# Patient Record
Sex: MALE | Race: BLACK OR AFRICAN AMERICAN | Employment: UNEMPLOYED | URBAN - METROPOLITAN AREA
[De-identification: names, ages, dates, MRNs, and addresses within clinical notes are randomized per-mention and may not be internally consistent; named-entity substitution may affect disease eponyms.]

---

## 2017-04-19 LAB — LDL-C, EXTERNAL: 56

## 2017-04-20 ENCOUNTER — OFFICE VISIT (OUTPATIENT)
Dept: CARDIOLOGY CLINIC | Age: 77
End: 2017-04-20

## 2017-04-20 VITALS
SYSTOLIC BLOOD PRESSURE: 127 MMHG | WEIGHT: 233 LBS | DIASTOLIC BLOOD PRESSURE: 66 MMHG | HEIGHT: 71 IN | HEART RATE: 75 BPM | BODY MASS INDEX: 32.62 KG/M2

## 2017-04-20 DIAGNOSIS — I34.0 NON-RHEUMATIC MITRAL REGURGITATION: ICD-10-CM

## 2017-04-20 DIAGNOSIS — I10 ESSENTIAL HYPERTENSION, BENIGN: ICD-10-CM

## 2017-04-20 DIAGNOSIS — R55 SYNCOPE AND COLLAPSE: ICD-10-CM

## 2017-04-20 DIAGNOSIS — I50.32 CHRONIC DIASTOLIC HEART FAILURE (HCC): Primary | ICD-10-CM

## 2017-04-20 DIAGNOSIS — E78.00 PURE HYPERCHOLESTEROLEMIA: ICD-10-CM

## 2017-04-20 DIAGNOSIS — E66.9 OBESITY (BMI 30.0-34.9): ICD-10-CM

## 2017-04-20 NOTE — LETTER
Gómez Jha 1940 
 
4/20/2017 Dear MD Han Herrera MD 
 
I had the pleasure of evaluating  Mr. Kay Palafox in office today. Below are the relevant portions of my assessment and plan of care. ICD-10-CM ICD-9-CM 1. Chronic diastolic heart failure ([facility] [address] I50.32 428.32   
 4/17; 10/16 NYHA2; use Teds rather than more diuresis due to h/o syncope 2. Essential hypertension, benign I10 401.1 AMB POC EKG ROUTINE W/ 12 LEADS, INTER & REP Controlled; stable 3. Non-rheumatic mitral regurgitation I34.0 424.0   
 1/14 mild MR  
4. Pure hypercholesterolemia E78.00 272.0   
 5/15 LDL97 
get from PCP 5. Syncope and collapse R55 780.2   
 1/14 likely vagal, post 1st dose hytrin 5/15; 11/15; 10/16; 4/17 no recurrence 6. Obesity (BMI 30.0-34. 9) E66.9 278.00 Weight loss has been strongly encouraged by following dietary restrictions and an exercise routine. Current Outpatient Prescriptions Medication Sig Dispense Refill  finasteride (PROSCAR) 5 mg tablet take 1 tablet by mouth once daily 90 Tab 3  
 RAPAFLO 8 mg capsule take 1 capsule by mouth once daily with BREAKFAST 30 Cap 12  
 metoprolol tartrate (LOPRESSOR) 50 mg tablet take 1 tablet by mouth twice a day 60 Tab 6  
 atorvastatin (LIPITOR) 10 mg tablet Take  by mouth daily.  acetaminophen (TYLENOL) 500 mg tablet Take  by mouth daily.  valsartan-hydrochlorothiazide (DIOVAN-HCT) 320-25 mg per tablet   1  
 aspirin delayed-release 81 mg tablet Take  by mouth daily.  metFORMIN (GLUCOPHAGE) 500 mg tablet Take  by mouth daily (with breakfast).  ergocalciferol (VITAMIN D2) 50,000 unit capsule Take 50,000 Units by mouth daily.  amLODIPine (NORVASC) 5 mg tablet take 1 tablet by mouth once daily 30 Tab 3  
 miscellaneous medical supply (T.E.D. ANTI-EMBOLISM STOCKING) misc 1 Each by Does Not Apply route daily.  2 Each 0  
  montelukast (SINGULAIR) 10 mg tablet Take 10 mg by mouth daily. Orders Placed This Encounter  AMB POC EKG ROUTINE W/ 12 LEADS, INTER & REP Order Specific Question:   Reason for Exam: Answer:   hypertension If you have questions, please do not hesitate to call me. I look forward to following Mr. Minerva Pimentel along with you. Sincerely, Radhika Herman MD

## 2017-04-20 NOTE — PROGRESS NOTES
1. Have you been to the ER, urgent care clinic since your last visit? Hospitalized since your last visit? No    2. Have you seen or consulted any other health care providers outside of the [address] [place] [address] since your last visit? Include any pap smears or colon screening. Yes Where: Dr Hoda Cano     3. Since your last visit, have you had any of the following symptoms? .           4. Have you had any blood work, X-rays or cardiac testing? Yes Where: PCP Office             5.  Where do you normally have your labs drawn? PCP Office    6. Do you need any refills today?    No

## 2017-04-20 NOTE — PATIENT INSTRUCTIONS
There are no discontinued medications. Orders Placed This Encounter    AMB POC EKG ROUTINE W/ 12 LEADS, INTER & REP     Order Specific Question:   Reason for Exam:     Answer:   hypertension          Mediterranean Diet: Care Instructions  Your Care Instructions  The Mediterranean diet features foods eaten in [place], [place], [place] and [place], and other countries that border the [facility]. It emphasizes eating a diet rich in fruits, vegetables, nuts, and high-fiber grains, and limits meat, cheese, and sweets. The Mediterranean diet may:  · Prevent heart disease and lower the risk of a heart attack or stroke. · Prevent type 2 diabetes. · Prevent Alzheimer's disease and other dementia. · Prevent depression. · Prevent Parkinson's disease. This diet contains more fat than other heart-healthy diets. But the fats are mainly from nuts, unsaturated oils, such as fish oils, olive oil, and certain nut or seed oils (such as canola, soybean, or flaxseed oil). These types of oils may help protect the heart and blood vessels. Follow-up care is a key part of your treatment and safety. Be sure to make and go to all appointments, and call your doctor if you are having problems. It's also a good idea to know your test results and keep a list of the medicines you take. How can you care for yourself at home? What to eat  · Eat a variety of fruits and vegetables each day, such as grapes, blueberries, tomatoes, broccoli, peppers, figs, olives, spinach, eggplant, beans, lentils, and chickpeas. · Eat a variety of whole-grain foods each day, such as oats, brown rice, and whole wheat bread, pasta, and couscous. · Eat fish at least 2 times a week. Try tuna, salmon, mackerel, lake trout, herring, or sardines. · Eat moderate amounts of low-fat dairy products, such as milk, cheese, or yogurt. · Eat moderate amounts of poultry and eggs.   · Choose healthy (unsaturated) fats, such as nuts, olive oil and certain nut or seed oils like canola, soybean, and flaxseed. · Limit unhealthy (saturated) fats, such as butter, palm oil, and coconut oil. And limit fats found in animal products, such as meat and dairy products made with whole milk. Try to eat red meat only a few times a month in very small amounts. · Limit sweets and desserts to only a few times a week. This includes sugar-sweetened drinks like soda. The Mediterranean diet may also include red wine with your meal1 glass each day for women and up to 2 glasses a day for men. Tips for changing your diet  · Dip bread in a mix of olive oil and fresh herbs instead of using butter. · Add avocado slices to your sandwich instead of carranza. · Have fish for lunch or dinner instead of red meat. Brush the fish with olive oil, and broil or grill it. · Sprinkle your salad with seeds or nuts instead of cheese. · Cook with olive or canola oil instead of butter or oils that are high in saturated fat. · Switch from 2% milk or whole milk to 1% or fat-free milk. · Dip raw vegetables in a vinaigrette dressing or hummus instead of dips made from mayonnaise or sour cream.  · Have a piece of fruit for dessert instead of a piece of cake. Try baked apples, or have some dried fruit. Part of the Mediterranean diet is being active. Get at least 30 minutes of exercise on most days of the week. Walking is a good choice. You also may want to do other activities, such as running, swimming, cycling, or playing tennis or team sports. Where can you learn more? Go to http://wade-indiana.info/. Enter O407 in the search box to learn more about \"Mediterranean Diet: Care Instructions. \"  Current as of: October 21, 2016  Content Version: 11.2  © 2216-0551 [employer]. Care instructions adapted under license by [employer] (which disclaims liability or warranty for this information).  If you have questions about a medical condition or this instruction, always ask your healthcare professional. [place] [address] any warranty or liability for your use of this information.

## 2017-04-20 NOTE — PROGRESS NOTES
HISTORY OF PRESENT ILLNESS  Abbi Mcmillan is a 67 y.o. male. HPI Comments: Syncope 3-4 hrs post 1st dose of hytrin in 1/14, gradual fall with mild sweating, urinary incontinence, no vomiting  Similar episode in 2005 approx  No recurrence since not taking hytrin in 1/14 5/16 restarted diovan and d/og lisinopril    Hypertension   The history is provided by the patient and medical records. Associated symptoms include shortness of breath. Pertinent negatives include no chest pain and no headaches. Cholesterol Problem   The history is provided by the medical records. This is a chronic problem. Associated symptoms include shortness of breath. Pertinent negatives include no chest pain and no headaches. CHF   The history is provided by the medical records. This is a chronic problem. Associated symptoms include shortness of breath. Pertinent negatives include no chest pain and no headaches. Valvular Heart Disease   The history is provided by the medical records (mr). Associated symptoms include shortness of breath. Pertinent negatives include no chest pain and no headaches. Shortness of Breath   The history is provided by the patient. This is a new problem. The problem occurs intermittently. The current episode started more than 1 week ago (few yrs, mainly fatigue). The problem has not changed since onset. Associated symptoms include leg swelling. Pertinent negatives include no fever, no headaches, no cough, no wheezing, no PND, no orthopnea, no chest pain, no vomiting, no rash and no claudication. The problem's precipitants include exercise (weed eating, rushing to walk; 11/15 cutting grass). Leg Swelling   The history is provided by the patient. This is a chronic problem. The current episode started more than 1 week ago. The problem occurs daily. The problem has not changed since onset. Associated symptoms include shortness of breath. Pertinent negatives include no chest pain and no headaches.  The symptoms are aggravated by standing. The symptoms are relieved by sleep. Review of Systems   Constitutional: Negative for chills, fever, malaise/fatigue and weight loss. HENT: Negative for nosebleeds. Eyes: Negative for discharge. Respiratory: Positive for shortness of breath. Negative for cough and wheezing. Cardiovascular: Positive for leg swelling. Negative for chest pain, palpitations, orthopnea, claudication and PND. Gastrointestinal: Negative for diarrhea, nausea and vomiting. Genitourinary: Negative for dysuria and hematuria. Musculoskeletal: Negative for joint pain. Skin: Negative for rash. Neurological: Positive for dizziness. Negative for seizures, loss of consciousness and headaches. Endo/Heme/Allergies: Negative for polydipsia. Does not bruise/bleed easily. Psychiatric/Behavioral: Negative for depression and substance abuse. The patient does not have insomnia.       No Known Allergies    Past Medical History:   Diagnosis Date    Allergic sinusitis     BPH (benign prostatic hyperplasia)     Chronic diastolic heart failure (HCC) 2/11/2014    Congestive heart failure, unspecified     DJD (degenerative joint disease)     Erectile dysfunction     Essential hypertension     Hydrocele     Hyperlipidemia 5/2/2016    Mitral valve disorders 2/11/2014 1/14 mild MR     Obesity, unspecified 11/10/2014    Syncope and collapse 1/14/2014 1/14 likely vagal, post 1st dose hytrin 5/15 no recurrence        Family History   Problem Relation Age of Onset    Heart Attack Father 61    Heart Attack Brother 58    Prostate Cancer Other     Stroke Other     Hypertension Other        Social History   Substance Use Topics    Smoking status: Former Smoker     Quit date: 1/14/1974    Smokeless tobacco: Never Used    Alcohol use No        Current Outpatient Prescriptions   Medication Sig    finasteride (PROSCAR) 5 mg tablet take 1 tablet by mouth once daily    RAPAFLO 8 mg capsule take 1 capsule by mouth once daily with BREAKFAST    metoprolol tartrate (LOPRESSOR) 50 mg tablet take 1 tablet by mouth twice a day    atorvastatin (LIPITOR) 10 mg tablet Take  by mouth daily.  acetaminophen (TYLENOL) 500 mg tablet Take  by mouth daily.  valsartan-hydrochlorothiazide (DIOVAN-HCT) 320-25 mg per tablet     aspirin delayed-release 81 mg tablet Take  by mouth daily.  metFORMIN (GLUCOPHAGE) 500 mg tablet Take  by mouth daily (with breakfast).  ergocalciferol (VITAMIN D2) 50,000 unit capsule Take 50,000 Units by mouth daily.  amLODIPine (NORVASC) 5 mg tablet take 1 tablet by mouth once daily    miscellaneous medical supply (T.E.D. ANTI-EMBOLISM STOCKING) misc 1 Each by Does Not Apply route daily.  montelukast (SINGULAIR) 10 mg tablet Take 10 mg by mouth daily. No current facility-administered medications for this visit. Past Surgical History:   Procedure Laterality Date    HX CIRCUMCISION      HX HERNIA REPAIR Bilateral        Diagnostic Studies:  CARDIOLOGY STUDIES 1/16/2014   Exercise Nuclear Stress Test Result red capacity, nl scan, nl EF   Echocardiogram - Complete Result 55%EF, mild DD, mild MR/dil LA       Visit Vitals    /66    Pulse 75    Ht 5' 11\" (1.803 m)    Wt 105.7 kg (233 lb)    BMI 32.5 kg/m2       Mr. Reynaldo Sutton has a reminder for a \"due or due soon\" health maintenance. I have asked that he contact his primary care provider for follow-up on this health maintenance. Physical Exam   Constitutional: He is oriented to person, place, and time. He appears well-developed and well-nourished. No distress. HENT:   Head: Normocephalic and atraumatic. Mouth/Throat: Normal dentition. Eyes: Right eye exhibits no discharge. Left eye exhibits no discharge. No scleral icterus. Neck: Neck supple. No JVD present. Carotid bruit is not present. No thyromegaly present.    Cardiovascular: Normal rate, regular rhythm, S1 normal, S2 normal, normal heart sounds and intact distal pulses. Exam reveals no gallop and no friction rub. No murmur heard. Pulmonary/Chest: Effort normal and breath sounds normal. He has no wheezes. He has no rales. Abdominal: Soft. He exhibits no mass. There is no tenderness. Musculoskeletal: He exhibits edema (trace). Lymphadenopathy:        Right cervical: No superficial cervical adenopathy present. Left cervical: No superficial cervical adenopathy present. Neurological: He is alert and oriented to person, place, and time. Skin: Skin is warm and dry. No rash noted. Psychiatric: He has a normal mood and affect. His behavior is normal.       ASSESSMENT and PLAN    Discussed the patient's above normal BMI with him. I have recommended the following interventions: dietary management education, guidance, and counseling . The BMI follow up plan is as follows: BMI is out of normal parameters and plan is as follows: I have counseled this patient on diet and exercise regimens          Heaven Rothman was seen today for hypertension, cholesterol problem and chf.    Diagnoses and all orders for this visit:    Chronic diastolic heart failure ([address]  Comments:  4/17; 10/16 [id number]; use Teds rather than more diuresis due to h/o syncope      Essential hypertension, benign  Comments:  Controlled; stable      Orders:  -     AMB POC EKG ROUTINE W/ 12 LEADS, INTER & REP    Non-rheumatic mitral regurgitation  Comments:  1/14 mild MR    Pure hypercholesterolemia  Comments:  5/15 LDL97  get from PCP    Syncope and collapse  Comments:  1/14 likely vagal, post 1st dose hytrin  5/15; 11/15; 10/16; 4/17 no recurrence      Obesity (BMI 30.0-34. 9)  Comments:  Weight loss has been strongly encouraged by following dietary restrictions and an exercise routine. Pertinent laboratory and test data reviewed and discussed with patient. See patient instructions also for other medical advice given    There are no discontinued medications.     Follow-up Disposition:  Return in about 6 months (around 10/20/2017), or if symptoms worsen or fail to improve, for with ekg.

## 2017-04-20 NOTE — MR AVS SNAPSHOT
Visit Information Date & Time Provider Department Dept. Phone Encounter #  
 4/20/2017  8:15 AM Kaye Juan MD [facility] [id number] 2016 Follow-up Instructions Return in about 6 months (around 10/20/2017), or if symptoms worsen or fail to improve. Your Appointments 4/27/2017  9:30 AM  
ESTABLISHED PATIENT with Concepción Khan MD  
[facility]) Appt Note: 6mo F/u Flow [place] UA; .  
 [address] Upcoming Health Maintenance Date Due DTaP/Tdap/Td series (1 - Tdap) 8/15/1960 ZOSTER VACCINE AGE 60> 8/15/1999 GLAUCOMA SCREENING Q2Y 8/15/2004 Pneumococcal 65+ Low/Medium Risk (1 of 2 - PCV13) 8/15/2004 MEDICARE YEARLY EXAM 8/15/2004 INFLUENZA AGE 9 TO ADULT 8/1/2016 Allergies as of 4/20/2017  Review Complete On: 4/20/2017 By: Kaye Juan MD  
 No Known Allergies Current Immunizations  Never Reviewed No immunizations on file. Not reviewed this visit You Were Diagnosed With   
  
 Codes Comments Chronic diastolic heart failure ([facility] [address]    -  Primary ICD-10-CM: I50.32 
ICD-9-CM: 428.32 4/17; 10/16 QSDC7; use Teds rather than more diuresis due to h/o syncope Essential hypertension, benign     ICD-10-CM: I10 
ICD-9-CM: 401.1 Controlled; stable Non-rheumatic mitral regurgitation     ICD-10-CM: I34.0 ICD-9-CM: 424.0 1/14 mild MR Pure hypercholesterolemia     ICD-10-CM: E78.00 ICD-9-CM: 272.0 5/15 LDL97 
get from PCP Syncope and collapse     ICD-10-CM: R55 
ICD-9-CM: 780.2 1/14 likely vagal, post 1st dose hytrin 5/15; 11/15; 10/16; 4/17 no recurrence Obesity (BMI 30.0-34.9)     ICD-10-CM: Z35.2 ICD-9-CM: 278.00 Weight loss has been strongly encouraged by following dietary restrictions and an exercise routine. Vitals BP Pulse Height(growth percentile) Weight(growth percentile) BMI Smoking Status 127/66 75 5' 11\" (1.803 m) 233 lb (105.7 kg) 32.5 kg/m2 Former Smoker Vitals History BMI and BSA Data Body Mass Index Body Surface Area 32.5 kg/m 2 2.3 m 2 Preferred Pharmacy Pharmacy Name Phone [facility], [address] Your Updated Medication List  
  
   
This list is accurate as of: 4/20/17  9:09 AM.  Always use your most recent med list.  
  
  
  
  
 acetaminophen 500 mg tablet Commonly known as:  TYLENOL Take  by mouth daily. amLODIPine 5 mg tablet Commonly known as:  NORVASC  
take 1 tablet by mouth once daily  
  
 aspirin delayed-release 81 mg tablet Take  by mouth daily. atorvastatin 10 mg tablet Commonly known as:  LIPITOR Take  by mouth daily. finasteride 5 mg tablet Commonly known as:  PROSCAR  
take 1 tablet by mouth once daily  
  
 metFORMIN 500 mg tablet Commonly known as:  GLUCOPHAGE Take  by mouth daily (with breakfast). metoprolol tartrate 50 mg tablet Commonly known as:  LOPRESSOR  
take 1 tablet by mouth twice a day  
  
 miscellaneous medical supply Misc Commonly known as:  T.E.D. ANTI-EMBOLISM STOCKING  
1 Each by Does Not Apply route daily. montelukast 10 mg tablet Commonly known as:  SINGULAIR Take 10 mg by mouth daily. RAPAFLO 8 mg capsule Generic drug:  silodosin  
take 1 capsule by mouth once daily with BREAKFAST  
  
 valsartan-hydroCHLOROthiazide 320-25 mg per tablet Commonly known as:  DIOVAN-HCT  
  
 VITAMIN D2 50,000 unit capsule Generic drug:  ergocalciferol Take 50,000 Units by mouth daily. We Performed the Following AMB POC EKG ROUTINE W/ 12 LEADS, INTER & REP [71624 CPT(R)] Follow-up Instructions Return in about 6 months (around 10/20/2017), or if symptoms worsen or fail to improve. Patient Instructions There are no discontinued medications. Orders Placed This Encounter  AMB POC EKG ROUTINE W/ 12 LEADS, INTER & REP Order Specific Question:   Reason for Exam: Answer:   hypertension Mediterranean Diet: Care Instructions Your Care Instructions The Mediterranean diet features foods eaten in [place], [place], [place] and [place], and other countries that border the [facility]. It emphasizes eating a diet rich in fruits, vegetables, nuts, and high-fiber grains, and limits meat, cheese, and sweets. The Mediterranean diet may: · Prevent heart disease and lower the risk of a heart attack or stroke. · Prevent type 2 diabetes. · Prevent Alzheimer's disease and other dementia. · Prevent depression. · Prevent Parkinson's disease. This diet contains more fat than other heart-healthy diets. But the fats are mainly from nuts, unsaturated oils, such as fish oils, olive oil, and certain nut or seed oils (such as canola, soybean, or flaxseed oil). These types of oils may help protect the heart and blood vessels. Follow-up care is a key part of your treatment and safety. Be sure to make and go to all appointments, and call your doctor if you are having problems. It's also a good idea to know your test results and keep a list of the medicines you take. How can you care for yourself at home? What to eat · Eat a variety of fruits and vegetables each day, such as grapes, blueberries, tomatoes, broccoli, peppers, figs, olives, spinach, eggplant, beans, lentils, and chickpeas. · Eat a variety of whole-grain foods each day, such as oats, brown rice, and whole wheat bread, pasta, and couscous. · Eat fish at least 2 times a week. Try tuna, salmon, mackerel, lake trout, herring, or sardines. · Eat moderate amounts of low-fat dairy products, such as milk, cheese, or yogurt. · Eat moderate amounts of poultry and eggs. · Choose healthy (unsaturated) fats, such as nuts, olive oil and certain nut or seed oils like canola, soybean, and flaxseed. · Limit unhealthy (saturated) fats, such as butter, palm oil, and coconut oil. And limit fats found in animal products, such as meat and dairy products made with whole milk. Try to eat red meat only a few times a month in very small amounts. · Limit sweets and desserts to only a few times a week. This includes sugar-sweetened drinks like soda. The Mediterranean diet may also include red wine with your meal1 glass each day for women and up to 2 glasses a day for men. Tips for changing your diet · Dip bread in a mix of olive oil and fresh herbs instead of using butter. · Add avocado slices to your sandwich instead of carranza. · Have fish for lunch or dinner instead of red meat. Brush the fish with olive oil, and broil or grill it. · Sprinkle your salad with seeds or nuts instead of cheese. · Cook with olive or canola oil instead of butter or oils that are high in saturated fat. · Switch from 2% milk or whole milk to 1% or fat-free milk. · Dip raw vegetables in a vinaigrette dressing or hummus instead of dips made from mayonnaise or sour cream. 
· Have a piece of fruit for dessert instead of a piece of cake. Try baked apples, or have some dried fruit. Part of the Mediterranean diet is being active. Get at least 30 minutes of exercise on most days of the week. Walking is a good choice. You also may want to do other activities, such as running, swimming, cycling, or playing tennis or team sports. Where can you learn more? Go to http://wade-indiana.info/. Enter O407 in the search box to learn more about \"Mediterranean Diet: Care Instructions. \" Current as of: October 21, 2016 Content Version: 11.2 © 7203-1260 [employer].  Care instructions adapted under license by wali (which disclaims liability or warranty for this information). If you have questions about a medical condition or this instruction, always ask your healthcare professional. [address] any warranty or liability for your use of this information. Introducing [facility]! Jeramie Franklin introduces Intucell patient portal. Now you can access parts of your medical record, email your doctor's office, and request medication refills online. 1. In your internet browser, go to https://[employer]. OX [employer]IA/[employer] 2. Click on the First Time User? Click Here link in the Sign In box. You will see the New Member Sign Up page. 3. Enter your Intucell Access Code exactly as it appears below. You will not need to use this code after youve completed the sign-up process. If you do not sign up before the expiration date, you must request a new code. · Intucell Access Code: [email or web address] Expires: 7/19/2017  8:25 AM 
 
4. Enter the last four digits of your Social Security Number (xxxx) and Date of Birth (mm/dd/yyyy) as indicated and click Submit. You will be taken to the next sign-up page. 5. Create a Intucell ID. This will be your Intucell login ID and cannot be changed, so think of one that is secure and easy to remember. 6. Create a Intucell password. You can change your password at any time. 7. Enter your Password Reset Question and Answer. This can be used at a later time if you forget your password. 8. Enter your e-mail address. You will receive e-mail notification when new information is available in [address] 9. Click Sign Up. You can now view and download portions of your medical record. 10. Click the Download Summary menu link to download a portable copy of your medical information. If you have questions, please visit the Frequently Asked Questions section of the Intucell website. Remember, Intucell is NOT to be used for urgent needs. For medical emergencies, dial 911. Now available from your iPhone and Android! Please provide this summary of care documentation to your next provider. Your primary care clinician is listed as JANINE HARO. If you have any questions after today's visit, please call [phone].

## 2017-10-02 ENCOUNTER — OFFICE VISIT (OUTPATIENT)
Dept: CARDIOLOGY CLINIC | Age: 77
End: 2017-10-02

## 2017-10-02 VITALS
DIASTOLIC BLOOD PRESSURE: 74 MMHG | SYSTOLIC BLOOD PRESSURE: 129 MMHG | WEIGHT: 232 LBS | HEART RATE: 56 BPM | HEIGHT: 71 IN | BODY MASS INDEX: 32.48 KG/M2

## 2017-10-02 DIAGNOSIS — I34.0 NON-RHEUMATIC MITRAL REGURGITATION: ICD-10-CM

## 2017-10-02 DIAGNOSIS — I50.32 CHRONIC DIASTOLIC HEART FAILURE (HCC): Primary | ICD-10-CM

## 2017-10-02 DIAGNOSIS — E78.00 PURE HYPERCHOLESTEROLEMIA: ICD-10-CM

## 2017-10-02 DIAGNOSIS — I10 ESSENTIAL HYPERTENSION, BENIGN: ICD-10-CM

## 2017-10-02 DIAGNOSIS — E66.9 OBESITY (BMI 30.0-34.9): ICD-10-CM

## 2017-10-02 NOTE — LETTER
Hernán Solders 1940 
 
10/2/2017 Dear MD Vega Mello MD 
 
I had the pleasure of evaluating  Mr. Jey Flores in office today. Below are the relevant portions of my assessment and plan of care. ICD-10-CM ICD-9-CM 1. Chronic diastolic heart failure ([address] I50.32 428.32   
 9/17; 4/17; 10/16 NYHA2; use Teds rather than more diuresis due to h/o syncope 2. Non-rheumatic mitral regurgitation I34.0 424.0   
 1/14 mild MR 
  
3. Pure hypercholesterolemia E78.00 272.0   
 5/15 LDL97 
get labs from PCP 4. Essential hypertension, benign I10 401.1 Controlled; stable 5. Obesity (BMI 30.0-34. 9) E66.9 278.00 Weight loss has been strongly encouraged by following dietary restrictions and an exercise routine. Current Outpatient Prescriptions Medication Sig Dispense Refill  finasteride (PROSCAR) 5 mg tablet take 1 tablet by mouth once daily 90 Tab 3  
 silodosin (RAPAFLO) 8 mg capsule take 1 capsule by mouth once daily with BREAKFAST 90 Cap 3  
 metoprolol tartrate (LOPRESSOR) 50 mg tablet take 1 tablet by mouth twice a day 60 Tab 6  
 atorvastatin (LIPITOR) 10 mg tablet Take  by mouth daily.  acetaminophen (TYLENOL) 500 mg tablet Take  by mouth daily.  valsartan-hydrochlorothiazide (DIOVAN-HCT) 320-25 mg per tablet   1  
 aspirin delayed-release 81 mg tablet Take  by mouth daily.  metFORMIN (GLUCOPHAGE) 500 mg tablet Take  by mouth daily (with breakfast).  ergocalciferol (VITAMIN D2) 50,000 unit capsule Take 50,000 Units by mouth daily.  amLODIPine (NORVASC) 5 mg tablet take 1 tablet by mouth once daily 30 Tab 3  
 miscellaneous medical supply (T.E.D. ANTI-EMBOLISM STOCKING) misc 1 Each by Does Not Apply route daily. 2 Each 0  
 montelukast (SINGULAIR) 10 mg tablet Take 10 mg by mouth daily. No orders of the defined types were placed in this encounter. If you have questions, please do not hesitate to call me. I look forward to following Mr. Peterson Zabala along with you. Sincerely, Alfreda Barlow MD

## 2017-10-02 NOTE — MR AVS SNAPSHOT
Visit Information Date & Time Provider Department Dept. Phone Encounter #  
 10/2/2017  9:30 AM Jesica Way MD [facility]  Follow-up Instructions Return in about 6 months (around 4/2/2018), or if symptoms worsen or fail to improve. Your Appointments 10/31/2017  8:45 AM  
ESTABLISHED PATIENT with Gael Mac MD  
[facility]) Appt Note: 6mo F/u Flow [place] ua  
 [address] [facility] [address] Upcoming Health Maintenance Date Due DTaP/Tdap/Td series (1 - Tdap) 8/15/1960 ZOSTER VACCINE AGE 60> 6/15/1999 GLAUCOMA SCREENING Q2Y 8/15/2004 Pneumococcal 65+ Low/Medium Risk (1 of 2 - PCV13) 8/15/2004 MEDICARE YEARLY EXAM 8/15/2004 INFLUENZA AGE 9 TO ADULT 8/1/2017 Allergies as of 10/2/2017  Review Complete On: 10/2/2017 By: Jordan Rodriguez LPN No Known Allergies Current Immunizations  Never Reviewed No immunizations on file. Not reviewed this visit You Were Diagnosed With   
  
 Codes Comments Chronic diastolic heart failure ([facility] [address]    -  Primary ICD-10-CM: I50.32 
ICD-9-CM: 428.32 9/17; 4/17; 10/16 RSLR5; use Teds rather than more diuresis due to h/o syncope Non-rheumatic mitral regurgitation     ICD-10-CM: I34.0 ICD-9-CM: 424.0 1/14 mild MR Pure hypercholesterolemia     ICD-10-CM: E78.00 ICD-9-CM: 272.0 5/15 LDL97 
get labs from PCP Essential hypertension, benign     ICD-10-CM: I10 
ICD-9-CM: 401.1 Controlled; stable Obesity (BMI 30.0-34.9)     ICD-10-CM: V87.3 ICD-9-CM: 278.00 Weight loss has been strongly encouraged by following dietary restrictions and an exercise routine. Vitals BP Pulse Height(growth percentile) Weight(growth percentile) BMI Smoking Status 129/74 (!) 56 5' 11\" (1.803 m) 232 lb (105.2 kg) 32.36 kg/m2 Former Smoker Vitals History BMI and BSA Data Body Mass Index Body Surface Area  
 32.36 kg/m 2 2.3 m 2 Preferred Pharmacy Pharmacy Name Phone [address] [facility], [address] Your Updated Medication List  
  
   
This list is accurate as of: 10/2/17 10:17 AM.  Always use your most recent med list.  
  
  
  
  
 acetaminophen 500 mg tablet Commonly known as:  TYLENOL Take  by mouth daily. amLODIPine 5 mg tablet Commonly known as:  NORVASC  
take 1 tablet by mouth once daily  
  
 aspirin delayed-release 81 mg tablet Take  by mouth daily. atorvastatin 10 mg tablet Commonly known as:  LIPITOR Take  by mouth daily. finasteride 5 mg tablet Commonly known as:  PROSCAR  
take 1 tablet by mouth once daily  
  
 metFORMIN 500 mg tablet Commonly known as:  GLUCOPHAGE Take  by mouth daily (with breakfast). metoprolol tartrate 50 mg tablet Commonly known as:  LOPRESSOR  
take 1 tablet by mouth twice a day  
  
 miscellaneous medical supply Misc Commonly known as:  T.E.D. ANTI-EMBOLISM STOCKING  
1 Each by Does Not Apply route daily. montelukast 10 mg tablet Commonly known as:  SINGULAIR Take 10 mg by mouth daily. silodosin 8 mg capsule Commonly known as:  RAPAFLO  
take 1 capsule by mouth once daily with BREAKFAST  
  
 valsartan-hydroCHLOROthiazide 320-25 mg per tablet Commonly known as:  DIOVAN-HCT  
  
 VITAMIN D2 50,000 unit capsule Generic drug:  ergocalciferol Take 50,000 Units by mouth daily. Follow-up Instructions Return in about 6 months (around 4/2/2018), or if symptoms worsen or fail to improve. Patient Instructions There are no discontinued medications. No orders of the defined types were placed in this encounter. Body Mass Index: Care Instructions Your Care Instructions Body mass index (BMI) can help you see if your weight is raising your risk for health problems. It uses a formula to compare how much you weigh with how tall you are. · A BMI lower than 18.5 is considered underweight. · A BMI between 18.5 and 24.9 is considered healthy. · A BMI between 25 and 29.9 is considered overweight. A BMI of 30 or higher is considered obese. If your BMI is in the normal range, it means that you have a lower risk for weight-related health problems. If your BMI is in the overweight or obese range, you may be at increased risk for weight-related health problems, such as high blood pressure, heart disease, stroke, arthritis or joint pain, and diabetes. If your BMI is in the underweight range, you may be at increased risk for health problems such as fatigue, lower protection (immunity) against illness, muscle loss, bone loss, hair loss, and hormone problems. BMI is just one measure of your risk for weight-related health problems. You may be at higher risk for health problems if you are not active, you eat an unhealthy diet, or you drink too much alcohol or use tobacco products. Follow-up care is a key part of your treatment and safety. Be sure to make and go to all appointments, and call your doctor if you are having problems. It's also a good idea to know your test results and keep a list of the medicines you take. How can you care for yourself at home? · Practice healthy eating habits. This includes eating plenty of fruits, vegetables, whole grains, lean protein, and low-fat dairy. · If your doctor recommends it, get more exercise. Walking is a good choice. Bit by bit, increase the amount you walk every day. Try for at least 30 minutes on most days of the week. · Do not smoke. Smoking can increase your risk for health problems. If you need help quitting, talk to your doctor about stop-smoking programs and medicines. These can increase your chances of quitting for good. · Limit alcohol to 2 drinks a day for men and 1 drink a day for women. Too much alcohol can cause health problems. If you have a BMI higher than 25 · Your doctor may do other tests to check your risk for weight-related health problems. This may include measuring the distance around your waist. A waist measurement of more than 40 inches in men or 35 inches in women can increase the risk of weight-related health problems. · Talk with your doctor about steps you can take to stay healthy or improve your health. You may need to make lifestyle changes to lose weight and stay healthy, such as changing your diet and getting regular exercise. If you have a BMI lower than 18.5 · Your doctor may do other tests to check your risk for health problems. · Talk with your doctor about steps you can take to stay healthy or improve your health. You may need to make lifestyle changes to gain or maintain weight and stay healthy, such as getting more healthy foods in your diet and doing exercises to build muscle. Where can you learn more? Go to http://wade-indiana.info/. Enter S176 in the search box to learn more about \"Body Mass Index: Care Instructions. \" Current as of: January 23, 2017 Content Version: 11.3 © 7599-3094 [employer]. Care instructions adapted under license by Carefx (which disclaims liability or warranty for this information). If you have questions about a medical condition or this instruction, always ask your healthcare professional. [place] [address] any warranty or liability for your use of this information. Introducing [facility]! [employer] introduces [employer] patient portal. Now you can access parts of your medical record, email your doctor's office, and request medication refills online. 1. In your internet browser, go to [email or web address] 2. Click on the First Time User? Click Here link in the Sign In box. You will see the New Member Sign Up page. 3. Enter your Litepoint Access Code exactly as it appears below. You will not need to use this code after youve completed the sign-up process. If you do not sign up before the expiration date, you must request a new code. · Litepoint Access Code: [email or web address] Expires: 12/31/2017  9:22 AM 
 
4. Enter the last four digits of your Social Security Number (xxxx) and Date of Birth (mm/dd/yyyy) as indicated and click Submit. You will be taken to the next sign-up page. 5. Create a Litepoint ID. This will be your Litepoint login ID and cannot be changed, so think of one that is secure and easy to remember. 6. Create a Litepoint password. You can change your password at any time. 7. Enter your Password Reset Question and Answer. This can be used at a later time if you forget your password. 8. Enter your e-mail address. You will receive e-mail notification when new information is available in [address] 9. Click Sign Up. You can now view and download portions of your medical record. 10. Click the Download Summary menu link to download a portable copy of your medical information. If you have questions, please visit the Frequently Asked Questions section of the Litepoint website. Remember, Litepoint is NOT to be used for urgent needs. For medical emergencies, dial 911. Now available from your iPhone and Android! Please provide this summary of care documentation to your next provider. Your primary care clinician is listed as Ira Colon. If you have any questions after today's visit, please call [phone].

## 2017-10-02 NOTE — PATIENT INSTRUCTIONS
There are no discontinued medications. No orders of the defined types were placed in this encounter. Body Mass Index: Care Instructions  Your Care Instructions    Body mass index (BMI) can help you see if your weight is raising your risk for health problems. It uses a formula to compare how much you weigh with how tall you are. · A BMI lower than 18.5 is considered underweight. · A BMI between 18.5 and 24.9 is considered healthy. · A BMI between 25 and 29.9 is considered overweight. A BMI of 30 or higher is considered obese. If your BMI is in the normal range, it means that you have a lower risk for weight-related health problems. If your BMI is in the overweight or obese range, you may be at increased risk for weight-related health problems, such as high blood pressure, heart disease, stroke, arthritis or joint pain, and diabetes. If your BMI is in the underweight range, you may be at increased risk for health problems such as fatigue, lower protection (immunity) against illness, muscle loss, bone loss, hair loss, and hormone problems. BMI is just one measure of your risk for weight-related health problems. You may be at higher risk for health problems if you are not active, you eat an unhealthy diet, or you drink too much alcohol or use tobacco products. Follow-up care is a key part of your treatment and safety. Be sure to make and go to all appointments, and call your doctor if you are having problems. It's also a good idea to know your test results and keep a list of the medicines you take. How can you care for yourself at home? · Practice healthy eating habits. This includes eating plenty of fruits, vegetables, whole grains, lean protein, and low-fat dairy. · If your doctor recommends it, get more exercise. Walking is a good choice. Bit by bit, increase the amount you walk every day. Try for at least 30 minutes on most days of the week. · Do not smoke.  Smoking can increase your risk for health problems. If you need help quitting, talk to your doctor about stop-smoking programs and medicines. These can increase your chances of quitting for good. · Limit alcohol to 2 drinks a day for men and 1 drink a day for women. Too much alcohol can cause health problems. If you have a BMI higher than 25  · Your doctor may do other tests to check your risk for weight-related health problems. This may include measuring the distance around your waist. A waist measurement of more than 40 inches in men or 35 inches in women can increase the risk of weight-related health problems. · Talk with your doctor about steps you can take to stay healthy or improve your health. You may need to make lifestyle changes to lose weight and stay healthy, such as changing your diet and getting regular exercise. If you have a BMI lower than 18.5  · Your doctor may do other tests to check your risk for health problems. · Talk with your doctor about steps you can take to stay healthy or improve your health. You may need to make lifestyle changes to gain or maintain weight and stay healthy, such as getting more healthy foods in your diet and doing exercises to build muscle. Where can you learn more? Go to http://wade-indiana.info/. Enter S176 in the search box to learn more about \"Body Mass Index: Care Instructions. \"  Current as of: January 23, 2017  Content Version: 11.3  © 5088-4300 [employer]. Care instructions adapted under license by [employer] (which disclaims liability or warranty for this information). If you have questions about a medical condition or this instruction, always ask your healthcare professional. [place] [address] any warranty or liability for your use of this information. Requested labs from PCP.

## 2017-10-02 NOTE — PROGRESS NOTES
1. Have you been to the ER, urgent care clinic since your last visit? Hospitalized since your last visit? No      2. Have you seen or consulted any other health care providers outside of the [address] [place] [address] since your last visit? Include any pap smears or colon screening. Yes    3. Since your last visit, have you had any of the following symptoms? Swelling in ankles    4. Have you had any blood work, X-rays or cardiac testing? Yes, pcp             5.  Where do you normally have your labs drawn?   pcp    6. Do you need any refills today?    no

## 2017-10-02 NOTE — PROGRESS NOTES
HISTORY OF PRESENT ILLNESS  Angel Tate is a 65 y.o. male. HPI Comments: Syncope 3-4 hrs post 1st dose of hytrin in 1/14, gradual fall with mild sweating, urinary incontinence, no vomiting  Similar episode in 2005 approx  No recurrence since not taking hytrin in 1/14 5/16 restarted diovan and d/og lisinopril    CHF   The history is provided by the medical records. This is a chronic problem. Associated symptoms include shortness of breath. Pertinent negatives include no chest pain and no headaches. Hypertension   The history is provided by the patient and medical records. Associated symptoms include shortness of breath. Pertinent negatives include no chest pain and no headaches. Valvular Heart Disease   The history is provided by the medical records (mr). Associated symptoms include shortness of breath. Pertinent negatives include no chest pain and no headaches. Cholesterol Problem   The history is provided by the medical records. This is a chronic problem. Associated symptoms include shortness of breath. Pertinent negatives include no chest pain and no headaches. Shortness of Breath   The history is provided by the patient. This is a new problem. The problem occurs intermittently. The current episode started more than 1 week ago (few yrs, mainly fatigue). The problem has not changed since onset. Associated symptoms include leg swelling. Pertinent negatives include no fever, no headaches, no cough, no wheezing, no PND, no orthopnea, no chest pain, no vomiting, no rash and no claudication. The problem's precipitants include exercise (weed eating, rushing to walk; 11/15 cutting grass). Leg Swelling   The history is provided by the patient. This is a chronic problem. The current episode started more than 1 week ago. The problem occurs daily. The problem has not changed since onset. Associated symptoms include shortness of breath. Pertinent negatives include no chest pain and no headaches.  The symptoms are aggravated by standing. The symptoms are relieved by sleep. Review of Systems   Constitutional: Negative for chills, fever, malaise/fatigue and weight loss. HENT: Negative for nosebleeds. Eyes: Negative for discharge. Respiratory: Positive for shortness of breath. Negative for cough and wheezing. Cardiovascular: Positive for leg swelling. Negative for chest pain, palpitations, orthopnea, claudication and PND. Gastrointestinal: Negative for diarrhea, nausea and vomiting. Genitourinary: Negative for dysuria and hematuria. Musculoskeletal: Negative for joint pain. Skin: Negative for rash. Neurological: Positive for dizziness. Negative for seizures, loss of consciousness and headaches. Endo/Heme/Allergies: Negative for polydipsia. Does not bruise/bleed easily. Psychiatric/Behavioral: Negative for depression and substance abuse. The patient does not have insomnia.       No Known Allergies    Past Medical History:   Diagnosis Date    Allergic sinusitis     BPH (benign prostatic hyperplasia)     Chronic diastolic heart failure (HCC) 2/11/2014    Congestive heart failure, unspecified     DJD (degenerative joint disease)     Erectile dysfunction     Essential hypertension     Hydrocele     Hyperlipidemia 5/2/2016    Mitral valve disorders 2/11/2014 1/14 mild MR     Obesity, unspecified 11/10/2014    Syncope and collapse 1/14/2014 1/14 likely vagal, post 1st dose hytrin 5/15 no recurrence        Family History   Problem Relation Age of Onset    Heart Attack Father 61    Heart Attack Brother 58    Prostate Cancer Other     Stroke Other     Hypertension Other        Social History   Substance Use Topics    Smoking status: Former Smoker     Quit date: 1/14/1974    Smokeless tobacco: Never Used    Alcohol use No        Current Outpatient Prescriptions   Medication Sig    finasteride (PROSCAR) 5 mg tablet take 1 tablet by mouth once daily    silodosin (RAPAFLO) 8 mg capsule take 1 capsule by mouth once daily with BREAKFAST    metoprolol tartrate (LOPRESSOR) 50 mg tablet take 1 tablet by mouth twice a day    atorvastatin (LIPITOR) 10 mg tablet Take  by mouth daily.  acetaminophen (TYLENOL) 500 mg tablet Take  by mouth daily.  valsartan-hydrochlorothiazide (DIOVAN-HCT) 320-25 mg per tablet     aspirin delayed-release 81 mg tablet Take  by mouth daily.  metFORMIN (GLUCOPHAGE) 500 mg tablet Take  by mouth daily (with breakfast).  ergocalciferol (VITAMIN D2) 50,000 unit capsule Take 50,000 Units by mouth daily.  amLODIPine (NORVASC) 5 mg tablet take 1 tablet by mouth once daily    miscellaneous medical supply (T.E.D. ANTI-EMBOLISM STOCKING) misc 1 Each by Does Not Apply route daily.  montelukast (SINGULAIR) 10 mg tablet Take 10 mg by mouth daily. No current facility-administered medications for this visit. Past Surgical History:   Procedure Laterality Date    HX CIRCUMCISION      HX HERNIA REPAIR Bilateral        Diagnostic Studies:  CARDIOLOGY STUDIES 1/16/2014   Exercise Nuclear Stress Test Result red capacity, nl scan, nl EF   Echocardiogram - Complete Result 55%EF, mild DD, mild MR/dil LA   Some recent data might be hidden       Visit Vitals    /74    Pulse (!) 56    Ht 5' 11\" (1.803 m)    Wt 105.2 kg (232 lb)    BMI 32.36 kg/m2       Mr. Son Morgan has a reminder for a \"due or due soon\" health maintenance. I have asked that he contact his primary care provider for follow-up on this health maintenance. Physical Exam   Constitutional: He is oriented to person, place, and time. He appears well-developed and well-nourished. No distress. HENT:   Head: Normocephalic and atraumatic. Mouth/Throat: Normal dentition. Eyes: Right eye exhibits no discharge. Left eye exhibits no discharge. No scleral icterus. Neck: Neck supple. No JVD present. Carotid bruit is not present. No thyromegaly present.    Cardiovascular: Normal rate, regular rhythm, S1 normal, S2 normal, normal heart sounds and intact distal pulses. Exam reveals no gallop and no friction rub. No murmur heard. Pulmonary/Chest: Effort normal and breath sounds normal. He has no wheezes. He has no rales. Abdominal: Soft. He exhibits no mass. There is no tenderness. Musculoskeletal: He exhibits edema (trace). Lymphadenopathy:        Right cervical: No superficial cervical adenopathy present. Left cervical: No superficial cervical adenopathy present. Neurological: He is alert and oriented to person, place, and time. Skin: Skin is warm and dry. No rash noted. Psychiatric: He has a normal mood and affect. His behavior is normal.       ASSESSMENT and PLAN    Discussed the patient's above normal BMI with him. I have recommended the following interventions: dietary management education, guidance, and counseling . The BMI follow up plan is as follows: BMI is out of normal parameters and plan is as follows: I have counseled this patient on diet and exercise regimens        Diagnoses and all orders for this visit:    1. Chronic diastolic heart failure ([address]  Comments:  9/17; 4/17; 10/16 ZVRS2; use Teds rather than more diuresis due to h/o syncope      2. Non-rheumatic mitral regurgitation  Comments:  1/14 mild MR      3. Pure hypercholesterolemia  Comments:  5/15 LDL97  get labs from PCP    4. Essential hypertension, benign  Comments:  Controlled; stable        5. Obesity (BMI 30.0-34. 9)  Comments:  Weight loss has been strongly encouraged by following dietary restrictions and an exercise routine. Pertinent laboratory and test data reviewed and discussed with patient. See patient instructions also for other medical advice given    There are no discontinued medications. Follow-up Disposition:  Return in about 6 months (around 4/2/2018), or if symptoms worsen or fail to improve.

## 2018-04-05 ENCOUNTER — OFFICE VISIT (OUTPATIENT)
Dept: CARDIOLOGY CLINIC | Age: 78
End: 2018-04-05

## 2018-04-05 VITALS
BODY MASS INDEX: 32.62 KG/M2 | HEART RATE: 64 BPM | HEIGHT: 71 IN | DIASTOLIC BLOOD PRESSURE: 76 MMHG | SYSTOLIC BLOOD PRESSURE: 143 MMHG | WEIGHT: 233 LBS

## 2018-04-05 DIAGNOSIS — I10 ESSENTIAL HYPERTENSION, BENIGN: ICD-10-CM

## 2018-04-05 DIAGNOSIS — I50.32 CHRONIC DIASTOLIC HEART FAILURE (HCC): Primary | ICD-10-CM

## 2018-04-05 DIAGNOSIS — E78.00 PURE HYPERCHOLESTEROLEMIA: ICD-10-CM

## 2018-04-05 DIAGNOSIS — I34.0 NON-RHEUMATIC MITRAL REGURGITATION: ICD-10-CM

## 2018-04-05 DIAGNOSIS — E66.9 OBESITY (BMI 30.0-34.9): ICD-10-CM

## 2018-04-05 NOTE — PROGRESS NOTES
HISTORY OF PRESENT ILLNESS  Dinora Johnson is a 65 y.o. male. HPI Comments: Syncope 3-4 hrs post 1st dose of hytrin in 1/14, gradual fall with mild sweating, urinary incontinence, no vomiting  Similar episode in 2005 approx  No recurrence since not taking hytrin in 1/14 5/16 restarted diovan and d/og lisinopril    CHF   The history is provided by the medical records. This is a chronic problem. Associated symptoms include shortness of breath. Pertinent negatives include no chest pain and no headaches. Hypertension   The history is provided by the patient and medical records. Associated symptoms include shortness of breath. Pertinent negatives include no chest pain and no headaches. Valvular Heart Disease   The history is provided by the medical records (mr). Associated symptoms include shortness of breath. Pertinent negatives include no chest pain and no headaches. Cholesterol Problem   The history is provided by the medical records. This is a chronic problem. Associated symptoms include shortness of breath. Pertinent negatives include no chest pain and no headaches. Shortness of Breath   The history is provided by the patient. This is a new problem. The problem occurs intermittently. The current episode started more than 1 week ago (few yrs, mainly fatigue). The problem has not changed since onset. Associated symptoms include leg swelling. Pertinent negatives include no fever, no headaches, no cough, no wheezing, no PND, no orthopnea, no chest pain, no vomiting, no rash and no claudication. The problem's precipitants include exercise (weed eating, rushing to walk; 11/15 cutting grass, weed eating). Leg Swelling   The history is provided by the patient. This is a chronic problem. The current episode started more than 1 week ago. The problem occurs daily. The problem has not changed since onset. Associated symptoms include shortness of breath. Pertinent negatives include no chest pain and no headaches. The symptoms are aggravated by standing. The symptoms are relieved by sleep. Review of Systems   Constitutional: Negative for chills, fever, malaise/fatigue and weight loss. HENT: Negative for nosebleeds. Eyes: Negative for discharge. Respiratory: Positive for shortness of breath. Negative for cough and wheezing. Cardiovascular: Positive for leg swelling. Negative for chest pain, palpitations, orthopnea, claudication and PND. Gastrointestinal: Negative for diarrhea, nausea and vomiting. Genitourinary: Negative for dysuria and hematuria. Musculoskeletal: Negative for joint pain. Skin: Negative for rash. Neurological: Negative for dizziness, seizures, loss of consciousness and headaches. Endo/Heme/Allergies: Negative for polydipsia. Does not bruise/bleed easily. Psychiatric/Behavioral: Negative for depression and substance abuse. The patient does not have insomnia.       No Known Allergies    Past Medical History:   Diagnosis Date    Allergic sinusitis     BPH (benign prostatic hyperplasia)     Chronic diastolic heart failure (HCC) 2/11/2014    Congestive heart failure, unspecified     DJD (degenerative joint disease)     Erectile dysfunction     Essential hypertension     Hydrocele     Hyperlipidemia 5/2/2016    Mitral valve disorders(424.0) 2/11/2014 1/14 mild MR     Obesity, unspecified 11/10/2014    Syncope and collapse 1/14/2014 1/14 likely vagal, post 1st dose hytrin 5/15 no recurrence        Family History   Problem Relation Age of Onset    Heart Attack Father 61    Heart Attack Brother 58    Prostate Cancer Other     Stroke Other     Hypertension Other        Social History   Substance Use Topics    Smoking status: Former Smoker     Quit date: 1/14/1974    Smokeless tobacco: Never Used    Alcohol use No        Current Outpatient Prescriptions   Medication Sig    finasteride (PROSCAR) 5 mg tablet take 1 tablet by mouth once daily    silodosin (RAPAFLO) 8 mg capsule take 1 capsule by mouth once daily with BREAKFAST    metoprolol tartrate (LOPRESSOR) 50 mg tablet take 1 tablet by mouth twice a day    atorvastatin (LIPITOR) 10 mg tablet Take  by mouth daily.  acetaminophen (TYLENOL) 500 mg tablet Take  by mouth daily.  valsartan-hydrochlorothiazide (DIOVAN-HCT) 320-25 mg per tablet     aspirin delayed-release 81 mg tablet Take  by mouth daily.  metFORMIN (GLUCOPHAGE) 500 mg tablet Take  by mouth daily (with breakfast).  ergocalciferol (VITAMIN D2) 50,000 unit capsule Take 50,000 Units by mouth daily.  amLODIPine (NORVASC) 5 mg tablet take 1 tablet by mouth once daily    miscellaneous medical supply (T.E.D. ANTI-EMBOLISM STOCKING) misc 1 Each by Does Not Apply route daily.  montelukast (SINGULAIR) 10 mg tablet Take 10 mg by mouth daily. No current facility-administered medications for this visit. Past Surgical History:   Procedure Laterality Date    HX CIRCUMCISION      HX HERNIA REPAIR Bilateral        Diagnostic Studies:  CARDIOLOGY STUDIES 1/16/2014   Exercise Nuclear Stress Test Result red capacity, nl scan, nl EF   Echocardiogram - Complete Result 55%EF, mild DD, mild MR/dil LA   Some recent data might be hidden       Visit Vitals    /76    Pulse 64    Ht 5' 11\" (1.803 m)    Wt 233 lb (105.7 kg)    BMI 32.5 kg/m2       Mr. Camilla Rodriguez has a reminder for a \"due or due soon\" health maintenance. I have asked that he contact his primary care provider for follow-up on this health maintenance. Physical Exam   Constitutional: He is oriented to person, place, and time. He appears well-developed and well-nourished. No distress. HENT:   Head: Normocephalic and atraumatic. Mouth/Throat: Normal dentition. Eyes: Right eye exhibits no discharge. Left eye exhibits no discharge. No scleral icterus. Neck: Neck supple. No JVD present. Carotid bruit is not present. No thyromegaly present.    Cardiovascular: Normal rate, regular rhythm, S1 normal, S2 normal, normal heart sounds and intact distal pulses. Exam reveals no gallop and no friction rub. No murmur heard. Pulmonary/Chest: Effort normal and breath sounds normal. He has no wheezes. He has no rales. Abdominal: Soft. He exhibits no mass. There is no tenderness. Musculoskeletal: He exhibits edema (trace). Lymphadenopathy:        Right cervical: No superficial cervical adenopathy present. Left cervical: No superficial cervical adenopathy present. Neurological: He is alert and oriented to person, place, and time. Skin: Skin is warm and dry. No rash noted. Psychiatric: He has a normal mood and affect. His behavior is normal.       ASSESSMENT and PLAN    Discussed the patient's above normal BMI with him. I have recommended the following interventions: dietary management education, guidance, and counseling . The BMI follow up plan is as follows: BMI is out of normal parameters and plan is as follows: I have counseled this patient on diet and exercise regimens    4/18 Ruthie Fong; use Teds rather than more diuresis due to h/o syncope        Diagnoses and all orders for this visit:    1. Chronic diastolic heart failure ([address]    2. Essential hypertension, benign  -     AMB POC EKG ROUTINE W/ 12 LEADS, INTER & REP    3. Non-rheumatic mitral regurgitation  Comments:  1/14 mild MR      4. Pure hypercholesterolemia  Comments:  5/15 LDL97  get labs from PCP    5. Obesity (BMI 30.0-34. 9)        Pertinent laboratory and test data reviewed and discussed with patient. See patient instructions also for other medical advice given    There are no discontinued medications.     Follow-up Disposition:  Return in about 9 months (around 1/5/2019), or if symptoms worsen or fail to improve, for post test.

## 2018-04-05 NOTE — PATIENT INSTRUCTIONS
There are no discontinued medications. Body Mass Index: Care Instructions  Your Care Instructions    Body mass index (BMI) can help you see if your weight is raising your risk for health problems. It uses a formula to compare how much you weigh with how tall you are. · A BMI lower than 18.5 is considered underweight. · A BMI between 18.5 and 24.9 is considered healthy. · A BMI between 25 and 29.9 is considered overweight. A BMI of 30 or higher is considered obese. If your BMI is in the normal range, it means that you have a lower risk for weight-related health problems. If your BMI is in the overweight or obese range, you may be at increased risk for weight-related health problems, such as high blood pressure, heart disease, stroke, arthritis or joint pain, and diabetes. If your BMI is in the underweight range, you may be at increased risk for health problems such as fatigue, lower protection (immunity) against illness, muscle loss, bone loss, hair loss, and hormone problems. BMI is just one measure of your risk for weight-related health problems. You may be at higher risk for health problems if you are not active, you eat an unhealthy diet, or you drink too much alcohol or use tobacco products. Follow-up care is a key part of your treatment and safety. Be sure to make and go to all appointments, and call your doctor if you are having problems. It's also a good idea to know your test results and keep a list of the medicines you take. How can you care for yourself at home? · Practice healthy eating habits. This includes eating plenty of fruits, vegetables, whole grains, lean protein, and low-fat dairy. · If your doctor recommends it, get more exercise. Walking is a good choice. Bit by bit, increase the amount you walk every day. Try for at least 30 minutes on most days of the week. · Do not smoke. Smoking can increase your risk for health problems.  If you need help quitting, talk to your doctor about stop-smoking programs and medicines. These can increase your chances of quitting for good. · Limit alcohol to 2 drinks a day for men and 1 drink a day for women. Too much alcohol can cause health problems. If you have a BMI higher than 25  · Your doctor may do other tests to check your risk for weight-related health problems. This may include measuring the distance around your waist. A waist measurement of more than 40 inches in men or 35 inches in women can increase the risk of weight-related health problems. · Talk with your doctor about steps you can take to stay healthy or improve your health. You may need to make lifestyle changes to lose weight and stay healthy, such as changing your diet and getting regular exercise. If you have a BMI lower than 18.5  · Your doctor may do other tests to check your risk for health problems. · Talk with your doctor about steps you can take to stay healthy or improve your health. You may need to make lifestyle changes to gain or maintain weight and stay healthy, such as getting more healthy foods in your diet and doing exercises to build muscle. Where can you learn more? Go to http://wade-[place]iana.info/. Enter S176 in the search box to learn more about \"Body Mass Index: Care Instructions. \"  Current as of: October 13, 2016  Content Version: 11.4  © 8737-0432 [employer]. Care instructions adapted under license by [employer] (which disclaims liability or warranty for this information). If you have questions about a medical condition or this instruction, always ask your healthcare professional. [place] [address] any warranty or liability for your use of this information.

## 2018-04-05 NOTE — MR AVS SNAPSHOT
[address] [facility], [place], operated by [facility] 
 
 
 [id number] [facility] 
[phone] Patient: Trent Hendrickson MRN: [id number] Bianca Santana Visit Information Date & Time Provider Department Dept. Phone Encounter #  
 4/5/2018  9:30 AM Darryle Pavlov, MD [facility] [phone] Follow-up Instructions Return in about 9 months (around 1/5/2019), or if symptoms worsen or fail to improve, for post test.  
  
Your Appointments 4/5/2018  9:30 AM  
Office Visit with Darryle Pavlov, MD  
[facility] ([address]) Appt Note: 6 month follow up; confirmed appt/dg [address] [place] [address] [address]  
  
   
 [address] [phone] [place] [address] Upcoming Health Maintenance Date Due DTaP/Tdap/Td series (1 - Tdap) 8/15/1960 ZOSTER VACCINE AGE 60> 6/15/1999 GLAUCOMA SCREENING Q2Y 8/15/2004 Influenza Age 5 to Adult 8/1/2017 MEDICARE YEARLY EXAM 3/14/2018 Pneumococcal 65+ Low/Medium Risk (2 of 2 - PPSV23) 10/31/2018 Allergies as of 4/5/2018  Review Complete On: 4/5/2018 By: Darryle Pavlov, MD  
 No Known Allergies Current Immunizations  Never Reviewed No immunizations on file. Not reviewed this visit You Were Diagnosed With   
  
 Codes Comments Chronic diastolic heart failure (HCC)    -  Primary ICD-10-CM: I50.32 
ICD-9-CM: 428.32 Essential hypertension, benign     ICD-10-CM: I10 
ICD-9-CM: 401.1 Non-rheumatic mitral regurgitation     ICD-10-CM: I34.0 ICD-9-CM: 424.0 1/14 mild MR Pure hypercholesterolemia     ICD-10-CM: E78.00 ICD-9-CM: 272.0 5/15 LDL97 
get labs from PCP Obesity (BMI 30.0-34.9)     ICD-10-CM: R04.5 ICD-9-CM: 278.00 Vitals BP Pulse Height(growth percentile) Weight(growth percentile) BMI Smoking Status 143/76 64 5' 11\" (1.803 m) 233 lb (105.7 kg) 32.5 kg/m2 Former Smoker Vitals History BMI and BSA Data Body Mass Index Body Surface Area 32.5 kg/m 2 2.3 m 2 Preferred Pharmacy Pharmacy Name Phone [address] [facility], [address] Your Updated Medication List  
  
   
This list is accurate as of 4/5/18  9:26 AM.  Always use your most recent med list.  
  
  
  
  
 acetaminophen 500 mg tablet Commonly known as:  TYLENOL Take  by mouth daily. amLODIPine 5 mg tablet Commonly known as:  NORVASC  
take 1 tablet by mouth once daily  
  
 aspirin delayed-release 81 mg tablet Take  by mouth daily. atorvastatin 10 mg tablet Commonly known as:  LIPITOR Take  by mouth daily. finasteride 5 mg tablet Commonly known as:  PROSCAR  
take 1 tablet by mouth once daily  
  
 metFORMIN 500 mg tablet Commonly known as:  GLUCOPHAGE Take  by mouth daily (with breakfast). metoprolol tartrate 50 mg tablet Commonly known as:  LOPRESSOR  
take 1 tablet by mouth twice a day  
  
 miscellaneous medical supply Misc Commonly known as:  T.E.D. ANTI-EMBOLISM STOCKING  
1 Each by Does Not Apply route daily. montelukast 10 mg tablet Commonly known as:  SINGULAIR Take 10 mg by mouth daily. silodosin 8 mg capsule Commonly known as:  RAPAFLO  
take 1 capsule by mouth once daily with BREAKFAST  
  
 valsartan-hydroCHLOROthiazide 320-25 mg per tablet Commonly known as:  DIOVAN-HCT  
  
 VITAMIN D2 50,000 unit capsule Generic drug:  ergocalciferol Take 50,000 Units by mouth daily. We Performed the Following AMB POC EKG ROUTINE W/ 12 LEADS, INTER & REP [30081 CPT(R)] Follow-up Instructions Return in about 9 months (around 1/5/2019), or if symptoms worsen or fail to improve, for post test.  
  
  
Patient Instructions There are no discontinued medications. Body Mass Index: Care Instructions Your Care Instructions Body mass index (BMI) can help you see if your weight is raising your risk for health problems. It uses a formula to compare how much you weigh with how tall you are. · A BMI lower than 18.5 is considered underweight. · A BMI between 18.5 and 24.9 is considered healthy. · A BMI between 25 and 29.9 is considered overweight. A BMI of 30 or higher is considered obese. If your BMI is in the normal range, it means that you have a lower risk for weight-related health problems. If your BMI is in the overweight or obese range, you may be at increased risk for weight-related health problems, such as high blood pressure, heart disease, stroke, arthritis or joint pain, and diabetes. If your BMI is in the underweight range, you may be at increased risk for health problems such as fatigue, lower protection (immunity) against illness, muscle loss, bone loss, hair loss, and hormone problems. BMI is just one measure of your risk for weight-related health problems. You may be at higher risk for health problems if you are not active, you eat an unhealthy diet, or you drink too much alcohol or use tobacco products. Follow-up care is a key part of your treatment and safety. Be sure to make and go to all appointments, and call your doctor if you are having problems. It's also a good idea to know your test results and keep a list of the medicines you take. How can you care for yourself at home? · Practice healthy eating habits. This includes eating plenty of fruits, vegetables, whole grains, lean protein, and low-fat dairy. · If your doctor recommends it, get more exercise. Walking is a good choice. Bit by bit, increase the amount you walk every day. Try for at least 30 minutes on most days of the week. · Do not smoke. Smoking can increase your risk for health problems. If you need help quitting, talk to your doctor about stop-smoking programs and medicines. These can increase your chances of quitting for good. · Limit alcohol to 2 drinks a day for men and 1 drink a day for women. Too much alcohol can cause health problems. If you have a BMI higher than 25 · Your doctor may do other tests to check your risk for weight-related health problems. This may include measuring the distance around your waist. A waist measurement of more than 40 inches in men or 35 inches in women can increase the risk of weight-related health problems. · Talk with your doctor about steps you can take to stay healthy or improve your health. You may need to make lifestyle changes to lose weight and stay healthy, such as changing your diet and getting regular exercise. If you have a BMI lower than 18.5 · Your doctor may do other tests to check your risk for health problems. · Talk with your doctor about steps you can take to stay healthy or improve your health. You may need to make lifestyle changes to gain or maintain weight and stay healthy, such as getting more healthy foods in your diet and doing exercises to build muscle. Where can you learn more? Go to http://wade-indiana.info/. Enter S176 in the search box to learn more about \"Body Mass Index: Care Instructions. \" Current as of: October 13, 2016 Content Version: 11.4 © 8567-3284 [employer]. Care instructions adapted under license by [employer] (which disclaims liability or warranty for this information). If you have questions about a medical condition or this instruction, always ask your healthcare professional. [place] [address] any warranty or liability for your use of this information. Introducing \[facility]\"" & HEALTH SERVICES! [facility] introduces VoodooVox patient portal. Now you can access parts of your medical record, email your doctor's office, and request medication refills online. 1. In your internet browser, go to [email or web address] 2. Click on the First Time User? Click Here link in the Sign In box. You will see the New Member Sign Up page. 3. Enter your Signdat Access Code exactly as it appears below. You will not need to use this code after youve completed the sign-up process. If you do not sign up before the expiration date, you must request a new code. · Signdat Access Code: [email or web address] Expires: 7/4/2018  8:58 AM 
 
4. Enter the last four digits of your Social Security Number (xxxx) and Date of Birth (mm/dd/yyyy) as indicated and click Submit. You will be taken to the next sign-up page. 5. Create a Signdat ID. This will be your Signdat login ID and cannot be changed, so think of one that is secure and easy to remember. 6. Create a Signdat password. You can change your password at any time. 7. Enter your Password Reset Question and Answer. This can be used at a later time if you forget your password. 8. Enter your e-mail address. You will receive e-mail notification when new information is available in [address] 9. Click Sign Up. You can now view and download portions of your medical record. 10. Click the Download Summary menu link to download a portable copy of your medical information. If you have questions, please visit the Frequently Asked Questions section of the Signdat website. Remember, Signdat is NOT to be used for urgent needs. For medical emergencies, dial 911. Now available from your iPhone and Android! Please provide this summary of care documentation to your next provider. Your primary care clinician is listed as Jacob Ange. If you have any questions after today's visit, please call [phone].

## 2018-04-05 NOTE — LETTER
Marcus Riddlene 1940 
 
4/5/2018 Dear Donna Mcallister MD 
 
I had the pleasure of evaluating  Mr. Gunnar Schaefer in office today. Below are the relevant portions of my assessment and plan of care. ICD-10-CM ICD-9-CM 1. Chronic diastolic heart failure (HCC) I50.32 428.32   
2. Essential hypertension, benign I10 401.1 AMB POC EKG ROUTINE W/ 12 LEADS, INTER & REP 3. Non-rheumatic mitral regurgitation I34.0 424.0   
 1/14 mild MR 
  
4. Pure hypercholesterolemia E78.00 272.0   
 5/15 LDL97 
get labs from PCP  
5. Obesity (BMI 30.0-34. 9) E66.9 278.00 Current Outpatient Prescriptions Medication Sig Dispense Refill  finasteride (PROSCAR) 5 mg tablet take 1 tablet by mouth once daily 90 Tab 3  
 silodosin (RAPAFLO) 8 mg capsule take 1 capsule by mouth once daily with BREAKFAST 90 Cap 3  
 metoprolol tartrate (LOPRESSOR) 50 mg tablet take 1 tablet by mouth twice a day 60 Tab 6  
 atorvastatin (LIPITOR) 10 mg tablet Take  by mouth daily.  acetaminophen (TYLENOL) 500 mg tablet Take  by mouth daily.  valsartan-hydrochlorothiazide (DIOVAN-HCT) 320-25 mg per tablet   1  
 aspirin delayed-release 81 mg tablet Take  by mouth daily.  metFORMIN (GLUCOPHAGE) 500 mg tablet Take  by mouth daily (with breakfast).  ergocalciferol (VITAMIN D2) 50,000 unit capsule Take 50,000 Units by mouth daily.  amLODIPine (NORVASC) 5 mg tablet take 1 tablet by mouth once daily 30 Tab 3  
 miscellaneous medical supply (T.E.D. ANTI-EMBOLISM STOCKING) misc 1 Each by Does Not Apply route daily. 2 Each 0  
 montelukast (SINGULAIR) 10 mg tablet Take 10 mg by mouth daily. Orders Placed This Encounter  AMB POC EKG ROUTINE W/ 12 LEADS, INTER & REP Order Specific Question:   Reason for Exam: Answer:   htn If you have questions, please do not hesitate to call me. I look forward to following Mr. Gunnar Schaefer along with you. Sincerely, Keke Juárez MD

## 2018-04-05 NOTE — PROGRESS NOTES
1. Have you been to the ER, urgent care clinic since your last visit? Hospitalized since your last visit?     no    2. Have you seen or consulted any other health care providers outside of the [facility] since your last visit? Include any pap smears or colon screening.     yes, pcp    3. Since your last visit, have you had any of the following symptoms? Swelling in leg and feet    4. Have you had any blood work, X-rays or cardiac testing? Yes, pcp             5.  Where do you normally have your labs drawn?   pcp    6. Do you need any refills today?    no

## 2018-12-15 LAB — LDL-C, EXTERNAL: 104

## 2019-01-11 ENCOUNTER — OFFICE VISIT (OUTPATIENT)
Dept: CARDIOLOGY CLINIC | Age: 79
End: 2019-01-11

## 2019-01-11 VITALS
SYSTOLIC BLOOD PRESSURE: 113 MMHG | HEIGHT: 71 IN | HEART RATE: 69 BPM | DIASTOLIC BLOOD PRESSURE: 60 MMHG | WEIGHT: 232 LBS | BODY MASS INDEX: 32.48 KG/M2

## 2019-01-11 DIAGNOSIS — I50.32 CHRONIC DIASTOLIC HEART FAILURE (HCC): Primary | ICD-10-CM

## 2019-01-11 DIAGNOSIS — I34.0 NON-RHEUMATIC MITRAL REGURGITATION: ICD-10-CM

## 2019-01-11 DIAGNOSIS — I10 ESSENTIAL HYPERTENSION, BENIGN: ICD-10-CM

## 2019-01-11 DIAGNOSIS — R79.89 ELEVATED LFTS: ICD-10-CM

## 2019-01-11 DIAGNOSIS — E66.9 OBESITY (BMI 30.0-34.9): ICD-10-CM

## 2019-01-11 DIAGNOSIS — E78.00 PURE HYPERCHOLESTEROLEMIA: ICD-10-CM

## 2019-01-11 RX ORDER — ATORVASTATIN CALCIUM 10 MG/1
10 TABLET, FILM COATED ORAL DAILY
Qty: 30 TAB | Refills: 2 | Status: SHIPPED | OUTPATIENT
Start: 2019-01-11 | End: 2019-07-22

## 2019-01-11 NOTE — PATIENT INSTRUCTIONS
There are no discontinued medications. Body Mass Index: Care Instructions  Your Care Instructions    Body mass index (BMI) can help you see if your weight is raising your risk for health problems. It uses a formula to compare how much you weigh with how tall you are. · A BMI lower than 18.5 is considered underweight. · A BMI between 18.5 and 24.9 is considered healthy. · A BMI between 25 and 29.9 is considered overweight. A BMI of 30 or higher is considered obese. If your BMI is in the normal range, it means that you have a lower risk for weight-related health problems. If your BMI is in the overweight or obese range, you may be at increased risk for weight-related health problems, such as high blood pressure, heart disease, stroke, arthritis or joint pain, and diabetes. If your BMI is in the underweight range, you may be at increased risk for health problems such as fatigue, lower protection (immunity) against illness, muscle loss, bone loss, hair loss, and hormone problems. BMI is just one measure of your risk for weight-related health problems. You may be at higher risk for health problems if you are not active, you eat an unhealthy diet, or you drink too much alcohol or use tobacco products. Follow-up care is a key part of your treatment and safety. Be sure to make and go to all appointments, and call your doctor if you are having problems. It's also a good idea to know your test results and keep a list of the medicines you take. How can you care for yourself at home? · Practice healthy eating habits. This includes eating plenty of fruits, vegetables, whole grains, lean protein, and low-fat dairy. · If your doctor recommends it, get more exercise. Walking is a good choice. Bit by bit, increase the amount you walk every day. Try for at least 30 minutes on most days of the week. · Do not smoke. Smoking can increase your risk for health problems.  If you need help quitting, talk to your doctor about stop-smoking programs and medicines. These can increase your chances of quitting for good. · Limit alcohol to 2 drinks a day for men and 1 drink a day for women. Too much alcohol can cause health problems. If you have a BMI higher than 25  · Your doctor may do other tests to check your risk for weight-related health problems. This may include measuring the distance around your waist. A waist measurement of more than 40 inches in men or 35 inches in women can increase the risk of weight-related health problems. · Talk with your doctor about steps you can take to stay healthy or improve your health. You may need to make lifestyle changes to lose weight and stay healthy, such as changing your diet and getting regular exercise. If you have a BMI lower than 18.5  · Your doctor may do other tests to check your risk for health problems. · Talk with your doctor about steps you can take to stay healthy or improve your health. You may need to make lifestyle changes to gain or maintain weight and stay healthy, such as getting more healthy foods in your diet and doing exercises to build muscle. Where can you learn more? Go to http://wade-indiana.info/. Enter S176 in the search box to learn more about \"Body Mass Index: Care Instructions. \"  Current as of: October 13, 2016  Content Version: 11.4  © 0450-5297 [employer]. Care instructions adapted under license by [employer] (which disclaims liability or warranty for this information). If you have questions about a medical condition or this instruction, always ask your healthcare professional. [place] [address] any warranty or liability for your use of this information.

## 2019-01-11 NOTE — LETTER
Gus Quezada 1940 
 
1/11/2019 Dear Michelle Stein MD 
 
I had the pleasure of evaluating  Mr. Erica Quezada in office today. Below are the relevant portions of my assessment and plan of care. ICD-10-CM ICD-9-CM 1. Chronic diastolic heart failure (HCC) I50.32 428.32   
2. Non-rheumatic mitral regurgitation I34.0 424.0 3. Pure hypercholesterolemia E78.00 272.0 atorvastatin (LIPITOR) 10 mg tablet LIPID PANEL  
   HEPATIC FUNCTION PANEL 4. Essential hypertension, benign I10 401.1 5. Obesity (BMI 30.0-34. 9) E66.9 278.00 Current Outpatient Medications Medication Sig Dispense Refill  atorvastatin (LIPITOR) 10 mg tablet Take 1 Tab by mouth daily. 30 Tab 2  
 candesartan-hydroCHLOROthiazide (ATACAND HCT) 32-25 mg tab per tablet   1  
 silodosin (RAPAFLO) 8 mg capsule take 1 capsule by mouth once daily with BREAKFAST 90 Cap 3  
 finasteride (PROSCAR) 5 mg tablet take 1 tablet by mouth once daily 90 Tab 3  
 metoprolol tartrate (LOPRESSOR) 50 mg tablet take 1 tablet by mouth twice a day 60 Tab 6  
 metFORMIN (GLUCOPHAGE) 500 mg tablet Take  by mouth daily (with breakfast).  amLODIPine (NORVASC) 5 mg tablet take 1 tablet by mouth once daily 30 Tab 3  
 miscellaneous medical supply (T.E.D. ANTI-EMBOLISM STOCKING) misc 1 Each by Does Not Apply route daily. 2 Each 0  
 montelukast (SINGULAIR) 10 mg tablet Take 10 mg by mouth daily. Orders Placed This Encounter  LIPID PANEL Standing Status:   Future Standing Expiration Date:   7/14/2019  
 HEPATIC FUNCTION PANEL Standing Status:   Future Standing Expiration Date:   7/14/2019  
 atorvastatin (LIPITOR) 10 mg tablet Sig: Take 1 Tab by mouth daily. Dispense:  30 Tab Refill:  2 If you have questions, please do not hesitate to call me. I look forward to following Mr. Erica Quezada along with you. Sincerely, Sariah Bocanegra MD

## 2019-01-11 NOTE — PROGRESS NOTES
HISTORY OF PRESENT ILLNESS  Rose Rodrigez is a 77 y.o. male. Syncope 3-4 hrs post 1st dose of hytrin in 1/14, gradual fall with mild sweating, urinary incontinence, no vomiting  Similar episode in 2005 approx  No recurrence since not taking hytrin in 1/14 5/16 restarted diovan and d/og lisinopril      CHF   The history is provided by the medical records. This is a chronic problem. Associated symptoms include shortness of breath. Pertinent negatives include no chest pain and no headaches. Hypertension   The history is provided by the patient and medical records. Associated symptoms include shortness of breath. Pertinent negatives include no chest pain and no headaches. Valvular Heart Disease   The history is provided by the medical records (mr). Associated symptoms include shortness of breath. Pertinent negatives include no chest pain and no headaches. Cholesterol Problem   The history is provided by the medical records. This is a chronic problem. Associated symptoms include shortness of breath. Pertinent negatives include no chest pain and no headaches. Shortness of Breath   The history is provided by the patient. This is a new problem. The problem occurs intermittently. The current episode started more than 1 week ago (few yrs, mainly fatigue). The problem has not changed since onset. Associated symptoms include leg swelling. Pertinent negatives include no fever, no headaches, no cough, no wheezing, no PND, no orthopnea, no chest pain, no vomiting, no rash and no claudication. The problem's precipitants include exercise (weed eating, rushing to walk; 11/15 cutting grass, weed eating). Leg Swelling   The history is provided by the patient. This is a chronic problem. The current episode started more than 1 week ago. The problem occurs daily. The problem has not changed since onset. Associated symptoms include shortness of breath. Pertinent negatives include no chest pain and no headaches.  The symptoms are aggravated by standing. The symptoms are relieved by sleep. Review of Systems   Constitutional: Negative for chills, fever, malaise/fatigue and weight loss. HENT: Negative for nosebleeds. Eyes: Negative for discharge. Respiratory: Positive for shortness of breath. Negative for cough and wheezing. Cardiovascular: Positive for leg swelling. Negative for chest pain, palpitations, orthopnea, claudication and PND. Gastrointestinal: Negative for diarrhea, nausea and vomiting. Genitourinary: Negative for dysuria and hematuria. Musculoskeletal: Negative for joint pain. Skin: Negative for rash. Neurological: Negative for dizziness, seizures, loss of consciousness and headaches. Endo/Heme/Allergies: Negative for polydipsia. Does not bruise/bleed easily. Psychiatric/Behavioral: Negative for depression and substance abuse. The patient does not have insomnia.       No Known Allergies    Past Medical History:   Diagnosis Date    Allergic sinusitis     BPH (benign prostatic hyperplasia)     Chronic diastolic heart failure (HCC) 2014    Congestive heart failure, unspecified     DJD (degenerative joint disease)     Erectile dysfunction     Essential hypertension     Hydrocele     Hyperlipidemia 2016    Mitral valve disorders(424.0) 2014 mild MR     Obesity, unspecified 11/10/2014    Syncope and collapse 2014 likely vagal, post 1st dose hytrin 5/15 no recurrence        Family History   Problem Relation Age of Onset    Heart Attack Father 61    Heart Attack Brother 58    Prostate Cancer Other     Stroke Other     Hypertension Other        Social History     Tobacco Use    Smoking status: Former Smoker     Last attempt to quit: 1974     Years since quittin.0    Smokeless tobacco: Never Used   Substance Use Topics    Alcohol use: No    Drug use: No        Current Outpatient Medications   Medication Sig    candesartan-hydroCHLOROthiazide (ATACAND HCT) 32-25 mg tab per tablet     silodosin (RAPAFLO) 8 mg capsule take 1 capsule by mouth once daily with BREAKFAST    finasteride (PROSCAR) 5 mg tablet take 1 tablet by mouth once daily    metoprolol tartrate (LOPRESSOR) 50 mg tablet take 1 tablet by mouth twice a day    metFORMIN (GLUCOPHAGE) 500 mg tablet Take  by mouth daily (with breakfast).  amLODIPine (NORVASC) 5 mg tablet take 1 tablet by mouth once daily    miscellaneous medical supply (T.E.D. ANTI-EMBOLISM STOCKING) misc 1 Each by Does Not Apply route daily.  montelukast (SINGULAIR) 10 mg tablet Take 10 mg by mouth daily. No current facility-administered medications for this visit. Past Surgical History:   Procedure Laterality Date    HX CIRCUMCISION      HX HERNIA REPAIR Bilateral        Diagnostic Studies:  CARDIOLOGY STUDIES 1/16/2014   Exercise Nuclear Stress Test Result red capacity, nl scan, nl EF   Echocardiogram - Complete Result 55%EF, mild DD, mild MR/dil LA   Some recent data might be hidden       Visit Vitals  /60   Pulse 69   Ht 5' 11\" (1.803 m)   Wt 105.2 kg (232 lb)   BMI 32.36 kg/m²       Mr. Elena Jordan has a reminder for a \"due or due soon\" health maintenance. I have asked that he contact his primary care provider for follow-up on this health maintenance. Physical Exam   Constitutional: He is oriented to person, place, and time. He appears well-developed and well-nourished. No distress. HENT:   Head: Normocephalic and atraumatic. Mouth/Throat: Normal dentition. Eyes: Right eye exhibits no discharge. Left eye exhibits no discharge. No scleral icterus. Neck: Neck supple. No JVD present. Carotid bruit is not present. No thyromegaly present. Cardiovascular: Normal rate, regular rhythm, S1 normal, S2 normal, normal heart sounds and intact distal pulses. Exam reveals no gallop and no friction rub. No murmur heard. Pulmonary/Chest: Effort normal and breath sounds normal. He has no wheezes.  He has no rales. Abdominal: Soft. He exhibits no mass. There is no tenderness. Musculoskeletal: He exhibits edema (1+). Lymphadenopathy:        Right cervical: No superficial cervical adenopathy present. Left cervical: No superficial cervical adenopathy present. Neurological: He is alert and oriented to person, place, and time. Skin: Skin is warm and dry. No rash noted. Psychiatric: He has a normal mood and affect. His behavior is normal.       ASSESSMENT and PLAN    Discussed the patient's above normal BMI with him. I have recommended the following interventions: dietary management education, guidance, and counseling . The BMI follow up plan is as follows: BMI is out of normal parameters and plan is as follows: I have counseled this patient on diet and exercise regimens    4/18 [employer]; use Teds rather than more diuresis due to h/o syncope  Elevated LFT: 12/18 SGOT 42, SGPT 58    Edema is mildly increased compared to last time as is also the weight. Recommended compression stockings rather than diuretics.  recently and given history of diabetes, start statins and follow-up lipids as well as LFTs. Diagnoses and all orders for this visit:    1. Chronic diastolic heart failure ([address]    2. Non-rheumatic mitral regurgitation    3. Pure hypercholesterolemia  -     atorvastatin (LIPITOR) 10 mg tablet; Take 1 Tab by mouth daily.  -     LIPID PANEL; Future  -     HEPATIC FUNCTION PANEL; Future    4. Essential hypertension, benign    5. Obesity (BMI 30.0-34.9)    6. Elevated LFTs        Pertinent laboratory and test data reviewed and discussed with patient. See patient instructions also for other medical advice given    There are no discontinued medications.     Follow-up Disposition:  Return in about 6 months (around 7/11/2019), or if symptoms worsen or fail to improve, for post test.

## 2019-01-11 NOTE — PROGRESS NOTES
1. Have you been to the ER, urgent care clinic since your last visit? Hospitalized since your last visit? No     2. Have you seen or consulted any other health care providers outside of the [address] [place] [address] since your last visit? Include any pap smears or colon screening. Yes Where: Dr Ryna Ahn/PCP     3. Since your last visit, have you had any of the following symptoms?      swelling in legs/arms. 4.  Have you had any blood work, X-rays or cardiac testing? Yes Where: Dr Aster Angeles Reason for visit: Labs         5. Where do you normally have your labs drawn? Dr Aster Angeles    6. Do you need any refills today?    No

## 2019-01-25 ENCOUNTER — TELEPHONE (OUTPATIENT)
Dept: CARDIOLOGY CLINIC | Age: 79
End: 2019-01-25

## 2019-01-25 NOTE — TELEPHONE ENCOUNTER
Patient came in and stated he is having cramps in both legs and he can not take atorvastatin 10 mg. He stated it is getting worse each day he take the medication. Please advise.

## 2019-02-07 DIAGNOSIS — E78.00 PURE HYPERCHOLESTEROLEMIA: ICD-10-CM

## 2019-07-22 ENCOUNTER — OFFICE VISIT (OUTPATIENT)
Dept: CARDIOLOGY CLINIC | Age: 79
End: 2019-07-22

## 2019-07-22 VITALS
HEIGHT: 71 IN | HEART RATE: 66 BPM | BODY MASS INDEX: 32.9 KG/M2 | DIASTOLIC BLOOD PRESSURE: 53 MMHG | WEIGHT: 235 LBS | SYSTOLIC BLOOD PRESSURE: 158 MMHG

## 2019-07-22 DIAGNOSIS — E78.00 PURE HYPERCHOLESTEROLEMIA: ICD-10-CM

## 2019-07-22 DIAGNOSIS — I10 ESSENTIAL HYPERTENSION, BENIGN: ICD-10-CM

## 2019-07-22 DIAGNOSIS — I50.32 CHRONIC DIASTOLIC HEART FAILURE (HCC): Primary | ICD-10-CM

## 2019-07-22 DIAGNOSIS — I34.0 NON-RHEUMATIC MITRAL REGURGITATION: ICD-10-CM

## 2019-07-22 DIAGNOSIS — E66.9 OBESITY (BMI 30.0-34.9): ICD-10-CM

## 2019-07-22 RX ORDER — PRAVASTATIN SODIUM 20 MG/1
20 TABLET ORAL
Qty: 90 TAB | Refills: 2 | Status: SHIPPED | OUTPATIENT
Start: 2019-07-22 | End: 2020-01-23 | Stop reason: SDUPTHER

## 2019-07-22 NOTE — PATIENT INSTRUCTIONS
After the recommended changes have been made in blood pressure medicines, patient advised to keep BP/HR(pulse rate) chart twice daily and bring us results in next 2 weeks or so. Patient may send the results via \"My Chart\" if desired. Please rest for 5-10 minutes before checking blood pressure    Begin Pravastatin 20 mg - 1 tab nightly at bedtime  Have fasting blood work drawn in 6 weeks. Low sodium diet    Heart-Healthy Diet: Care Instructions  Your Care Instructions    A heart-healthy diet has lots of vegetables, fruits, nuts, beans, and whole grains, and is low in salt. It limits foods that are high in saturated fat, such as meats, cheeses, and fried foods. It may be hard to change your diet, but even small changes can lower your risk of heart attack and heart disease. Follow-up care is a key part of your treatment and safety. Be sure to make and go to all appointments, and call your doctor if you are having problems. It's also a good idea to know your test results and keep a list of the medicines you take. How can you care for yourself at home? Watch your portions  · Learn what a serving is. A \"serving\" and a \"portion\" are not always the same thing. Make sure that you are not eating larger portions than are recommended. For example, a serving of pasta is ½ cup. A serving size of meat is 2 to 3 ounces. A 3-ounce serving is about the size of a deck of cards. Measure serving sizes until you are good at [place]" them. Keep in mind that restaurants often serve portions that are 2 or 3 times the size of one serving. · To keep your energy level up and keep you from feeling hungry, eat often but in smaller portions. · Eat only the number of calories you need to stay at a healthy weight. If you need to lose weight, eat fewer calories than your body burns (through exercise and other physical activity). Eat more fruits and vegetables  · Eat a variety of fruit and vegetables every day.  Dark green, deep orange, red, or yellow fruits and vegetables are especially good for you. Examples include spinach, carrots, peaches, and berries. · Keep carrots, celery, and other veggies handy for snacks. Buy fruit that is in season and store it where you can see it so that you will be tempted to eat it. · Cook dishes that have a lot of veggies in them, such as stir-fries and soups. Limit saturated and trans fat  · Read food labels, and try to avoid saturated and trans fats. They increase your risk of heart disease. Trans fat is found in many processed foods such as cookies and crackers. · Use olive or canola oil when you cook. Try cholesterol-lowering spreads, such as Benecol or Take Control. · Bake, broil, grill, or steam foods instead of frying them. · Choose lean meats instead of high-fat meats such as hot dogs and sausages. Cut off all visible fat when you prepare meat. · Eat fish, skinless poultry, and meat alternatives such as soy products instead of high-fat meats. Soy products, such as tofu, may be especially good for your heart. · Choose low-fat or fat-free milk and dairy products. Eat fish  · Eat at least two servings of fish a week. Certain fish, such as salmon and tuna, contain omega-3 fatty acids, which may help reduce your risk of heart attack. Eat foods high in fiber  · Eat a variety of grain products every day. Include whole-grain foods that have lots of fiber and nutrients. Examples of whole-grain foods include oats, whole wheat bread, and brown rice. · Buy whole-grain breads and cereals, instead of white bread or pastries. Limit salt and sodium  · Limit how much salt and sodium you eat to help lower your blood pressure. · Taste food before you salt it. Add only a little salt when you think you need it. With time, your taste buds will adjust to less salt. · Eat fewer snack items, fast foods, and other high-salt, processed foods. Check food labels for the amount of sodium in packaged foods.   · Choose low-sodium versions of canned goods (such as soups, vegetables, and beans). Limit sugar  · Limit drinks and foods with added sugar. These include candy, desserts, and soda pop. Limit alcohol  · Limit alcohol to no more than 2 drinks a day for men and 1 drink a day for women. Too much alcohol can cause health problems. When should you call for help? Watch closely for changes in your health, and be sure to contact your doctor if:    · You would like help planning heart-healthy meals. Where can you learn more? Go to http://wade-indiana.info/. Enter V137 in the search box to learn more about \"Heart-Healthy Diet: Care Instructions. \"  Current as of: July 22, 2018  Content Version: 12.1  © 9242-3825 [employer]. Care instructions adapted under license by [employer] (which disclaims liability or warranty for this information). If you have questions about a medical condition or this instruction, always ask your healthcare professional. [address] any warranty or liability for your use of this information. Eveo Activation    Thank you for requesting access to [facility]. Please follow the instructions below to securely access and download your online medical record. Eveo allows you to send messages to your doctor, view your test results, renew your prescriptions, schedule appointments, and more. How Do I Sign Up? 1. In your internet browser, go to [email or web address]. 2. Click on the First Time User? Click Here link in the Sign In box. You will see the New Member Sign Up page. 3. Enter your Eveo Access Code exactly as it appears below. You will not need to use this code after youve completed the sign-up process. If you do not sign up before the expiration date, you must request a new code.     Eveo Access Code: [email or web address]  Expires: 9/5/2019  8:21 AM (This is the date your Eveo access code will )    4. Enter the last four digits of your Social Security Number (xxxx) and Date of Birth (mm/dd/yyyy) as indicated and click Submit. You will be taken to the next sign-up page. 5. Create a PathoQuest ID. This will be your [facility]oQuest login ID and cannot be changed, so think of one that is secure and easy to remember. 6. Create a [facility]oQuest password. You can change your password at any time. 7. Enter your Password Reset Question and Answer. This can be used at a later time if you forget your password. 8. Enter your e-mail address. You will receive e-mail notification when new information is available in [address] 9. Click Sign Up. You can now view and download portions of your medical record. 10. Click the Download Summary menu link to download a portable copy of your medical information. Additional Information    If you have questions, please visit the Frequently Asked Questions section of the [facility]oQuest website at [email or web address] Remember, [facility]oQuest is NOT to be used for urgent needs. For medical emergencies, dial 911.

## 2019-07-22 NOTE — PROGRESS NOTES
HISTORY OF PRESENT ILLNESS  Sumit Gary is a 77 y.o. male. Syncope 3-4 hrs post 1st dose of hytrin in 1/14, gradual fall with mild sweating, urinary incontinence, no vomiting  Similar episode in 2005 approx  No recurrence since not taking hytrin in 1/14 5/16 restarted diovan and d/og lisinopril    7/2019 - Stopped taking Atorvastatin due to myalgias    Valvular Heart Disease   The history is provided by the medical records (mr). Pertinent negatives include no chest pain, no headaches and no shortness of breath. Shortness of Breath   The history is provided by the patient. This is a new problem. The current episode started more than 1 week ago (few yrs, mainly fatigue). The problem has been resolved. Associated symptoms include leg swelling. Pertinent negatives include no fever, no headaches, no cough, no wheezing, no PND, no orthopnea, no chest pain, no vomiting, no rash and no claudication. The problem's precipitants include exercise (weed eating, rushing to walk; 11/15 cutting grass, weed eating). CHF   The history is provided by the medical records. This is a chronic problem. Pertinent negatives include no chest pain, no headaches and no shortness of breath. Hypertension   The history is provided by the patient and medical records. Pertinent negatives include no chest pain, no headaches and no shortness of breath. Cholesterol Problem   The history is provided by the medical records. This is a chronic problem. Pertinent negatives include no chest pain, no headaches and no shortness of breath. Leg Swelling   The history is provided by the patient. This is a chronic problem. The current episode started more than 1 week ago. The problem occurs daily. The problem has not changed since onset. Pertinent negatives include no chest pain, no headaches and no shortness of breath. The symptoms are aggravated by standing. The symptoms are relieved by sleep.        Review of Systems   Constitutional: Negative for chills, fever, malaise/fatigue and weight loss. HENT: Negative for nosebleeds. Eyes: Negative for discharge. Respiratory: Negative for cough, shortness of breath and wheezing. Cardiovascular: Positive for leg swelling. Negative for chest pain, palpitations, orthopnea, claudication and PND. Gastrointestinal: Negative for diarrhea, nausea and vomiting. Genitourinary: Negative for dysuria and hematuria. Musculoskeletal: Negative for joint pain. Skin: Negative for rash. Neurological: Negative for dizziness, seizures, loss of consciousness and headaches. Endo/Heme/Allergies: Negative for polydipsia. Does not bruise/bleed easily. Psychiatric/Behavioral: Negative for depression and substance abuse. The patient does not have insomnia. No Known Allergies    Past Medical History:   Diagnosis Date    Allergic sinusitis     BPH (benign prostatic hyperplasia)     Chronic diastolic heart failure (HCC) 2014    Congestive heart failure, unspecified     DJD (degenerative joint disease)     Erectile dysfunction     Essential hypertension     Hydrocele     Hyperlipidemia 2016    Mitral valve disorders(424.0) 2014 mild MR     Obesity, unspecified 11/10/2014    Syncope and collapse 2014 likely vagal, post 1st dose hytrin 5/15 no recurrence        Family History   Problem Relation Age of Onset    Heart Attack Father 61    Heart Attack Brother 58    Prostate Cancer Other     Stroke Other     Hypertension Other        Social History     Tobacco Use    Smoking status: Former Smoker     Last attempt to quit: 1974     Years since quittin.5    Smokeless tobacco: Never Used   Substance Use Topics    Alcohol use: No    Drug use: No        Current Outpatient Medications   Medication Sig    pravastatin (PRAVACHOL) 20 mg tablet Take 1 Tab by mouth nightly.     candesartan-hydroCHLOROthiazide (ATACAND HCT) 32-25 mg tab per tablet     silodosin (RAPAFLO) 8 mg capsule take 1 capsule by mouth once daily with BREAKFAST    finasteride (PROSCAR) 5 mg tablet take 1 tablet by mouth once daily    metoprolol tartrate (LOPRESSOR) 50 mg tablet take 1 tablet by mouth twice a day    metFORMIN (GLUCOPHAGE) 500 mg tablet Take  by mouth daily (with breakfast).  amLODIPine (NORVASC) 5 mg tablet take 1 tablet by mouth once daily    miscellaneous medical supply (T.E.D. ANTI-EMBOLISM STOCKING) misc 1 Each by Does Not Apply route daily.  montelukast (SINGULAIR) 10 mg tablet Take 10 mg by mouth daily. No current facility-administered medications for this visit. Past Surgical History:   Procedure Laterality Date    HX CIRCUMCISION      HX HERNIA REPAIR Bilateral        Diagnostic Studies:  CARDIOLOGY STUDIES 1/16/2014   Exercise Nuclear Stress Test Result red capacity, nl scan, nl EF   Echocardiogram - Complete Result 55%EF, mild DD, mild MR/dil LA   Some recent data might be hidden       Visit Vitals  /53   Pulse 66   Ht 5' 11\" (1.803 m)   Wt 106.6 kg (235 lb)   BMI 32.78 kg/m²       Mr. Guy Chow has a reminder for a \"due or due soon\" health maintenance. I have asked that he contact his primary care provider for follow-up on this health maintenance. Physical Exam   Constitutional: He is oriented to person, place, and time. He appears well-developed and well-nourished. No distress. HENT:   Head: Normocephalic and atraumatic. Mouth/Throat: Normal dentition. Eyes: Right eye exhibits no discharge. Left eye exhibits no discharge. No scleral icterus. Neck: Neck supple. No JVD present. Carotid bruit is not present. No thyromegaly present. Cardiovascular: Normal rate, regular rhythm, S1 normal, S2 normal, normal heart sounds and intact distal pulses. Exam reveals no gallop and no friction rub. No murmur heard. Pulmonary/Chest: Effort normal and breath sounds normal. He has no wheezes. He has no rales. Abdominal: Soft. He exhibits no mass. There is no tenderness. Musculoskeletal: He exhibits no edema (1+). Lymphadenopathy:        Right cervical: No superficial cervical adenopathy present. Left cervical: No superficial cervical adenopathy present. Neurological: He is alert and oriented to person, place, and time. Skin: Skin is warm and dry. No rash noted. Psychiatric: He has a normal mood and affect. His behavior is normal.       ASSESSMENT and PLAN    Discussed the patient's above normal BMI with him. I have recommended the following interventions: dietary management education, guidance, and counseling . The BMI follow up plan is as follows: BMI is out of normal parameters and plan is as follows: I have counseled this patient on diet and exercise regimens    4/18 [place] Other; use Teds rather than more diuresis due to h/o syncope  Elevated LFT: 12/18 SGOT 42, SGPT 58    Edema is mildly increased compared to last time as is also the weight. Recommended compression stockings rather than diuretics.  recently and given history of diabetes, start statins and follow-up lipids as well as LFTs.    7/2019 - EKG SR with reduced AK interval from prior, no ST changes. Atorvastatin was d/c due to myalgias. Will begin Pravastatin 20 mg and repeat lipids and LFT in 6 weeks. B/P is elevated in office today - he has not taken any medications as of yet today. Reports home b/p [id number] systolic. Advised to keep home b/p chart and   Send to office in 2 weeks. Advised low sodium diet and increase walking for exercise as tolerated. Diagnoses and all orders for this visit:    1. Chronic diastolic heart failure (HCC)  -     AMB POC EKG ROUTINE W/ 12 LEADS, INTER & REP    2. Non-rheumatic mitral regurgitation    3. Essential hypertension, benign  -     pravastatin (PRAVACHOL) 20 mg tablet; Take 1 Tab by mouth nightly. -     METABOLIC PANEL, COMPREHENSIVE; Future  -     LIPID PANEL; Future    4. Pure hypercholesterolemia    5.  Obesity (BMI 30.0-34. 9)        Pertinent laboratory and test data reviewed and discussed with patient. See patient instructions also for other medical advice given    Medications Discontinued During This Encounter   Medication Reason    atorvastatin (LIPITOR) 10 mg tablet        Follow-up and Dispositions    · Return in about 6 months (around 1/22/2020), or if symptoms worsen or fail to improve. BA Lu  I have independently evaluated and examined the patient. Patient is clinically stable with compensated CHF NYHA class II. He was unable to tolerate Lipitor. Will try low-dose Pravachol. If he cannot tolerate that also, will consider Zetia only. Diet weight and exercise was discussed. Blood pressure is high today but is normal at home in the range of 1 28-9 30 systolic per patient. Check the home BP chart. All relevant labs and testing data's are reviewed. Care plan discussed and updated after review.   Shubham Steven MD

## 2019-07-22 NOTE — PROGRESS NOTES
1. Have you been to the ER, urgent care clinic since your last visit? Hospitalized since your last visit?     no  2. Have you seen or consulted any other health care providers outside of the [address] [place] [address] since your last visit? Include any pap smears or colon screening. Yes Where: pcp     3. Since your last visit, have you had any of the following symptoms?      swelling in legs/arms. 4.  Have you had any blood work, X-rays or cardiac testing? No         5. Where do you normally have your labs drawn?  [place]  6. Do you need any refills today?  no

## 2019-08-16 LAB — LDL-C, EXTERNAL: 76

## 2019-10-31 PROBLEM — N28.1 RENAL CYST: Status: ACTIVE | Noted: 2019-10-31

## 2020-01-23 ENCOUNTER — OFFICE VISIT (OUTPATIENT)
Dept: CARDIOLOGY CLINIC | Age: 80
End: 2020-01-23

## 2020-01-23 VITALS
HEIGHT: 71 IN | HEART RATE: 64 BPM | SYSTOLIC BLOOD PRESSURE: 134 MMHG | BODY MASS INDEX: 33.04 KG/M2 | DIASTOLIC BLOOD PRESSURE: 69 MMHG | WEIGHT: 236 LBS

## 2020-01-23 DIAGNOSIS — I50.32 CHRONIC DIASTOLIC HEART FAILURE (HCC): Primary | ICD-10-CM

## 2020-01-23 DIAGNOSIS — I10 ESSENTIAL HYPERTENSION, BENIGN: ICD-10-CM

## 2020-01-23 DIAGNOSIS — I34.0 NON-RHEUMATIC MITRAL REGURGITATION: ICD-10-CM

## 2020-01-23 DIAGNOSIS — E78.00 PURE HYPERCHOLESTEROLEMIA: ICD-10-CM

## 2020-01-23 RX ORDER — PRAVASTATIN SODIUM 20 MG/1
20 TABLET ORAL
Qty: 90 TAB | Refills: 2 | Status: SHIPPED | OUTPATIENT
Start: 2020-01-23 | End: 2021-01-20

## 2020-01-23 RX ORDER — CANDESARTAN CILEXETIL AND HYDROCHLOROTHIAZIDE 32; 25 MG/1; MG/1
1 TABLET ORAL DAILY
COMMUNITY

## 2020-01-23 NOTE — PROGRESS NOTES
HISTORY OF PRESENT ILLNESS  Mateo Matias is a [de-identified] y.o. male. Syncope 3-4 hrs post 1st dose of hytrin in 1/14, gradual fall with mild sweating, urinary incontinence, no vomiting  Similar episode in 2005 approx  No recurrence since not taking hytrin in 1/14 5/16 restarted diovan and d/og lisinopril    7/2019 - Stopped taking Atorvastatin due to myalgias    CHF   The history is provided by the medical records. This is a chronic problem. Associated symptoms include shortness of breath. Pertinent negatives include no chest pain and no headaches. Hypertension   The history is provided by the patient and medical records. Associated symptoms include shortness of breath. Pertinent negatives include no chest pain and no headaches. Cholesterol Problem   The history is provided by the medical records. This is a chronic problem. Associated symptoms include shortness of breath. Pertinent negatives include no chest pain and no headaches. Valvular Heart Disease   The history is provided by the medical records (mr). Associated symptoms include shortness of breath. Pertinent negatives include no chest pain and no headaches. Shortness of Breath   The history is provided by the patient. This is a new problem. The current episode started more than 1 week ago (few yrs, mainly fatigue). The problem has not changed since onset. Associated symptoms include leg swelling. Pertinent negatives include no fever, no headaches, no cough, no wheezing, no PND, no orthopnea, no chest pain, no vomiting, no rash and no claudication. The problem's precipitants include exercise (weed eating, rushing to walk; 11/15 cutting grass, weed eating). Leg Swelling   The history is provided by the patient. This is a chronic problem. The current episode started more than 1 week ago. The problem occurs daily. The problem has not changed since onset. Associated symptoms include shortness of breath.  Pertinent negatives include no chest pain and no headaches. The symptoms are aggravated by standing. The symptoms are relieved by sleep. Review of Systems   Constitutional: Negative for chills, fever, malaise/fatigue and weight loss. HENT: Negative for nosebleeds. Eyes: Negative for discharge. Respiratory: Positive for shortness of breath. Negative for cough and wheezing. Cardiovascular: Positive for leg swelling. Negative for chest pain, palpitations, orthopnea, claudication and PND. Gastrointestinal: Negative for diarrhea, nausea and vomiting. Genitourinary: Negative for dysuria and hematuria. Musculoskeletal: Negative for joint pain. Skin: Negative for rash. Neurological: Negative for dizziness, seizures, loss of consciousness and headaches. Endo/Heme/Allergies: Negative for polydipsia. Does not bruise/bleed easily. Psychiatric/Behavioral: Negative for depression and substance abuse. The patient does not have insomnia.       No Known Allergies    Past Medical History:   Diagnosis Date    Allergic sinusitis     BPH (benign prostatic hyperplasia)     Chronic diastolic heart failure (HCC) 2014    Congestive heart failure, unspecified     DJD (degenerative joint disease)     Erectile dysfunction     Essential hypertension     Hydrocele     Hyperlipidemia 2016    Mitral valve disorders(424.0) 2014 mild MR     Obesity, unspecified 11/10/2014    Syncope and collapse 2014 likely vagal, post 1st dose hytrin 5/15 no recurrence        Family History   Problem Relation Age of Onset    Heart Attack Father 61    Heart Attack Brother 58    Prostate Cancer Other     Stroke Other     Hypertension Other        Social History     Tobacco Use    Smoking status: Former Smoker     Last attempt to quit: 1974     Years since quittin.0    Smokeless tobacco: Never Used   Substance Use Topics    Alcohol use: No    Drug use: No        Current Outpatient Medications   Medication Sig    candesartan-hydroCHLOROthiazide (ATACAND HCT) 32-25 mg tab per tablet Take 1 Tab by mouth daily.  latanoprost (XALATAN) 0.005 % ophthalmic solution     fluticasone propionate (FLONASE) 50 mcg/actuation nasal spray instill 2 sprays into each nostril once daily    finasteride (PROSCAR) 5 mg tablet take 1 tablet by mouth once daily    silodosin (RAPAFLO) 8 mg capsule take 1 capsule by mouth once daily with BREAKFAST    pravastatin (PRAVACHOL) 20 mg tablet Take 1 Tab by mouth nightly.  metoprolol tartrate (LOPRESSOR) 50 mg tablet take 1 tablet by mouth twice a day    metFORMIN (GLUCOPHAGE) 500 mg tablet Take  by mouth daily (with breakfast).  amLODIPine (NORVASC) 5 mg tablet take 1 tablet by mouth once daily    miscellaneous medical supply (T.E.D. ANTI-EMBOLISM STOCKING) misc 1 Each by Does Not Apply route daily.  montelukast (SINGULAIR) 10 mg tablet Take 10 mg by mouth daily. No current facility-administered medications for this visit. Past Surgical History:   Procedure Laterality Date    HX CIRCUMCISION      HX HERNIA REPAIR Bilateral        Diagnostic Studies:  No flowsheet data found. Visit Vitals  /69   Pulse 64   Ht 5' 11\" (1.803 m)   Wt 107 kg (236 lb)   BMI 32.92 kg/m²       Mr. Kyaw Tavares has a reminder for a \"due or due soon\" health maintenance. I have asked that he contact his primary care provider for follow-up on this health maintenance. Physical Exam   Constitutional: He is oriented to person, place, and time. He appears well-developed and well-nourished. No distress. HENT:   Head: Normocephalic and atraumatic. Mouth/Throat: Normal dentition. Eyes: Right eye exhibits no discharge. Left eye exhibits no discharge. No scleral icterus. Neck: Neck supple. No JVD present. Carotid bruit is not present. No thyromegaly present. Cardiovascular: Normal rate, regular rhythm, S1 normal, S2 normal, normal heart sounds and intact distal pulses.  Exam reveals no gallop and no friction rub. No murmur heard. Pulmonary/Chest: Effort normal and breath sounds normal. He has no wheezes. He has no rales. Abdominal: Soft. He exhibits no mass. There is no tenderness. Musculoskeletal:         General: Edema (1+) present. Lymphadenopathy:        Right cervical: No superficial cervical adenopathy present. Left cervical: No superficial cervical adenopathy present. Neurological: He is alert and oriented to person, place, and time. Skin: Skin is warm and dry. No rash noted. Psychiatric: He has a normal mood and affect. His behavior is normal.       ASSESSMENT and PLAN    Discussed the patient's above normal BMI with him. I have recommended the following interventions: dietary management education, guidance, and counseling . The BMI follow up plan is as follows: BMI is out of normal parameters and plan is as follows: I have counseled this patient on diet and exercise regimens    4/18 General Shove; use Teds rather than more diuresis due to h/o syncope  Elevated LFT: 12/18 SGOT 42, SGPT 58    Tolerating pravastatin 20 mg and repeat lipids revealed  HLD: 8/19 , LDL 76, TG 96;    Blood pressure is controlled. CHF is compensated NYHA class II. Lipids are controlled. Continue same medications. Diet exercise and weight discussed. Recommended to reduce salt. Diagnoses and all orders for this visit:    1. Chronic diastolic heart failure ([address]    2. Essential hypertension, benign  -     pravastatin (PRAVACHOL) 20 mg tablet; Take 1 Tab by mouth nightly. 3. Non-rheumatic mitral regurgitation    4. Pure hypercholesterolemia        Pertinent laboratory and test data reviewed and discussed with patient.   See patient instructions also for other medical advice given    Medications Discontinued During This Encounter   Medication Reason    valsartan-hydroCHLOROthiazide (DIOVAN-HCT) 320-25 mg per tablet        Follow-up and Dispositions    · Return in about 6 months (around 7/23/2020), or if symptoms worsen or fail to improve.

## 2020-01-23 NOTE — PATIENT INSTRUCTIONS
Medications Discontinued During This Encounter Medication Reason  valsartan-hydroCHLOROthiazide (DIOVAN-HCT) 320-25 mg per tablet Avoiding Triggers With Heart Failure: Care Instructions Your Care Instructions Triggers are anything that make your heart failure flare up. A flare-up is also called \"sudden heart failure\" or \"acute heart failure. \" When you have a flare-up, fluid builds up in your lungs, and you have problems breathing. You might need to go to the hospital. By watching for changes in your condition and avoiding triggers, you can prevent heart failure flare-ups. Follow-up care is a key part of your treatment and safety. Be sure to make and go to all appointments, and call your doctor if you are having problems. It's also a good idea to know your test results and keep a list of the medicines you take. How can you care for yourself at home? Watch for changes in your weight and condition · Weigh yourself without clothing at the same time each day. Record your weight. Call your doctor if you have sudden weight gain, such as more than 2 to 3 pounds in a day or 5 pounds in a week. (Your doctor may suggest a different range of weight gain.) A sudden weight gain may mean that your heart failure is getting worse. · Keep a daily record of your symptoms. Write down any changes in how you feel, such as new shortness of breath, cough, or problems eating. Also record if your ankles are more swollen than usual and if you feel more tired than usual. Note anything that you ate or did that could have triggered these changes. Limit sodium Sodium causes your body to hold on to extra water. This may cause your heart failure symptoms to get worse. People get most of their sodium from processed foods. Fast food and restaurant meals also tend to be very high in sodium.  
· Your doctor may suggest that you limit sodium to 2,000 milligrams (mg) a day or less. That is less than 1 teaspoon of salt a day, including all the salt you eat in cooking or in packaged foods. · Read food labels on cans and food packages. They tell you how much sodium you get in one serving. Check the serving size. If you eat more than one serving, you are getting more sodium. · Be aware that sodium can come in forms other than salt, including monosodium glutamate (MSG), sodium citrate, and sodium bicarbonate (baking soda). MSG is often added to Asian food. You can sometimes ask for food without MSG or salt. · Slowly reducing salt will help you adjust to the taste. Take the salt shaker off the table. · Flavor your food with garlic, lemon juice, onion, vinegar, herbs, and spices instead of salt. Do not use soy sauce, steak sauce, onion salt, garlic salt, mustard, or ketchup on your food, unless it is labeled \"low-sodium\" or \"low-salt. \" 
· Make your own salad dressings, sauces, and ketchup without adding salt. · Use fresh or frozen ingredients, instead of canned ones, whenever you can. Choose low-sodium canned goods. · Eat less processed food and food from restaurants, including fast food. Exercise as directed Moderate, regular exercise is very good for your heart. It improves your blood flow and helps control your weight. But too much exercise can stress your heart and cause a heart failure flare-up. · Check with your doctor before you start an exercise program. 
· Walking is an easy way to get exercise. Start out slowly. Gradually increase the length and pace of your walk. Swimming, riding a bike, and using a treadmill are also good forms of exercise. · When you exercise, watch for signs that your heart is working too hard. You are pushing yourself too hard if you cannot talk while you are exercising. If you become short of breath or dizzy or have chest pain, stop, sit down, and rest. 
· Do not exercise when you do not feel well. Take medicines correctly · Take your medicines exactly as prescribed. Call your doctor if you think you are having a problem with your medicine. · Make a list of all the medicines you take. Include those prescribed to you by other doctors and any over-the-counter medicines, vitamins, or supplements you take. Take this list with you when you go to any doctor. · Take your medicines at the same time every day. It may help you to post a list of all the medicines you take every day and what time of day you take them. · Make taking your medicine as simple as you can. Plan times to take your medicines when you are doing other things, such as eating a meal or getting ready for bed. This will make it easier to remember to take your medicines. · Get organized. Use helpful tools, such as daily or weekly pill containers. When should you call for help? Call 911 if you have symptoms of sudden heart failure such as: 
  · You have severe trouble breathing.  
  · You cough up pink, foamy mucus.  
  · You have a new irregular or rapid heartbeat.  
 Call your doctor now or seek immediate medical care if: 
  · You have new or increased shortness of breath.  
  · You are dizzy or lightheaded, or you feel like you may faint.  
  · You have sudden weight gain, such as more than 2 to 3 pounds in a day or 5 pounds in a week. (Your doctor may suggest a different range of weight gain.)  
  · You have increased swelling in your legs, ankles, or feet.  
  · You are suddenly so tired or weak that you cannot do your usual activities.  
 Watch closely for changes in your health, and be sure to contact your doctor if you develop new symptoms. Where can you learn more? Go to http://wade-indiana.info/. Enter H872 in the search box to learn more about \"Avoiding Triggers With Heart Failure: Care Instructions. \" Current as of: April 9, 2019 Content Version: 12.2 © 5789-2882 [employer].  Care instructions adapted under license by [address] (which disclaims liability or warranty for this information). If you have questions about a medical condition or this instruction, always ask your healthcare professional. [address] any warranty or liability for your use of this information.

## 2020-01-23 NOTE — PROGRESS NOTES
1. Have you been to the ER, urgent care clinic since your last visit? Hospitalized since your last visit?     no    2. Have you seen or consulted any other health care providers outside of the [address] [place] [address] since your last visit? Include any pap smears or colon screening. Yes Where: pcp     3. Since your last visit, have you had any of the following symptoms?      swelling in legs/arms. 4.  Have you had any blood work, X-rays or cardiac testing? Yes Where: pcp     5. Where do you normally have your labs drawn?   pcp    6. Do you need any refills today?    yes

## 2020-06-01 ENCOUNTER — OFFICE VISIT (OUTPATIENT)
Dept: ORTHOPEDIC SURGERY | Age: 80
End: 2020-06-01

## 2020-06-01 VITALS — WEIGHT: 232.8 LBS | HEIGHT: 71 IN | TEMPERATURE: 96.4 F | BODY MASS INDEX: 32.59 KG/M2

## 2020-06-01 DIAGNOSIS — M79.602 LEFT ARM PAIN: ICD-10-CM

## 2020-06-01 DIAGNOSIS — M54.12 CERVICAL RADICULOPATHY: Primary | ICD-10-CM

## 2020-06-01 DIAGNOSIS — G56.03 BILATERAL CARPAL TUNNEL SYNDROME: ICD-10-CM

## 2020-06-01 NOTE — PROGRESS NOTES
Hand Neighbor is a [de-identified] y.o. male right handed retiree. Worker's Compensation and legal considerations: none filed. Vitals:    06/01/20 0910   Temp: (!) 96.4 °F (35.8 °C)   TempSrc: Oral   Weight: 232 lb 12.8 oz (105.6 kg)   Height: 5' 11\" (1.803 m)   PainSc:   0 - No pain   PainLoc: Hand           Chief Complaint   Patient presents with    Hand Pain     left hand pain         HPI: Patient comes in today with complaints of bilateral hand numbness and tingling. He reports is starting around his shoulder and radiating down especially on the left side. 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 He has not seen anyone for this. He has not been diagnosed with any peripheral mononeuropathies and not been given braces at any point to wear.     Date of onset:  indeterminate    Injury: No    Prior Treatment:  No    Numbness/ Tingling: Yes: Comment: bilateral hands radiatimg from shoulder      ROS: Review of Systems - General ROS: negative  Psychological ROS: negative  ENT ROS: negative  Allergy and Immunology ROS: negative  Hematological and Lymphatic ROS: negative  Respiratory ROS: no cough, shortness of breath, or wheezing  Cardiovascular ROS: no chest pain or dyspnea on exertion  Gastrointestinal ROS: no abdominal pain, change in bowel habits, or black or bloody stools  Musculoskeletal ROS: positive for - pain in shoulder - left  Neurological ROS: positive for - numbness/tingling  Dermatological ROS: negative    Past Medical History:   Diagnosis Date    Allergic sinusitis     BPH (benign prostatic hyperplasia)     Chronic diastolic heart failure (HCC) 2/11/2014    Congestive heart failure, unspecified     DJD (degenerative joint disease)     Erectile dysfunction     Essential hypertension     Hydrocele     Hyperlipidemia 5/2/2016    Mitral valve disorders(424.0) 2/11/2014 1/14 mild MR     Obesity, unspecified 11/10/2014    Syncope and collapse 1/14/2014 1/14 likely vagal, post 1st dose hytrin 5/15 no recurrence        Past Surgical History:   Procedure Laterality Date    HX CIRCUMCISION      HX HERNIA REPAIR Bilateral        Current Outpatient Medications   Medication Sig Dispense Refill    candesartan-hydroCHLOROthiazide (ATACAND HCT) 32-25 mg tab per tablet Take 1 Tab by mouth daily.  pravastatin (PRAVACHOL) 20 mg tablet Take 1 Tab by mouth nightly. 90 Tab 2    finasteride (PROSCAR) 5 mg tablet take 1 tablet by mouth once daily 90 Tab 3    silodosin (RAPAFLO) 8 mg capsule take 1 capsule by mouth once daily with BREAKFAST 90 Cap 3    metoprolol tartrate (LOPRESSOR) 50 mg tablet take 1 tablet by mouth twice a day 60 Tab 6    metFORMIN (GLUCOPHAGE) 500 mg tablet Take  by mouth daily (with breakfast).  amLODIPine (NORVASC) 5 mg tablet take 1 tablet by mouth once daily 30 Tab 3    montelukast (SINGULAIR) 10 mg tablet Take 10 mg by mouth daily.  latanoprost (XALATAN) 0.005 % ophthalmic solution   0    fluticasone propionate (FLONASE) 50 mcg/actuation nasal spray instill 2 sprays into each nostril once daily  0    miscellaneous medical supply (T.E.D. ANTI-EMBOLISM STOCKING) misc 1 Each by Does Not Apply route daily. 2 Each 0       No Known Allergies        PE:     Physical Exam  Vitals signs and nursing note reviewed. Constitutional:       Appearance: Normal appearance. HENT:      Head: Normocephalic and atraumatic. Nose: Nose normal.      Mouth/Throat:      Mouth: Mucous membranes are moist.   Eyes:      Extraocular Movements: Extraocular movements intact. Pupils: Pupils are equal, round, and reactive to light. Neck:      Musculoskeletal: Normal range of motion. No neck rigidity. Cardiovascular:      Pulses: Normal pulses. Pulmonary:      Effort: Pulmonary effort is normal. No respiratory distress. Abdominal:      General: Abdomen is flat. There is no distension. Musculoskeletal: Normal range of motion.          General: No swelling, tenderness, deformity or signs of injury. Right lower leg: No edema. Left lower leg: No edema. Skin:     General: Skin is warm and dry. Capillary Refill: Capillary refill takes less than 2 seconds. Coloration: Skin is not jaundiced or pale. Findings: No bruising, erythema, lesion or rash. Neurological:      General: No focal deficit present. Mental Status: He is alert and oriented to person, place, and time. Cranial Nerves: No cranial nerve deficit. Sensory: Sensory deficit present. Motor: No weakness. Coordination: Coordination normal.      Gait: Gait normal.      Deep Tendon Reflexes: Reflexes normal.   Psychiatric:         Mood and Affect: Mood normal.         Behavior: Behavior normal.         Thought Content: Thought content normal.         Judgment: Judgment normal.            NEUROVASCULAR    Examination L R Examination L R   Carpal Comp. + + Pronator Comp. - -   Carpal Tinel + + Pronator Tinel - -   Phalen's + + Pronator Stress - -   Cubital Comp. - - Guyon Comp. - -   Cubital Tinel - - Guyon Tinel - -   Elbow Hyperflexion - - Adson's - -   Spurling's ? ? SC Comp. - -   PCB Median abn - - SC Tinel - -   Radial Tinel - - IC Comp. - -   Digital Tinel - - IC Tinel - -   Radial 2-Pt WNL WNL Ulnar 2-Pt WNL WNL     Radial Pulse: 2+  Capillary Refill: < 2 sec  Osmany: Not Performed  Digital Osmany: Not Performed        Imaging:     Plain films dated 6/1/2020 of the cervical spine shows degenerative changes throughout most levels. There is no acute fracture or dislocation noted. ICD-10-CM ICD-9-CM    1. Cervical radiculopathy M54.12 723.4 EMG TWO EXTREMITIES UPPER      NCV/LAT MOTOR PER NERVE UP/RT      NCV/LAT MOTOR PER NERVE UP/LT   2. Bilateral carpal tunnel syndrome G56.03 354.0 AMB SUPPLY ORDER      EMG TWO EXTREMITIES UPPER      NCV/LAT MOTOR PER NERVE UP/RT      NCV/LAT MOTOR PER NERVE UP/LT   3.  Left arm pain M79.602 729.5 AMB POC XRAY, SPINE, CERVICAL; 2 OR 3         Plan: Bilateral upper extremity EMG and nerve conduction studies    Bilateral carpal tunnel braces    Follow-up and Dispositions    · Return for EMG F/U.           Plan was reviewed with patient, who verbalized agreement and understanding of the plan

## 2020-06-18 LAB — LDL-C, EXTERNAL: 71

## 2020-06-19 ENCOUNTER — OFFICE VISIT (OUTPATIENT)
Dept: ORTHOPEDIC SURGERY | Age: 80
End: 2020-06-19

## 2020-06-19 VITALS
DIASTOLIC BLOOD PRESSURE: 81 MMHG | WEIGHT: 232 LBS | BODY MASS INDEX: 32.48 KG/M2 | SYSTOLIC BLOOD PRESSURE: 163 MMHG | HEIGHT: 71 IN | HEART RATE: 64 BPM

## 2020-06-19 DIAGNOSIS — R94.131 ABNORMAL EMG: ICD-10-CM

## 2020-06-19 DIAGNOSIS — R20.2 NUMBNESS AND TINGLING IN BOTH HANDS: Primary | ICD-10-CM

## 2020-06-19 DIAGNOSIS — R20.0 NUMBNESS AND TINGLING IN BOTH HANDS: Primary | ICD-10-CM

## 2020-06-19 NOTE — PROGRESS NOTES
[address]  [address], [address]  [address]  Phone: [phone]  Fax: [phone]        Wilbern Favre  : 1940  PCP: Lorena Vincent MD  2020    ELECTROMYOGRAPHY AND NERVE CONDUCTION STUDIES    Zuleika Roberts was referred by Dr. Marcie Grace for electrodiagnostic evaluation of bilateral hand numbness (L>R). NCV & EMG Findings:  Evaluation of the left median motor and the right median motor nerves showed decreased conduction velocity (Elbow-Wrist, L47, R47 m/s). The left ulnar motor and the right ulnar motor nerves showed decreased conduction velocity (A Elbow-B Elbow, L38, R42 m/s). The left median sensory and the right median sensory nerves showed prolonged distal peak latency (L3.8, R3.7 ms) and decreased conduction velocity (Wrist-2nd Digit, L37, R38 m/s). The left ulnar sensory and the right ulnar sensory nerves showed prolonged distal peak latency (L3.9, R3.8 ms), reduced amplitude (L7.3, R8.7 µV), and decreased conduction velocity (Wrist-5th Digit, L36, R37 m/s). All remaining nerves (as indicated in the following tables) were within normal limits. Left vs. Right side comparison data for the ulnar motor nerve indicates abnormal L-R latency difference (0.8 ms). All remaining left vs. right side differences were within normal limits. All examined muscles (as indicated in the following table) showed no evidence of electrical instability. INTERPRETATION  This is an abnormal electrodiagnostic examination. These findings may be consistent with:  1. 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 Sensory peripheral neuropathy -  This is based on diffuse slowing of the sensory nerves throughout his NCS    There are no electrodiagnostic findings consistent with cervical radiculopathy. CLINICAL INTERPRETATION  His electrodiagnostic findings may explain his bilateral hand symptoms. His left shoulder pain localizes to the teres minor on physical.         HISTORY OF PRESENT ILLNESS  Rachel Caldwell is a [de-identified] y.o. male. Pt presents today for BUE EMG evaluation of bilateral hand numbness (L>R) into all digits x 1 year, progressive. His symptoms are worse at night when he is in bed. He has found some benefit from wearing wrist braces at night. He also c/o some left shoulder pain. Pt notes that he has had neck arthritis for over 40 years. PmHx: DM    PAST MEDICAL HISTORY   Past Medical History:   Diagnosis Date    Allergic sinusitis     BPH (benign prostatic hyperplasia)     Chronic diastolic heart failure ([facility] [address] 2/11/2014    Congestive heart failure, unspecified     DJD (degenerative joint disease)     Erectile dysfunction     Essential hypertension     Hydrocele     Hyperlipidemia 5/2/2016    Mitral valve disorders(424.0) 2/11/2014 1/14 mild MR     Obesity, unspecified 11/10/2014    Syncope and collapse 1/14/2014 1/14 likely vagal, post 1st dose hytrin 5/15 no recurrence        Past Surgical History:   Procedure Laterality Date    HX CIRCUMCISION      HX HERNIA REPAIR Bilateral    .      MEDICATIONS    Current Outpatient Medications   Medication Sig Dispense Refill    candesartan-hydroCHLOROthiazide (ATACAND HCT) 32-25 mg tab per tablet Take 1 Tab by mouth daily.  pravastatin (PRAVACHOL) 20 mg tablet Take 1 Tab by mouth nightly. 90 Tab 2    latanoprost (XALATAN) 0.005 % ophthalmic solution   0    fluticasone propionate (FLONASE) 50 mcg/actuation nasal spray instill 2 sprays into each nostril once daily  0    finasteride (PROSCAR) 5 mg tablet take 1 tablet by mouth once daily 90 Tab 3    silodosin (RAPAFLO) 8 mg capsule take 1 capsule by mouth once daily with BREAKFAST 90 Cap 3    metoprolol tartrate (LOPRESSOR) 50 mg tablet take 1 tablet by mouth twice a day 60 Tab 6    metFORMIN (GLUCOPHAGE) 500 mg tablet Take  by mouth daily (with breakfast).       amLODIPine (NORVASC) 5 mg tablet take 1 tablet by mouth once daily 30 Tab 3    miscellaneous medical supply (T.E.D. ANTI-EMBOLISM STOCKING) misc 1 Each by Does Not Apply route daily. 2 Each 0    montelukast (SINGULAIR) 10 mg tablet Take 10 mg by mouth daily. ALLERGIES  No Known Allergies       SOCIAL HISTORY    Social History     Socioeconomic History    Marital status:      Spouse name: Not on file    Number of children: Not on file    Years of education: Not on file    Highest education level: Not on file   Tobacco Use    Smoking status: Former Smoker     Last attempt to quit: 1974     Years since quittin.4    Smokeless tobacco: Never Used   Substance and Sexual Activity    Alcohol use: No    Drug use: No       FAMILY HISTORY  Family History   Problem Relation Age of Onset    Heart Attack Father 61    Heart Attack Brother 58    Prostate Cancer Other     Stroke Other     Hypertension Other          PHYSICAL EXAMINATION  Visit Vitals  /81   Pulse 64   Ht 5' 11\" (1.803 m)   Wt 232 lb (105.2 kg)   BMI 32.36 kg/m²       Pain Assessment  2020   Location of Pain Hand   Location Modifiers Left   Severity of Pain 0   Quality of Pain (No Data)   Quality of Pain Comment pins and needles           Constitutional:  Well developed, well nourished, in no acute distress. Psychiatric: Affect and mood are appropriate. Integumentary: No rashes or abrasions noted on exposed areas. SPINE/MUSCULOSKELETAL EXAM    On brief examination: Pain with activation of teres minor on the left.       NCV & EMG Findings:  Nerve Conduction Studies  Anti Sensory Summary Table     Stim Site NR Peak (ms) Norm Peak (ms) O-P Amp (µV) Norm O-P Amp Site1 Site2 Delta-P (ms) Dist (cm) Robin (m/s) Norm Robin (m/s)   Left Median Anti Sensory (2nd Digit)   Wrist    3.8 <3.6 11.1 >10 Wrist 2nd Digit 3.8 14.0 37 >39   Right Median Anti Sensory (2nd Digit)   Wrist    3.7 <3.6 10.5 >10 Wrist 2nd Digit 3.7 14.0 38 >39 Left Radial Anti Sensory (Base 1st Digit)   Wrist    2.4 <3.1 39.6  Wrist Base 1st Digit 2.4 0.0     Right Radial Anti Sensory (Base 1st Digit)   Wrist    2.5 <3.1 18.0  Wrist Base 1st Digit 2.5 0.0     Left Ulnar Anti Sensory (5th Digit)   Wrist    3.9 <3.7 7.3 >15.0 Wrist 5th Digit 3.9 14.0 36 >38   Right Ulnar Anti Sensory (5th Digit)   Wrist    3.8 <3.7 8.7 >15.0 Wrist 5th Digit 3.8 14.0 37 >38     Motor Summary Table     Stim Site NR Onset (ms) Norm Onset (ms) O-P Amp (mV) Norm O-P Amp Site1 Site2 Delta-0 (ms) Dist (cm) Robin (m/s) Norm Robin (m/s)   Left Median Motor (Abd Poll Brev)   Wrist    3.8 <4.2 5.8 >5 Elbow Wrist 5.2 24.5 47 >50   Elbow    9.0  5.5          Right Median Motor (Abd Poll Brev)   Wrist    4.0 <4.2 5.5 >5 Elbow Wrist 5.1 24.0 47 >50   Elbow    9.1  4.0          Left Ulnar Motor (Abd Dig Min)   Wrist    3.8 <4.2 7.0 >3 B Elbow Wrist 4.0 22.0 55 >53   B Elbow    7.8  6.4  A Elbow B Elbow 2.6 10.0 38 >53   A Elbow    10.4  6.5          Right Ulnar Motor (Abd Dig Min)   Wrist    3.0 <4.2 7.4 >3 B Elbow Wrist 4.0 21.5 54 >53   B Elbow    7.0  6.3  A Elbow B Elbow 2.4 10.0 42 >53   A Elbow    9.4  5.6            EMG     Side Muscle Nerve Root Ins Act Fibs Psw Amp Dur Poly Recrt Int Pat Comment   Left Biceps Musculocut C5-6 Nml Nml Nml Nml Nml 0 Nml Nml    Left Triceps Radial C6-7-8 Nml Nml Nml Nml Nml 0 Nml Nml    Left PronatorTeres Median C6-7 Nml Nml Nml Nml Nml 0 Nml Nml    Left Abd Poll Brev Median C8-T1 Nml Nml Nml Nml Nml 0 Nml Nml    Left 1stDorInt Ulnar C8-T1 Nml Nml Nml Nml Nml 0 Nml Nml    Left FlexCarpiUln Ulnar C8,T1 Nml Nml Nml Nml Nml 0 Nml Nml    Right Deltoid Axillary C5-6 Nml Nml Nml Nml Nml 0 Nml Nml    Right Biceps Musculocut C5-6 Nml Nml Nml Nml Nml 0 Nml Nml    Right Triceps Radial C6-7-8 Nml Nml Nml Nml Nml 0 Nml Nml    Right PronatorTeres Median C6-7 Nml Nml Nml Nml Nml 0 Nml Nml    Right Ext Digitorum Radial (Post Int) C7-8 Nml Nml Nml Nml Nml 0 Nml Nml    Right Abd [place] Brev Median C8-T1 Nml Nml Nml Nml Nml 0 Nml Nml    Right Abd Dig Min Ulnar C8-T1 Nml Nml Nml Nml Nml 0 Nml Nml    Right 1stDorInt Ulnar C8-T1 Nml Nml Nml Nml Nml 0 Nml Nml        Nerve Conduction Studies  Anti Sensory Left/Right Comparison     Stim Site L Lat (ms) R Lat (ms) L-R Lat (ms) L Amp (µV) R Amp (µV) L-R Amp (%) Site1 Site2 L Robin (m/s) R Robin (m/s) L-R Robin (m/s)   Median Anti Sensory (2nd Digit)   Wrist 3.8 3.7 0.1 11.1 10.5 5.4 Wrist 2nd Digit 37 38 1   Radial Anti Sensory (Base 1st Digit)   Wrist 2.4 2.5 0.1 39.6 18.0 54.5 Wrist Base 1st Digit      Ulnar Anti Sensory (5th Digit)   Wrist 3.9 3.8 0.1 7.3 8.7 16.1 Wrist 5th Digit 36 37 1     Motor Left/Right Comparison     Stim Site L Lat (ms) R Lat (ms) L-R Lat (ms) L Amp (mV) R Amp (mV) L-R Amp (%) Site1 Site2 L Robin (m/s) R Robin (m/s) L-R Robin (m/s)   Median Motor (Abd Poll Brev)   Wrist 3.8 4.0 0.2 5.8 5.5 5.2 Elbow Wrist 47 47 0   Elbow 9.0 9.1 0.1 5.5 4.0 27.3        Ulnar Motor (Abd Dig Min)   Wrist 3.8 3.0 0.8 7.0 7.4 5.4 B Elbow Wrist 55 54 1   B Elbow 7.8 7.0 0.8 6.4 6.3 1.6 A Elbow B Elbow 38 42 4   A Elbow 10.4 9.4 1.0 6.5 5.6 13.8              Waveforms:                                  [facility]  OFFICE PROCEDURE PROGRESS NOTE        Chart reviewed for the following:   Bart RENTERIA, have reviewed the History, Physical and updated the Allergic reactions for [address] performed immediately prior to start of procedure:   Bart RENTERIA, have performed the following reviews on Henri Can prior to the start of the procedure:            * Patient was identified by name and date of birth   * Agreement on procedure being performed was verified  * Risks and Benefits explained to the patient  * Procedure site verified and marked as necessary  * Patient was positioned for comfort  * Consent was signed and verified     Time: 2:34 PM        Date of procedure: 6/19/2020    Procedure performed by:  Gabriela Kennedy MD    Provider accompanied by: Dolores. Patient accompanied by: Self.     How tolerated by patient: tolerated the procedure well with no complications    Post Procedural Pain Scale: 0 - No Hurt    Comments: none    Written by Andrew Montero, [address] as dictated by Author MD Marcos

## 2020-06-25 ENCOUNTER — OFFICE VISIT (OUTPATIENT)
Dept: ORTHOPEDIC SURGERY | Age: 80
End: 2020-06-25

## 2020-06-25 VITALS
WEIGHT: 232.6 LBS | HEIGHT: 71 IN | BODY MASS INDEX: 32.56 KG/M2 | HEART RATE: 66 BPM | RESPIRATION RATE: 15 BRPM | TEMPERATURE: 96.9 F | OXYGEN SATURATION: 96 % | DIASTOLIC BLOOD PRESSURE: 64 MMHG | SYSTOLIC BLOOD PRESSURE: 136 MMHG

## 2020-06-25 DIAGNOSIS — G56.21 CUBITAL TUNNEL SYNDROME, RIGHT: ICD-10-CM

## 2020-06-25 DIAGNOSIS — Z01.818 PREOP EXAMINATION: Primary | ICD-10-CM

## 2020-06-25 DIAGNOSIS — G56.22 CUBITAL TUNNEL SYNDROME, LEFT: Primary | ICD-10-CM

## 2020-06-25 NOTE — LETTER
[facility]    Surgery [facility] Form [facility]  Fax to [phone]  Telephone: [phone] or [phone]    To be completed by Physician Office:    Date: 2020    Requested by: Izabella Owen    Phone No: [phone]  Fax No:  [phone]    Surgery Date: 2020   Requested Time: Third Case          Surgeon: Flaquito Wheatley DO.      Assistant/2nd Surgeon:      CPT CODE  Procedure   99302 Left Cubital Tunnel Release              Diagnosis:  Left Cubital Tunnel Syndrome G56.22    Patient Information:    Name: Kym Odell    SSN: [id number]  : 1940   Male/Female: male    Home Phone No: [phone] (home)     Primary Insurance: [facility] Medicare Replacement   Insurance Policy Number: [id number]     Allergies: No Known Allergies    Admission: outpatient    Anesthesia Type:  General    Comments/Special Equipment and/or :  Hand Table, Sterile Tourniquet 

## 2020-06-25 NOTE — PROGRESS NOTES
Last Suggs is a [de-identified] y.o. male right handed retiree. Worker's Compensation and legal considerations: none filed. Vitals:    06/25/20 1102   BP: 136/64   Pulse: 66   Resp: 15   Temp: 96.9 °F (36.1 °C)   TempSrc: Oral   SpO2: 96%   Weight: 232 lb 9.6 oz (105.5 kg)   Height: 5' 11\" (1.803 m)   PainSc:   0 - No pain   PainLoc: Hand           Chief Complaint   Patient presents with    Hand Pain     jaison hand pain       HPI:  Jaison UE EMG F/U. Pain and numbness on left side but none on right. Initial HPI: Patient comes in today with complaints of bilateral hand numbness and tingling. He reports is starting around his shoulder and radiating down especially on the left side. 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 He has not seen anyone for this. He has not been diagnosed with any peripheral mononeuropathies and not been given braces at any point to wear.     Date of onset:  indeterminate    Injury: No    Prior Treatment:  No    Numbness/ Tingling: Yes: Comment: bilateral hands radiatimg from shoulder      ROS: Review of Systems - General ROS: negative  Psychological ROS: negative  ENT ROS: negative  Allergy and Immunology ROS: negative  Hematological and Lymphatic ROS: negative  Respiratory ROS: no cough, shortness of breath, or wheezing  Cardiovascular ROS: no chest pain or dyspnea on exertion  Gastrointestinal ROS: no abdominal pain, change in bowel habits, or black or bloody stools  Musculoskeletal ROS: positive for - pain in shoulder - left  Neurological ROS: positive for - numbness/tingling  Dermatological ROS: negative    Past Medical History:   Diagnosis Date    Allergic sinusitis     BPH (benign prostatic hyperplasia)     Chronic diastolic heart failure (HCC) 2/11/2014    Congestive heart failure, unspecified     DJD (degenerative joint disease)     Erectile dysfunction     Essential hypertension     Hydrocele     Hyperlipidemia 5/2/2016    Mitral valve disorders(424.0) 2/11/2014 1/14 mild MR     Obesity, unspecified 11/10/2014    Syncope and collapse 1/14/2014 1/14 likely vagal, post 1st dose hytrin 5/15 no recurrence        Past Surgical History:   Procedure Laterality Date    HX CIRCUMCISION      HX HERNIA REPAIR Bilateral        Current Outpatient Medications   Medication Sig Dispense Refill    candesartan-hydroCHLOROthiazide (ATACAND HCT) 32-25 mg tab per tablet Take 1 Tab by mouth daily.  pravastatin (PRAVACHOL) 20 mg tablet Take 1 Tab by mouth nightly. 90 Tab 2    latanoprost (XALATAN) 0.005 % ophthalmic solution   0    fluticasone propionate (FLONASE) 50 mcg/actuation nasal spray instill 2 sprays into each nostril once daily  0    finasteride (PROSCAR) 5 mg tablet take 1 tablet by mouth once daily 90 Tab 3    silodosin (RAPAFLO) 8 mg capsule take 1 capsule by mouth once daily with BREAKFAST 90 Cap 3    metoprolol tartrate (LOPRESSOR) 50 mg tablet take 1 tablet by mouth twice a day 60 Tab 6    metFORMIN (GLUCOPHAGE) 500 mg tablet Take  by mouth daily (with breakfast).  amLODIPine (NORVASC) 5 mg tablet take 1 tablet by mouth once daily 30 Tab 3    miscellaneous medical supply (T.E.D. ANTI-EMBOLISM STOCKING) misc 1 Each by Does Not Apply route daily. 2 Each 0    montelukast (SINGULAIR) 10 mg tablet Take 10 mg by mouth daily. No Known Allergies        PE:     Physical Exam  Vitals signs and nursing note reviewed. Constitutional:       Appearance: Normal appearance. HENT:      Head: Normocephalic and atraumatic. Nose: Nose normal.      Mouth/Throat:      Mouth: Mucous membranes are moist.   Eyes:      Extraocular Movements: Extraocular movements intact. Pupils: Pupils are equal, round, and reactive to light. Neck:      Musculoskeletal: Normal range of motion. No neck rigidity. Cardiovascular:      Pulses: Normal pulses. Pulmonary:      Effort: Pulmonary effort is normal. No respiratory distress. Abdominal:      General: Abdomen is flat.  There is no distension. Musculoskeletal: Normal range of motion. General: No swelling, tenderness, deformity or signs of injury. Right lower leg: No edema. Left lower leg: No edema. Skin:     General: Skin is warm and dry. Capillary Refill: Capillary refill takes less than 2 seconds. Coloration: Skin is not jaundiced or pale. Findings: No bruising, erythema, lesion or rash. Neurological:      General: No focal deficit present. Mental Status: He is alert and oriented to person, place, and time. Cranial Nerves: No cranial nerve deficit. Sensory: Sensory deficit present. Motor: No weakness. Coordination: Coordination normal.      Gait: Gait normal.      Deep Tendon Reflexes: Reflexes normal.   Psychiatric:         Mood and Affect: Mood normal.         Behavior: Behavior normal.         Thought Content: Thought content normal.         Judgment: Judgment normal.            NEUROVASCULAR    Examination L R Examination L R   Carpal Comp. - - Pronator Comp. - -   Carpal Tinel - - Pronator Tinel - -   Phalen's - - Pronator Stress - -   Cubital Comp. - - Guyon Comp. - -   Cubital Tinel + + Guyon Tinel - -   Elbow Hyperflexion - - Adson's - -   Spurling's ? ? SC Comp. - -   PCB Median abn - - SC Tinel - -   Radial Tinel - - IC Comp. - -   Digital Tinel - - IC Tinel - -   Radial 2-Pt WNL WNL Ulnar 2-Pt WNL WNL     Radial Pulse: 2+  Capillary Refill: < 2 sec  [place]: Not Performed  [employer]: Not Performed    NCV & EMG Findings:  Evaluation of the left median motor and the right median motor nerves showed decreased conduction velocity (Elbow-Wrist, L47, R47 m/s). The left ulnar motor and the right ulnar motor nerves showed decreased conduction velocity (A Elbow-B Elbow, L38, R42 m/s). The left median sensory and the right median sensory nerves showed prolonged distal peak latency (L3.8, R3.7 ms) and decreased conduction velocity (Wrist-2nd Digit, L37, R38 m/s).   The left ulnar sensory and the right ulnar sensory nerves showed prolonged distal peak latency (L3.9, R3.8 ms), reduced amplitude (L7.3, R8.7 µV), and decreased conduction velocity (Wrist-5th Digit, L36, R37 m/s). All remaining nerves (as indicated in the following tables) were within normal limits. Left vs. Right side comparison data for the ulnar motor nerve indicates abnormal L-R latency difference (0.8 ms). All remaining left vs. right side differences were within normal limits.       All examined muscles (as indicated in the following table) showed no evidence of electrical instability.       INTERPRETATION  This is an abnormal electrodiagnostic examination. These findings may be consistent with:  1. 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 Sensory peripheral neuropathy -  This is based on diffuse slowing of the sensory nerves throughout his NCS     There are no electrodiagnostic findings consistent with cervical radiculopathy.        CLINICAL INTERPRETATION  His electrodiagnostic findings may explain his bilateral hand symptoms. His left shoulder pain localizes to the teres minor on physical.     Imaging:     Plain films dated 6/1/2020 of the cervical spine shows degenerative changes throughout most levels. There is no acute fracture or dislocation noted. ICD-10-CM ICD-9-CM    1. Cubital tunnel syndrome, left G56.22 354.2 SCHEDULE SURGERY   2. Cubital tunnel syndrome, right G56.21 354.2          Plan:     Schedule Surgery for Left with labs and clearance    The patient was counseled at length about the risks of elizabeth Covid-19 during their perioperative period and any recovery window from their procedure. The patient was made aware that elizabeth Covid-19  may worsen their prognosis for recovering from their procedure and lend to a higher morbidity and/or mortality risk.   All material risks, benefits, and reasonable alternatives including postponing the procedure were discussed. The patient does  wish to proceed with the procedure at this time. This procedure has been fully reviewed with the patient and written informed consent has been obtained. Follow-up and Dispositions    · Return for postop wound check.           Plan was reviewed with patient, who verbalized agreement and understanding of the plan

## 2020-06-25 NOTE — LETTER
Patient: Elkin Cunningham                        PROCEDURE: Left Cubital Tunnel Release    PRE OPERATIVE INSTRUCTIONS:  Five (5) days prior to surgery STOP taking any hormonal medications, aspirin and/or anti-inflammatory medications. If you are taking blood thinner medication (such as Coumadin, Plavix, Heparin or others) you will need special instructions from the prescribing physician. LAB: Report to [facility] on 7/13/2020 between 2-4pm for bloodwork, EKG and COVID testing. (Your orders for these tests are in [address] [place] [address] at the [facility].)     o It doesn't matter if you have eaten before going to the Lab. o If required labs and EKG are not completed Surgery will be canceled. MEDICAL CLEARANCE appointment will be scheduled with Dr. Kristine Agrawal. Surgery Date: 7/20/2020  Time: 1:45pm  Report to [facility] on the First Floor Admitting at: 11:45am    THE DAY OF SURGERY:         1. Do not eat, chew gum or drink anything after Midnight prior to the date of your surgery. 2. Take your regular medications with small sips of water unless otherwise instructed. (This means blood pressure and/or heart medicine) If you are insulin dependent, bring your insulin with you, unless otherwise instructed. 3. Bring a list of your medications and the dosage to the hospital including  vitamins. 4. Do not wear nail polish, make-up, jewelry, perfumes or skin creams. 5. Do not bring valuables or money to the hospital.        6. You MUST have a responsible adult accompany you, stay during your surgery and drive you home following surgery. It is recommended that they stay with you 24 hours after surgery. Post op visit  appointment is scheduled with Dr. Katie Diaz       on 8/3/2020 @ 9:45am at the [facility] office.       Esha Dawson, Surgery Scheduler  [phone]

## 2020-07-09 ENCOUNTER — OFFICE VISIT (OUTPATIENT)
Dept: CARDIOLOGY CLINIC | Age: 80
End: 2020-07-09

## 2020-07-09 VITALS
SYSTOLIC BLOOD PRESSURE: 135 MMHG | BODY MASS INDEX: 32.62 KG/M2 | WEIGHT: 233 LBS | DIASTOLIC BLOOD PRESSURE: 71 MMHG | HEART RATE: 63 BPM | HEIGHT: 71 IN

## 2020-07-09 DIAGNOSIS — E66.9 OBESITY (BMI 30.0-34.9): ICD-10-CM

## 2020-07-09 DIAGNOSIS — I34.0 NON-RHEUMATIC MITRAL REGURGITATION: ICD-10-CM

## 2020-07-09 DIAGNOSIS — I10 ESSENTIAL HYPERTENSION, BENIGN: ICD-10-CM

## 2020-07-09 DIAGNOSIS — I50.32 CHRONIC DIASTOLIC HEART FAILURE (HCC): Primary | ICD-10-CM

## 2020-07-09 DIAGNOSIS — E78.00 PURE HYPERCHOLESTEROLEMIA: ICD-10-CM

## 2020-07-09 RX ORDER — VALSARTAN AND HYDROCHLOROTHIAZIDE 320; 25 MG/1; MG/1
1 TABLET, FILM COATED ORAL DAILY
COMMUNITY
End: 2020-07-09 | Stop reason: ALTCHOICE

## 2020-07-09 RX ORDER — ACETAMINOPHEN 500 MG
TABLET ORAL
COMMUNITY
End: 2020-07-20

## 2020-07-09 NOTE — PROGRESS NOTES
HISTORY OF PRESENT ILLNESS  Reyes Marks is a [de-identified] y.o. male. Syncope 3-4 hrs post 1st dose of hytrin in 1/14, gradual fall with mild sweating, urinary incontinence, no vomiting  Similar episode in 2005 approx  No recurrence since not taking hytrin in 1/14 5/16 restarted diovan and d/og lisinopril    7/2019 - Stopped taking Atorvastatin due to myalgias    CHF   The history is provided by the medical records. This is a chronic problem. Associated symptoms include shortness of breath. Pertinent negatives include no chest pain and no headaches. Cholesterol Problem   The history is provided by the medical records. This is a chronic problem. Associated symptoms include shortness of breath. Pertinent negatives include no chest pain and no headaches. Hypertension   The history is provided by the patient and medical records. Associated symptoms include shortness of breath. Pertinent negatives include no chest pain and no headaches. Valvular Heart Disease   The history is provided by the medical records (mr). Associated symptoms include shortness of breath. Pertinent negatives include no chest pain and no headaches. Shortness of Breath   The history is provided by the patient. This is a new problem. The current episode started more than 1 week ago (few yrs, mainly fatigue). The problem has not changed since onset. Associated symptoms include leg swelling. Pertinent negatives include no fever, no headaches, no cough, no wheezing, no PND, no orthopnea, no chest pain, no vomiting, no rash and no claudication. The problem's precipitants include exercise (weed eating, rushing to walk; 11/15 cutting grass, weed eating). Leg Swelling   The history is provided by the patient. This is a chronic problem. The current episode started more than 1 week ago. The problem occurs daily. The problem has not changed since onset. Associated symptoms include shortness of breath.  Pertinent negatives include no chest pain and no headaches. The symptoms are aggravated by standing. The symptoms are relieved by sleep. Review of Systems   Constitutional: Negative for chills, fever, malaise/fatigue and weight loss. HENT: Negative for nosebleeds. Eyes: Negative for discharge. Respiratory: Positive for shortness of breath. Negative for cough and wheezing. Cardiovascular: Positive for leg swelling. Negative for chest pain, palpitations, orthopnea, claudication and PND. Gastrointestinal: Negative for diarrhea, nausea and vomiting. Genitourinary: Negative for dysuria and hematuria. Musculoskeletal: Negative for joint pain. Skin: Negative for rash. Neurological: Negative for dizziness, seizures, loss of consciousness and headaches. Endo/Heme/Allergies: Negative for polydipsia. Does not bruise/bleed easily. Psychiatric/Behavioral: Negative for depression and substance abuse. The patient does not have insomnia.       No Known Allergies    Past Medical History:   Diagnosis Date    Allergic sinusitis     BPH (benign prostatic hyperplasia)     Chronic diastolic heart failure (HCC) 2014    Congestive heart failure, unspecified     DJD (degenerative joint disease)     Erectile dysfunction     Essential hypertension     Hydrocele     Hyperlipidemia 2016    Mitral valve disorders(424.0) 2014 mild MR     Obesity, unspecified 11/10/2014    Syncope and collapse 2014 likely vagal, post 1st dose hytrin 5/15 no recurrence        Family History   Problem Relation Age of Onset    Heart Attack Father 61    Heart Attack Brother 58    Prostate Cancer Other     Stroke Other     Hypertension Other        Social History     Tobacco Use    Smoking status: Former Smoker     Last attempt to quit: 1974     Years since quittin.5    Smokeless tobacco: Never Used   Substance Use Topics    Alcohol use: No    Drug use: No        Current Outpatient Medications   Medication Sig    acetaminophen (TYLENOL) 500 mg tablet Take  by mouth every six (6) hours as needed for Pain.  candesartan-hydroCHLOROthiazide (ATACAND HCT) 32-25 mg tab per tablet Take 1 Tab by mouth daily.  pravastatin (PRAVACHOL) 20 mg tablet Take 1 Tab by mouth nightly.  latanoprost (XALATAN) 0.005 % ophthalmic solution     fluticasone propionate (FLONASE) 50 mcg/actuation nasal spray instill 2 sprays into each nostril once daily    finasteride (PROSCAR) 5 mg tablet take 1 tablet by mouth once daily    silodosin (RAPAFLO) 8 mg capsule take 1 capsule by mouth once daily with BREAKFAST    metoprolol tartrate (LOPRESSOR) 50 mg tablet take 1 tablet by mouth twice a day    metFORMIN (GLUCOPHAGE) 500 mg tablet Take  by mouth daily (with breakfast).  amLODIPine (NORVASC) 5 mg tablet take 1 tablet by mouth once daily    miscellaneous medical supply (T.E.D. ANTI-EMBOLISM STOCKING) misc 1 Each by Does Not Apply route daily.  montelukast (SINGULAIR) 10 mg tablet Take 10 mg by mouth daily. No current facility-administered medications for this visit. Past Surgical History:   Procedure Laterality Date    HX CIRCUMCISION      HX HERNIA REPAIR Bilateral        Diagnostic Studies:  No flowsheet data found. Visit Vitals  /71   Pulse 63   Ht 5' 11\" (1.803 m)   Wt 105.7 kg (233 lb)   BMI 32.50 kg/m²       Mr. Juan Whelan has a reminder for a \"due or due soon\" health maintenance. I have asked that he contact his primary care provider for follow-up on this health maintenance. Physical Exam   Constitutional: He is oriented to person, place, and time. He appears well-developed and well-nourished. No distress. HENT:   Head: Normocephalic and atraumatic. Mouth/Throat: Normal dentition. Eyes: Right eye exhibits no discharge. Left eye exhibits no discharge. No scleral icterus. Neck: Neck supple. No JVD present. Carotid bruit is not present. No thyromegaly present.    Cardiovascular: Normal rate, regular rhythm, S1 normal, S2 normal, normal heart sounds and intact distal pulses. Exam reveals no gallop and no friction rub. No murmur heard. Pulmonary/Chest: Effort normal and breath sounds normal. He has no wheezes. He has no rales. Abdominal: Soft. He exhibits no mass. There is no abdominal tenderness. Musculoskeletal:         General: Edema (trace) present. Lymphadenopathy:        Right cervical: No superficial cervical adenopathy present. Left cervical: No superficial cervical adenopathy present. Neurological: He is alert and oriented to person, place, and time. Skin: Skin is warm and dry. No rash noted. Psychiatric: He has a normal mood and affect. His behavior is normal.       ASSESSMENT and PLAN        Discussed the patient's above normal BMI with him. I have recommended the following interventions: dietary management education, guidance, and counseling . The BMI follow up plan is as follows: BMI is out of normal parameters and plan is as follows: I have counseled this patient on diet and exercise regimens    4/18 Vernette Castleman; use Teds rather than more diuresis due to h/o syncope  Elevated LFT: 12/18 SGOT 42, SGPT 58    Tolerating pravastatin 20 mg and repeat lipids revealed  HLD: 8/19 , LDL 76, TG 96;    Blood pressure is controlled. CHF is compensated NYHA class II. Lipids are controlled as of 8/19 but will get new labs from PCP as they were done a few weeks ago. Continue same medications. Diet exercise and weight discussed. Recommended to reduce salt. Diagnoses and all orders for this visit:    1. Chronic diastolic heart failure ([address]    2. Essential hypertension, benign    3. Non-rheumatic mitral regurgitation    4. Pure hypercholesterolemia    5. Obesity (BMI 30.0-34. 9)        Pertinent laboratory and test data reviewed and discussed with patient.   See patient instructions also for other medical advice given    Medications Discontinued During This Encounter   Medication Reason    valsartan-hydroCHLOROthiazide (DIOVAN-HCT) 320-25 mg per tablet Alternate Therapy       Follow-up and Dispositions    · Return in about 6 months (around 1/9/2021), or if symptoms worsen or fail to improve, for Get labs from PCP including lipids.

## 2020-07-09 NOTE — PATIENT INSTRUCTIONS
Medications Discontinued During This Encounter Medication Reason  valsartan-hydroCHLOROthiazide (DIOVAN-HCT) 320-25 mg per tablet Alternate Therapy Avoiding Triggers With Heart Failure: Care Instructions Your Care Instructions Triggers are anything that make your heart failure flare up. A flare-up is also called \"sudden heart failure\" or \"acute heart failure. \" When you have a flare-up, fluid builds up in your lungs, and you have problems breathing. You might need to go to the hospital. By watching for changes in your condition and avoiding triggers, you can prevent heart failure flare-ups. Follow-up care is a key part of your treatment and safety. Be sure to make and go to all appointments, and call your doctor if you are having problems. It's also a good idea to know your test results and keep a list of the medicines you take. How can you care for yourself at home? Watch for changes in your weight and condition · Weigh yourself without clothing at the same time each day. Record your weight. Call your doctor if you have sudden weight gain, such as more than 2 to 3 pounds in a day or 5 pounds in a week. (Your doctor may suggest a different range of weight gain.) A sudden weight gain may mean that your heart failure is getting worse. · Keep a daily record of your symptoms. Write down any changes in how you feel, such as new shortness of breath, cough, or problems eating. Also record if your ankles are more swollen than usual and if you feel more tired than usual. Note anything that you ate or did that could have triggered these changes. Limit sodium Sodium causes your body to hold on to extra water. This may cause your heart failure symptoms to get worse. People get most of their sodium from processed foods. Fast food and restaurant meals also tend to be very high in sodium. · Your doctor may suggest that you limit sodium.  Your doctor can tell you how much sodium is right for you. This includes limiting sodium in cooked and packaged foods. · Read food labels on cans and food packages. They tell you how much sodium you get in one serving. Check the serving size. If you eat more than one serving, you are getting more sodium. · Be aware that sodium can come in forms other than salt, including monosodium glutamate (MSG), sodium citrate, and sodium bicarbonate (baking soda). MSG is often added to Asian food. You can sometimes ask for food without MSG or salt. · Slowly reducing salt will help you adjust to the taste. Take the salt shaker off the table. · Flavor your food with garlic, lemon juice, onion, vinegar, herbs, and spices instead of salt. Do not use soy sauce, steak sauce, onion salt, garlic salt, mustard, or ketchup on your food, unless it is labeled \"low-sodium\" or \"low-salt. \" 
· Make your own salad dressings, sauces, and ketchup without adding salt. · Use fresh or frozen ingredients, instead of canned ones, whenever you can. Choose low-sodium canned goods. · Eat less processed food and food from restaurants, including fast food. Exercise as directed Moderate, regular exercise is very good for your heart. It improves your blood flow and helps control your weight. But too much exercise can stress your heart and cause a heart failure flare-up. · Check with your doctor before you start an exercise program. 
· Walking is an easy way to get exercise. Start out slowly. Gradually increase the length and pace of your walk. Swimming, riding a bike, and using a treadmill are also good forms of exercise. · When you exercise, watch for signs that your heart is working too hard. You are pushing yourself too hard if you cannot talk while you are exercising. If you become short of breath or dizzy or have chest pain, stop, sit down, and rest. 
· Do not exercise when you do not feel well. Take medicines correctly · Take your medicines exactly as prescribed. Call your doctor if you think you are having a problem with your medicine. · Make a list of all the medicines you take. Include those prescribed to you by other doctors and any over-the-counter medicines, vitamins, or supplements you take. Take this list with you when you go to any doctor. · Take your medicines at the same time every day. It may help you to post a list of all the medicines you take every day and what time of day you take them. · Make taking your medicine as simple as you can. Plan times to take your medicines when you are doing other things, such as eating a meal or getting ready for bed. This will make it easier to remember to take your medicines. · Get organized. Use helpful tools, such as daily or weekly pill containers. When should you call for help? Call 911 if you have symptoms of sudden heart failure such as: 
· You have severe trouble breathing. · You cough up pink, foamy mucus. · You have a new irregular or rapid heartbeat. Call your doctor now or seek immediate medical care if: 
· You have new or increased shortness of breath. · You are dizzy or lightheaded, or you feel like you may faint. · You have sudden weight gain, such as more than 2 to 3 pounds in a day or 5 pounds in a week. (Your doctor may suggest a different range of weight gain.) · You have increased swelling in your legs, ankles, or feet. · You are suddenly so tired or weak that you cannot do your usual activities. Watch closely for changes in your health, and be sure to contact your doctor if you develop new symptoms. Where can you learn more? Go to http://wade-indiana.info/ Enter B858 in the search box to learn more about \"Avoiding Triggers With Heart Failure: Care Instructions. \" Current as of: December 16, 2019               Content Version: 12.5 © 1901-3806 [employer]. Care instructions adapted under license by [employer] (which disclaims liability or warranty for this information). If you have questions about a medical condition or this instruction, always ask your healthcare professional. [address] any warranty or liability for your use of this information. [employer] Activation Thank you for requesting access to [employer]. Please follow the instructions below to securely access and download your online medical record. [employer] allows you to send messages to your doctor, view your test results, renew your prescriptions, schedule appointments, and more. How Do I Sign Up? 1. In your internet browser, go to [email or web address]. 2. Click on the First Time User? Click Here link in the Sign In box. You will see the New Member Sign Up page. 3. Enter your [employer] Access Code exactly as it appears below. You will not need to use this code after youve completed the sign-up process. If you do not sign up before the expiration date, you must request a new code. [employer] Access Code: [email or web address] Expires: 2020  4:29 PM (This is the date your [employer] access code will ) 4. Enter the last four digits of your Social Security Number (xxxx) and Date of Birth (mm/dd/yyyy) as indicated and click Submit. You will be taken to the next sign-up page. 5. Create a [employer] ID. This will be your [employer] login ID and cannot be changed, so think of one that is secure and easy to remember. 6. Create a [employer] password. You can change your password at any time. 7. Enter your Password Reset Question and Answer. This can be used at a later time if you forget your password. 8. Enter your e-mail address. You will receive e-mail notification when new information is available in [address] 9. Click Sign Up. You can now view and download portions of your medical record. 10. Click the Download Summary menu link to download a portable copy of your medical information. Additional Information If you have questions, please visit the Frequently Asked Questions section of the [employer] website at [email or web address]. [employer]/. Remember, [employer] is NOT to be used for urgent needs. For medical emergencies, dial 911. Requested labs from PCP.

## 2020-07-09 NOTE — PROGRESS NOTES
1. Have you been to the ER, urgent care clinic since your last visit? Hospitalized since your last visit?     no  2. Have you seen or consulted any other health care providers outside of the [address] [place] [address] since your last visit? Include any pap smears or colon screening. Yes Where: pcp     3. Since your last visit, have you had any of the following symptoms?      swelling in legs/arms. 4.  Have you had any blood work, X-rays or cardiac testing? Yes Where: pcp       5. Where do you normally have your labs drawn?   pcp  6. Do you need any refills today?    no

## 2020-07-13 ENCOUNTER — HOSPITAL ENCOUNTER (OUTPATIENT)
Dept: LAB | Age: 80
Discharge: HOME | End: 2020-07-13
Payer: MEDICARE

## 2020-07-13 DIAGNOSIS — Z01.818 PREOP EXAMINATION: ICD-10-CM

## 2020-07-13 LAB
ALBUMIN SERPL-MCNC: 3.7 G/DL (ref 3.4–5)
ALBUMIN/GLOB SERPL: 1.2 {RATIO} (ref 0.8–1.7)
ALP SERPL-CCNC: 74 U/L (ref 45–117)
ALT SERPL-CCNC: 59 U/L (ref 16–61)
ANION GAP SERPL CALC-SCNC: 8 MMOL/L (ref 3–18)
AST SERPL-CCNC: 39 U/L (ref 10–38)
BASOPHILS # BLD: 0 K/UL (ref 0–0.1)
BASOPHILS NFR BLD: 0 % (ref 0–2)
BILIRUB SERPL-MCNC: 0.3 MG/DL (ref 0.2–1)
BUN SERPL-MCNC: 25 MG/DL (ref 7–18)
BUN/CREAT SERPL: 22 (ref 12–20)
CALCIUM SERPL-MCNC: 9.5 MG/DL (ref 8.5–10.1)
CHLORIDE SERPL-SCNC: 107 MMOL/L (ref 100–111)
CO2 SERPL-SCNC: 27 MMOL/L (ref 21–32)
CREAT SERPL-MCNC: 1.16 MG/DL (ref 0.6–1.3)
DIFFERENTIAL METHOD BLD: ABNORMAL
EOSINOPHIL # BLD: 0.3 K/UL (ref 0–0.4)
EOSINOPHIL NFR BLD: 4 % (ref 0–5)
ERYTHROCYTE [DISTWIDTH] IN BLOOD BY AUTOMATED COUNT: 13.9 % (ref 11.6–14.5)
GLOBULIN SER CALC-MCNC: 3.1 G/DL (ref 2–4)
GLUCOSE SERPL-MCNC: 91 MG/DL (ref 74–99)
HBA1C MFR BLD: 6.6 % (ref 4.2–5.6)
HCT VFR BLD AUTO: 40.9 % (ref 36–48)
HGB BLD-MCNC: 13.1 G/DL (ref 13–16)
LYMPHOCYTES # BLD: 1.3 K/UL (ref 0.9–3.6)
LYMPHOCYTES NFR BLD: 18 % (ref 21–52)
MCH RBC QN AUTO: 28.3 PG (ref 24–34)
MCHC RBC AUTO-ENTMCNC: 32 G/DL (ref 31–37)
MCV RBC AUTO: 88.3 FL (ref 74–97)
MONOCYTES # BLD: 0.5 K/UL (ref 0.05–1.2)
MONOCYTES NFR BLD: 6 % (ref 3–10)
NEUTS SEG # BLD: 5 K/UL (ref 1.8–8)
NEUTS SEG NFR BLD: 72 % (ref 40–73)
PLATELET # BLD AUTO: 208 K/UL (ref 135–420)
PMV BLD AUTO: 10.9 FL (ref 9.2–11.8)
POTASSIUM SERPL-SCNC: 4 MMOL/L (ref 3.5–5.5)
PROT SERPL-MCNC: 6.8 G/DL (ref 6.4–8.2)
RBC # BLD AUTO: 4.63 M/UL (ref 4.7–5.5)
SODIUM SERPL-SCNC: 142 MMOL/L (ref 136–145)
WBC # BLD AUTO: 7 K/UL (ref 4.6–13.2)

## 2020-07-13 PROCEDURE — 93005 ELECTROCARDIOGRAM TRACING: CPT

## 2020-07-13 PROCEDURE — 36415 COLL VENOUS BLD VENIPUNCTURE: CPT

## 2020-07-13 PROCEDURE — 83036 HEMOGLOBIN GLYCOSYLATED A1C: CPT

## 2020-07-13 PROCEDURE — 85025 COMPLETE CBC W/AUTO DIFF WBC: CPT

## 2020-07-13 PROCEDURE — 80053 COMPREHEN METABOLIC PANEL: CPT

## 2020-07-14 LAB
ATRIAL RATE: 58 BPM
CALCULATED R AXIS, ECG10: 8 DEGREES
CALCULATED T AXIS, ECG11: 5 DEGREES
DIAGNOSIS, 93000: NORMAL
P-R INTERVAL, ECG05: 204 MS
Q-T INTERVAL, ECG07: 428 MS
QRS DURATION, ECG06: 92 MS
QTC CALCULATION (BEZET), ECG08: 420 MS
VENTRICULAR RATE, ECG03: 58 BPM

## 2020-07-17 ENCOUNTER — HOSPITAL ENCOUNTER (OUTPATIENT)
Dept: LAB | Age: 80
Discharge: HOME | End: 2020-07-17
Payer: MEDICARE

## 2020-07-17 DIAGNOSIS — Z01.818 PREOP EXAMINATION: ICD-10-CM

## 2020-07-17 PROCEDURE — 87635 SARS-COV-2 COVID-19 AMP PRB: CPT

## 2020-07-20 DIAGNOSIS — G56.22 CUBITAL TUNNEL SYNDROME, LEFT: Primary | ICD-10-CM

## 2020-07-20 LAB — SARS-COV-2, COV2NT: NOT DETECTED

## 2020-07-20 RX ORDER — HYDROCODONE BITARTRATE AND ACETAMINOPHEN 7.5; 325 MG/1; MG/1
1 TABLET ORAL
Qty: 20 TAB | Refills: 0 | Status: SHIPPED | OUTPATIENT
Start: 2020-07-20 | End: 2020-07-25

## 2020-07-29 ENCOUNTER — TELEPHONE (OUTPATIENT)
Dept: ORTHOPEDIC SURGERY | Age: 80
End: 2020-07-29

## 2020-07-29 NOTE — TELEPHONE ENCOUNTER
As I discussed with him the day of surgery, Please instruct him to remove the dressing, wash with soap and water and leave open to air. Keep wound dry and do not apply and lotion or ointment.

## 2020-07-29 NOTE — TELEPHONE ENCOUNTER
Patient dressing is unraveling and falling off his arm. They are asking if he can be seen to re-wrap today. Please advise.        Gavin Tao, wife, HIPAA checked [phone]

## 2020-08-03 ENCOUNTER — OFFICE VISIT (OUTPATIENT)
Dept: ORTHOPEDIC SURGERY | Age: 80
End: 2020-08-03

## 2020-08-03 VITALS
DIASTOLIC BLOOD PRESSURE: 81 MMHG | HEART RATE: 66 BPM | TEMPERATURE: 96.9 F | SYSTOLIC BLOOD PRESSURE: 152 MMHG | HEIGHT: 71 IN | BODY MASS INDEX: 32.62 KG/M2 | WEIGHT: 233 LBS

## 2020-08-03 DIAGNOSIS — Z98.890 S/P CUBITAL TUNNEL RELEASE: ICD-10-CM

## 2020-08-03 DIAGNOSIS — G56.21 CUBITAL TUNNEL SYNDROME, RIGHT: ICD-10-CM

## 2020-08-03 DIAGNOSIS — G56.22 CUBITAL TUNNEL SYNDROME, LEFT: Primary | ICD-10-CM

## 2020-08-03 NOTE — PROGRESS NOTES
Maria G Dean is a [de-identified] y.o. male right handed retiree. Worker's Compensation and legal considerations: none filed. Vitals:    08/03/20 0947   BP: 152/81   Pulse: 66   Temp: 96.9 °F (36.1 °C)   Weight: 233 lb (105.7 kg)   Height: 5' 11\" (1.803 m)   PainSc:   1   PainLoc: Arm           Chief Complaint   Patient presents with    Arm Pain     left       HPI: Patient comes in today for his first postoperative visit approximately 2 weeks status post left cubital tunnel release. Reports some continued numbness in the fingers although better compared to prior to surgery. Preop HPI:  [facility] UE EMG F/U. Pain and numbness on left side but none on right. Initial HPI: Patient comes in today with complaints of bilateral hand numbness and tingling. He reports is starting around his shoulder and radiating down especially on the left side. 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 He has not seen anyone for this. He has not been diagnosed with any peripheral mononeuropathies and not been given braces at any point to wear.     Date of onset:  indeterminate    Injury: No    Prior Treatment:  Yes: Comment: Left cubital tunnel release 7/2020    Numbness/ Tingling: Yes: Comment: bilateral hands radiatimg from shoulder      ROS: Review of Systems - General ROS: negative  Psychological ROS: negative  ENT ROS: negative  Allergy and Immunology ROS: negative  Hematological and Lymphatic ROS: negative  Respiratory ROS: no cough, shortness of breath, or wheezing  Cardiovascular ROS: no chest pain or dyspnea on exertion  Gastrointestinal ROS: no abdominal pain, change in bowel habits, or black or bloody stools  Musculoskeletal ROS: positive for - pain in shoulder - left  Neurological ROS: positive for - numbness/tingling  Dermatological ROS: negative    Past Medical History:   Diagnosis Date    Allergic sinusitis     BPH (benign prostatic hyperplasia)     Chronic diastolic heart failure (HCC) 2/11/2014    Congestive heart failure, unspecified     DJD (degenerative joint disease)     Erectile dysfunction     Essential hypertension     Hydrocele     Hyperlipidemia 5/2/2016    Mitral valve disorders(424.0) 2/11/2014 1/14 mild MR     Obesity, unspecified 11/10/2014    Syncope and collapse 1/14/2014 1/14 likely vagal, post 1st dose hytrin 5/15 no recurrence        Past Surgical History:   Procedure Laterality Date    HX CIRCUMCISION      HX HERNIA REPAIR Bilateral        Current Outpatient Medications   Medication Sig Dispense Refill    candesartan-hydroCHLOROthiazide (ATACAND HCT) 32-25 mg tab per tablet Take 1 Tab by mouth daily.  pravastatin (PRAVACHOL) 20 mg tablet Take 1 Tab by mouth nightly. 90 Tab 2    latanoprost (XALATAN) 0.005 % ophthalmic solution   0    fluticasone propionate (FLONASE) 50 mcg/actuation nasal spray instill 2 sprays into each nostril once daily  0    finasteride (PROSCAR) 5 mg tablet take 1 tablet by mouth once daily 90 Tab 3    silodosin (RAPAFLO) 8 mg capsule take 1 capsule by mouth once daily with BREAKFAST 90 Cap 3    metoprolol tartrate (LOPRESSOR) 50 mg tablet take 1 tablet by mouth twice a day 60 Tab 6    metFORMIN (GLUCOPHAGE) 500 mg tablet Take  by mouth daily (with breakfast).  amLODIPine (NORVASC) 5 mg tablet take 1 tablet by mouth once daily 30 Tab 3    miscellaneous medical supply (T.E.D. ANTI-EMBOLISM STOCKING) misc 1 Each by Does Not Apply route daily. 2 Each 0    montelukast (SINGULAIR) 10 mg tablet Take 10 mg by mouth daily. No Known Allergies        PE:     Physical Exam  Vitals signs and nursing note reviewed. Constitutional:       General: He is not in acute distress. Appearance: Normal appearance. He is not ill-appearing. Eyes:      Pupils: Pupils are equal, round, and reactive to light. Neck:      Musculoskeletal: Normal range of motion. Cardiovascular:      Pulses: Normal pulses.    Pulmonary:      Effort: Pulmonary effort is normal. No respiratory distress. Abdominal:      General: Abdomen is flat. Musculoskeletal: Normal range of motion. General: No swelling, tenderness, deformity or signs of injury. Right lower leg: No edema. Left lower leg: No edema. Skin:     General: Skin is warm and dry. Capillary Refill: Capillary refill takes less than 2 seconds. Findings: No bruising, erythema, lesion or rash. Neurological:      General: No focal deficit present. Mental Status: He is alert and oriented to person, place, and time. Cranial Nerves: No cranial nerve deficit. Sensory: Sensory deficit present. Psychiatric:         Mood and Affect: Mood normal.         Behavior: Behavior normal.          Left UE: Incision is clean dry and intact no evidence of breakdown drainage erythema or any signs of infection. Range of motion of the elbow is full. Sensation is grossly intact distally and cap refill is brisk. NEUROVASCULAR    Examination L R Examination L R   Carpal Comp. - - Pronator Comp. - -   Carpal Tinel - - Pronator Tinel - -   Phalen's - - Pronator Stress - -   Cubital Comp. - - Guyon Comp. - -   Cubital Tinel - + Guyon Tinel - -   Elbow Hyperflexion - - Adson's - -   Spurling's ? ? SC Comp. - -   PCB Median abn - - SC Tinel - -   Radial Tinel - - IC Comp. - -   Digital Tinel - - IC Tinel - -   Radial 2-Pt WNL WNL Ulnar 2-Pt WNL WNL     Radial Pulse: 2+  Capillary Refill: < 2 sec  [employer]: Not Performed  [employer]: Not Performed    NCV & EMG Findings:  Evaluation of the left median motor and the right median motor nerves showed decreased conduction velocity (Elbow-Wrist, L47, R47 m/s). The left ulnar motor and the right ulnar motor nerves showed decreased conduction velocity (A Elbow-B Elbow, L38, R42 m/s). The left median sensory and the right median sensory nerves showed prolonged distal peak latency (L3.8, R3.7 ms) and decreased conduction velocity (Wrist-2nd Digit, L37, R38 m/s).   The left ulnar sensory and the right ulnar sensory nerves showed prolonged distal peak latency (L3.9, R3.8 ms), reduced amplitude (L7.3, R8.7 µV), and decreased conduction velocity (Wrist-5th Digit, L36, R37 m/s). All remaining nerves (as indicated in the following tables) were within normal limits. Left vs. Right side comparison data for the ulnar motor nerve indicates abnormal L-R latency difference (0.8 ms). All remaining left vs. right side differences were within normal limits.       All examined muscles (as indicated in the following table) showed no evidence of electrical instability.       INTERPRETATION  This is an abnormal electrodiagnostic examination. These findings may be consistent with:  1. 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 Sensory peripheral neuropathy -  This is based on diffuse slowing of the sensory nerves throughout his NCS     There are no electrodiagnostic findings consistent with cervical radiculopathy.        CLINICAL INTERPRETATION  His electrodiagnostic findings may explain his bilateral hand symptoms. His left shoulder pain localizes to the teres minor on physical.     Imaging:     Plain films dated 6/1/2020 of the cervical spine shows degenerative changes throughout most levels. There is no acute fracture or dislocation noted. ICD-10-CM ICD-9-CM    1. Cubital tunnel syndrome, left  G56.22 354.2    2. S/P cubital tunnel release  Z98.890 V45.89    3. Cubital tunnel syndrome, right  G56.21 354.2          Plan:     Continue range of motion exercises and scar care. Instructed the patient on scar massage and keeping the wound clean and dry. I also told him to let me know if the right side starts bothering him as we can proceed with surgery in this as it is a moderate cubital tunnel on EMG. Follow-up and Dispositions    · Return in about 4 weeks (around 8/31/2020) for Reevaluation and wound check.           Plan was reviewed with patient, who verbalized agreement and understanding of the plan

## 2020-09-03 ENCOUNTER — OFFICE VISIT (OUTPATIENT)
Dept: ORTHOPEDIC SURGERY | Age: 80
End: 2020-09-03

## 2020-09-03 VITALS
SYSTOLIC BLOOD PRESSURE: 129 MMHG | OXYGEN SATURATION: 96 % | RESPIRATION RATE: 16 BRPM | BODY MASS INDEX: 33.18 KG/M2 | HEART RATE: 61 BPM | HEIGHT: 71 IN | TEMPERATURE: 97.5 F | DIASTOLIC BLOOD PRESSURE: 61 MMHG | WEIGHT: 237 LBS

## 2020-09-03 DIAGNOSIS — Z98.890 S/P CUBITAL TUNNEL RELEASE: ICD-10-CM

## 2020-09-03 DIAGNOSIS — M54.12 CERVICAL RADICULOPATHY: ICD-10-CM

## 2020-09-03 DIAGNOSIS — G62.9 PERIPHERAL POLYNEUROPATHY: ICD-10-CM

## 2020-09-03 DIAGNOSIS — G56.22 CUBITAL TUNNEL SYNDROME, LEFT: Primary | ICD-10-CM

## 2020-09-03 DIAGNOSIS — G56.21 CUBITAL TUNNEL SYNDROME, RIGHT: ICD-10-CM

## 2020-09-03 NOTE — PROGRESS NOTES
Malia Rasmussen is a 79 y.o. male right handed retiree. Worker's Compensation and legal considerations: none filed. Vitals:    09/03/20 1114   BP: 129/61   Pulse: 61   Resp: 16   Temp: 97.5 °F (36.4 °C)   SpO2: 96%   Weight: 237 lb (107.5 kg)   Height: 5' 11\" (1.803 m)   PainSc:   0 - No pain           Chief Complaint   Patient presents with    Elbow Pain     follow up for the left elbow. HPI: Patient comes in today for second postoperative appointment 6 weeks status post left cubital tunnel release. He reports that the incision pain is almost completely gone and then his numbness is much improved. He does report some numbness in his middle finger that is periodic. He also reports occasional numbness in the right side. He says he is not quite ready for surgery on the right side yet. 2wk PO HPI: Patient comes in today for his first postoperative visit approximately 2 weeks status post left cubital tunnel release. Reports some continued numbness in the fingers although better compared to prior to surgery. Preop HPI:  [facility] UE EMG F/U. Pain and numbness on left side but none on right. Initial HPI: Patient comes in today with complaints of bilateral hand numbness and tingling. He reports is starting around his shoulder and radiating down especially on the left side. 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 He has not seen anyone for this. He has not been diagnosed with any peripheral mononeuropathies and not been given braces at any point to wear.     Date of onset:  indeterminate    Injury: No    Prior Treatment:  Yes: Comment: Left cubital tunnel release 7/2020    Numbness/ Tingling: Yes: Comment: bilateral hands radiatimg from shoulder      ROS: Review of Systems - General ROS: negative  Psychological ROS: negative  ENT ROS: negative  Allergy and Immunology ROS: negative  Hematological and Lymphatic ROS: negative  Respiratory ROS: no cough, shortness of breath, or wheezing  Cardiovascular ROS: no chest pain or dyspnea on exertion  Gastrointestinal ROS: no abdominal pain, change in bowel habits, or black or bloody stools  Musculoskeletal ROS: positive for - pain in shoulder - left  Neurological ROS: positive for - numbness/tingling  Dermatological ROS: negative    Past Medical History:   Diagnosis Date    Allergic sinusitis     BPH (benign prostatic hyperplasia)     Chronic diastolic heart failure (HCC) 2/11/2014    Congestive heart failure, unspecified     DJD (degenerative joint disease)     Erectile dysfunction     Essential hypertension     Hydrocele     Hyperlipidemia 5/2/2016    Mitral valve disorders(424.0) 2/11/2014 1/14 mild MR     Obesity, unspecified 11/10/2014    Syncope and collapse 1/14/2014 1/14 likely vagal, post 1st dose hytrin 5/15 no recurrence        Past Surgical History:   Procedure Laterality Date    HX CIRCUMCISION      HX HERNIA REPAIR Bilateral        Current Outpatient Medications   Medication Sig Dispense Refill    candesartan-hydroCHLOROthiazide (ATACAND HCT) 32-25 mg tab per tablet Take 1 Tab by mouth daily.  pravastatin (PRAVACHOL) 20 mg tablet Take 1 Tab by mouth nightly. 90 Tab 2    latanoprost (XALATAN) 0.005 % ophthalmic solution   0    fluticasone propionate (FLONASE) 50 mcg/actuation nasal spray instill 2 sprays into each nostril once daily  0    finasteride (PROSCAR) 5 mg tablet take 1 tablet by mouth once daily 90 Tab 3    silodosin (RAPAFLO) 8 mg capsule take 1 capsule by mouth once daily with BREAKFAST 90 Cap 3    metoprolol tartrate (LOPRESSOR) 50 mg tablet take 1 tablet by mouth twice a day 60 Tab 6    metFORMIN (GLUCOPHAGE) 500 mg tablet Take  by mouth daily (with breakfast).  amLODIPine (NORVASC) 5 mg tablet take 1 tablet by mouth once daily 30 Tab 3    miscellaneous medical supply (T.E.D. ANTI-EMBOLISM STOCKING) misc 1 Each by Does Not Apply route daily.  2 Each 0    montelukast (SINGULAIR) 10 mg tablet Take 10 mg by mouth daily. No Known Allergies        PE:     Physical Exam  Vitals signs and nursing note reviewed. Constitutional:       General: He is not in acute distress. Appearance: Normal appearance. He is not ill-appearing. Eyes:      Pupils: Pupils are equal, round, and reactive to light. Neck:      Musculoskeletal: Normal range of motion. Cardiovascular:      Pulses: Normal pulses. Pulmonary:      Effort: Pulmonary effort is normal. No respiratory distress. Abdominal:      General: Abdomen is flat. Musculoskeletal: Normal range of motion. General: No swelling, tenderness, deformity or signs of injury. Right lower leg: No edema. Left lower leg: No edema. Skin:     General: Skin is warm and dry. Capillary Refill: Capillary refill takes less than 2 seconds. Findings: No bruising, erythema, lesion or rash. Neurological:      General: No focal deficit present. Mental Status: He is alert and oriented to person, place, and time. Cranial Nerves: No cranial nerve deficit. Sensory: Sensory deficit present. Psychiatric:         Mood and Affect: Mood normal.         Behavior: Behavior normal.          Left UE: Incision is healed. Sensation is intact distally and cap refill is brisk. NEUROVASCULAR    Examination L R Examination L R   Carpal Comp. - - Pronator Comp. - -   Carpal Tinel - - Pronator Tinel - -   Phalen's - - Pronator Stress - -   Cubital Comp. - - Guyon Comp. - -   Cubital Tinel - + Guyon Tinel - -   Elbow Hyperflexion - - Adson's - -   Spurling's ? ? SC Comp. - -   PCB Median abn - - SC Tinel - -   Radial Tinel - - IC Comp.  - -   Digital Tinel - - IC Tinel - -   Radial 2-Pt WNL WNL Ulnar 2-Pt WNL WNL     Radial Pulse: 2+  Capillary Refill: < 2 sec  [employer]: Not Performed  [employer]: Not Performed    NCV & EMG Findings:  Evaluation of the left median motor and the right median motor nerves showed decreased conduction velocity (Elbow-Wrist, L47, R47 m/s). The left ulnar motor and the right ulnar motor nerves showed decreased conduction velocity (A Elbow-B Elbow, L38, R42 m/s). The left median sensory and the right median sensory nerves showed prolonged distal peak latency (L3.8, R3.7 ms) and decreased conduction velocity (Wrist-2nd Digit, L37, R38 m/s). The left ulnar sensory and the right ulnar sensory nerves showed prolonged distal peak latency (L3.9, R3.8 ms), reduced amplitude (L7.3, R8.7 µV), and decreased conduction velocity (Wrist-5th Digit, L36, R37 m/s). All remaining nerves (as indicated in the following tables) were within normal limits. Left vs. Right side comparison data for the ulnar motor nerve indicates abnormal L-R latency difference (0.8 ms). All remaining left vs. right side differences were within normal limits.       All examined muscles (as indicated in the following table) showed no evidence of electrical instability.       INTERPRETATION  This is an abnormal electrodiagnostic examination. These findings may be consistent with:  1. 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 Sensory peripheral neuropathy -  This is based on diffuse slowing of the sensory nerves throughout his NCS     There are no electrodiagnostic findings consistent with cervical radiculopathy.        CLINICAL INTERPRETATION  His electrodiagnostic findings may explain his bilateral hand symptoms. His left shoulder pain localizes to the teres minor on physical.     Imaging:     Plain films dated 6/1/2020 of the cervical spine shows degenerative changes throughout most levels. There is no acute fracture or dislocation noted. ICD-10-CM ICD-9-CM    1. Cubital tunnel syndrome, left  G56.22 354.2    2. S/P cubital tunnel release  Z98.890 V45.89    3. Cubital tunnel syndrome, right  G56.21 354.2    4. Cervical radiculopathy  M54.12 723.4    5.  Peripheral polyneuropathy  G62.9 356.9          Plan: Continue range of motion exercises. Follow-up and Dispositions    · Return if symptoms worsen or fail to improve, for And would like to discuss surgery for the right side. .          Plan was reviewed with patient, who verbalized agreement and understanding of the plan

## 2021-01-07 ENCOUNTER — OFFICE VISIT (OUTPATIENT)
Dept: CARDIOLOGY CLINIC | Age: 81
End: 2021-01-07
Payer: MEDICARE

## 2021-01-07 VITALS
TEMPERATURE: 97.8 F | BODY MASS INDEX: 32.62 KG/M2 | SYSTOLIC BLOOD PRESSURE: 140 MMHG | DIASTOLIC BLOOD PRESSURE: 76 MMHG | WEIGHT: 233 LBS | HEART RATE: 64 BPM | HEIGHT: 71 IN

## 2021-01-07 DIAGNOSIS — E78.00 PURE HYPERCHOLESTEROLEMIA: ICD-10-CM

## 2021-01-07 DIAGNOSIS — E66.9 OBESITY (BMI 30.0-34.9): ICD-10-CM

## 2021-01-07 DIAGNOSIS — I10 ESSENTIAL HYPERTENSION, BENIGN: ICD-10-CM

## 2021-01-07 DIAGNOSIS — I50.32 CHRONIC DIASTOLIC HEART FAILURE (HCC): Primary | ICD-10-CM

## 2021-01-07 DIAGNOSIS — I34.0 NON-RHEUMATIC MITRAL REGURGITATION: ICD-10-CM

## 2021-01-07 PROCEDURE — G8536 NO DOC ELDER MAL SCRN: HCPCS | Performed by: INTERNAL MEDICINE

## 2021-01-07 PROCEDURE — G8753 SYS BP > OR = 140: HCPCS | Performed by: INTERNAL MEDICINE

## 2021-01-07 PROCEDURE — G8427 DOCREV CUR MEDS BY ELIG CLIN: HCPCS | Performed by: INTERNAL MEDICINE

## 2021-01-07 PROCEDURE — 99214 OFFICE O/P EST MOD 30 MIN: CPT | Performed by: INTERNAL MEDICINE

## 2021-01-07 PROCEDURE — G8432 DEP SCR NOT DOC, RNG: HCPCS | Performed by: INTERNAL MEDICINE

## 2021-01-07 PROCEDURE — 1101F PT FALLS ASSESS-DOCD LE1/YR: CPT | Performed by: INTERNAL MEDICINE

## 2021-01-07 PROCEDURE — G8754 DIAS BP LESS 90: HCPCS | Performed by: INTERNAL MEDICINE

## 2021-01-07 PROCEDURE — G8417 CALC BMI ABV UP PARAM F/U: HCPCS | Performed by: INTERNAL MEDICINE

## 2021-01-07 RX ORDER — FUROSEMIDE 20 MG/1
20 TABLET ORAL
Qty: 30 TAB | Refills: 1 | Status: SHIPPED | OUTPATIENT
Start: 2021-01-09 | End: 2021-05-18

## 2021-01-07 RX ORDER — ASPIRIN 81 MG/1
81 TABLET ORAL DAILY
COMMUNITY
End: 2022-07-21

## 2021-01-07 NOTE — PROGRESS NOTES
HISTORY OF PRESENT ILLNESS  Amauri Hitchcock is a 79 y.o. male. Syncope 3-4 hrs post 1st dose of hytrin in 1/14, gradual fall with mild sweating, urinary incontinence, no vomiting  Similar episode in 2005 approx  No recurrence since not taking hytrin in 1/14 5/16 restarted diovan and d/og lisinopril    7/2019 - Stopped taking Atorvastatin due to myalgias    CHF  The history is provided by the medical records. This is a chronic problem. Associated symptoms include shortness of breath. Pertinent negatives include no chest pain and no headaches. Hypertension  The history is provided by the patient and medical records. Associated symptoms include shortness of breath. Pertinent negatives include no chest pain and no headaches. Cholesterol Problem  The history is provided by the medical records. This is a chronic problem. Associated symptoms include shortness of breath. Pertinent negatives include no chest pain and no headaches. Valvular Heart Disease  The history is provided by the medical records (mr). Associated symptoms include shortness of breath. Pertinent negatives include no chest pain and no headaches. Shortness of Breath  The history is provided by the patient. This is a new problem. The current episode started more than 1 week ago (few yrs, mainly fatigue). The problem has not changed since onset. Associated symptoms include leg swelling. Pertinent negatives include no fever, no headaches, no cough, no wheezing, no PND, no orthopnea, no chest pain, no vomiting, no rash and no claudication. The problem's precipitants include exercise (weed eating, rushing to walk; 11/15 cutting grass, weed eating; 1/21 not much active at all ). Leg Swelling  The history is provided by the patient. This is a chronic problem. The current episode started more than 1 week ago. The problem occurs daily. The problem has not changed since onset. Associated symptoms include shortness of breath.  Pertinent negatives include no chest pain and no headaches. The symptoms are aggravated by standing. The symptoms are relieved by sleep. Review of Systems   Constitutional: Negative for chills, fever, malaise/fatigue and weight loss. HENT: Negative for nosebleeds. Eyes: Negative for discharge. Respiratory: Positive for shortness of breath. Negative for cough and wheezing. Cardiovascular: Positive for leg swelling. Negative for chest pain, palpitations, orthopnea, claudication and PND. Gastrointestinal: Negative for diarrhea, nausea and vomiting. Genitourinary: Negative for dysuria and hematuria. Musculoskeletal: Negative for joint pain. Skin: Negative for rash. Neurological: Negative for dizziness, seizures, loss of consciousness and headaches. Endo/Heme/Allergies: Negative for polydipsia. Does not bruise/bleed easily. Psychiatric/Behavioral: Negative for depression and substance abuse. The patient does not have insomnia.       No Known Allergies    Past Medical History:   Diagnosis Date    Allergic sinusitis     BPH (benign prostatic hyperplasia)     Chronic diastolic heart failure (HCC) 2014    Congestive heart failure, unspecified     DJD (degenerative joint disease)     Erectile dysfunction     Essential hypertension     Hydrocele     Hyperlipidemia 2016    Mitral valve disorders(424.0) 2014 mild MR     Obesity, unspecified 11/10/2014    Syncope and collapse 2014 likely vagal, post 1st dose hytrin 5/15 no recurrence        Family History   Problem Relation Age of Onset    Heart Attack Father 61    Heart Attack Brother 58    Prostate Cancer Other     Stroke Other     Hypertension Other        Social History     Tobacco Use    Smoking status: Former Smoker     Quit date: 1974     Years since quittin.0    Smokeless tobacco: Never Used   Substance Use Topics    Alcohol use: No    Drug use: No        Current Outpatient Medications   Medication Sig    aspirin delayed-release 81 mg tablet Take  by mouth daily.  silodosin (Rapaflo) 8 mg capsule take 1 capsule by mouth once daily with BREAKFAST    finasteride (PROSCAR) 5 mg tablet Take 1 tab daily.  candesartan-hydroCHLOROthiazide (ATACAND HCT) 32-25 mg tab per tablet Take 1 Tab by mouth daily.  pravastatin (PRAVACHOL) 20 mg tablet Take 1 Tab by mouth nightly.  latanoprost (XALATAN) 0.005 % ophthalmic solution     fluticasone propionate (FLONASE) 50 mcg/actuation nasal spray instill 2 sprays into each nostril once daily    metoprolol tartrate (LOPRESSOR) 50 mg tablet take 1 tablet by mouth twice a day    metFORMIN (GLUCOPHAGE) 500 mg tablet Take  by mouth daily (with breakfast).  amLODIPine (NORVASC) 5 mg tablet take 1 tablet by mouth once daily    miscellaneous medical supply (T.E.D. ANTI-EMBOLISM STOCKING) misc 1 Each by Does Not Apply route daily.  montelukast (SINGULAIR) 10 mg tablet Take 10 mg by mouth daily. No current facility-administered medications for this visit. Past Surgical History:   Procedure Laterality Date    HX CIRCUMCISION      HX HERNIA REPAIR Bilateral        Diagnostic Studies:  No flowsheet data found. Visit Vitals  BP (!) 140/76   Pulse 64   Temp 97.8 °F (36.6 °C) (Temporal)   Ht 5' 11\" (1.803 m)   Wt 105.7 kg (233 lb)   BMI 32.50 kg/m²       Mr. Erskine Sandifer has a reminder for a \"due or due soon\" health maintenance. I have asked that he contact his primary care provider for follow-up on this health maintenance. Physical Exam   Constitutional: He is oriented to person, place, and time. He appears well-developed and well-nourished. No distress. HENT:   Head: Normocephalic and atraumatic. Mouth/Throat: Normal dentition. Eyes: Right eye exhibits no discharge. Left eye exhibits no discharge. No scleral icterus. Neck: Neck supple. No JVD present. Carotid bruit is not present. No thyromegaly present.    Cardiovascular: Normal rate, regular rhythm, S1 normal, S2 normal, normal heart sounds and intact distal pulses. Exam reveals no gallop and no friction rub. No murmur heard. Pulmonary/Chest: Effort normal. He has no wheezes. He has rales (rt base). Abdominal: Soft. He exhibits no mass. There is no abdominal tenderness. Musculoskeletal:         General: Edema (trace) present. Lymphadenopathy:        Right cervical: No superficial cervical adenopathy present. Left cervical: No superficial cervical adenopathy present. Neurological: He is alert and oriented to person, place, and time. Skin: Skin is warm and dry. No rash noted. Psychiatric: He has a normal mood and affect. His behavior is normal.       ASSESSMENT and PLAN         Shortness of breath is persisting but today has more edema and a few basal right lung rales. Add Lasix 3 times a week. Labs as ordered. Diet and exercise discussed. Recommended to reduce salty foods. He does not walk much due to the knee issues but I encouraged him to stay active as much as possible. Diagnoses and all orders for this visit:    1. Chronic diastolic heart failure (HCC)  -     furosemide (LASIX) 20 mg tablet; Take 1 Tab by mouth Every Mon, Wed and Sat.  -     METABOLIC PANEL, BASIC; Future  -     TSH 3RD GENERATION; Future    2. Essential hypertension, benign    3. Non-rheumatic mitral regurgitation    4. Pure hypercholesterolemia    5. Obesity (BMI 30.0-34. 9)        Pertinent laboratory and test data reviewed and discussed with patient. See patient instructions also for other medical advice given    There are no discontinued medications.     Follow-up and Dispositions    · Return in about 6 months (around 7/7/2021), or if symptoms worsen or fail to improve, for post test.

## 2021-01-07 NOTE — PROGRESS NOTES
1. Have you been to the ER, urgent care clinic since your last visit?  Hospitalized since your last visit?     no  2. Have you seen or consulted any other health care providers outside of the [facility] since your last visit?  Include any pap smears or colon screening.      No     3.  Since your last visit, have you had any of the following symptoms?      swelling in legs/arms.         4.  Have you had any blood work, X-rays or cardiac testing?      Dr. Ahn         5.  Where do you normally have your labs drawn?  Dr. Ahn  6. Do you need any refills today?   no

## 2021-01-07 NOTE — PATIENT INSTRUCTIONS
There are no discontinued medications. Avoiding Triggers With Heart Failure: Care Instructions  Your Care Instructions     Triggers are anything that make your heart failure flare up. A flare-up is also called \"sudden heart failure\" or \"acute heart failure. \" When you have a flare-up, fluid builds up in your lungs, and you have problems breathing. You might need to go to the hospital. By watching for changes in your condition and avoiding triggers, you can prevent heart failure flare-ups. Follow-up care is a key part of your treatment and safety. Be sure to make and go to all appointments, and call your doctor if you are having problems. It's also a good idea to know your test results and keep a list of the medicines you take. How can you care for yourself at home? Watch for changes in your weight and condition  · Weigh yourself without clothing at the same time each day. Record your weight. Call your doctor if you have sudden weight gain, such as more than 2 to 3 pounds in a day or 5 pounds in a week. (Your doctor may suggest a different range of weight gain.) A sudden weight gain may mean that your heart failure is getting worse. · Keep a daily record of your symptoms. Write down any changes in how you feel, such as new shortness of breath, cough, or problems eating. Also record if your ankles are more swollen than usual and if you feel more tired than usual. Note anything that you ate or did that could have triggered these changes. Limit sodium  Sodium causes your body to hold on to extra water. This may cause your heart failure symptoms to get worse. People get most of their sodium from processed foods. Fast food and restaurant meals also tend to be very high in sodium. · Your doctor may suggest that you limit sodium. Your doctor can tell you how much sodium is right for you. This includes limiting sodium in cooked and packaged foods. · Read food labels on cans and food packages.  They tell you how much sodium you get in one serving. Check the serving size. If you eat more than one serving, you are getting more sodium. · Be aware that sodium can come in forms other than salt, including monosodium glutamate (MSG), sodium citrate, and sodium bicarbonate (baking soda). MSG is often added to Asian food. You can sometimes ask for food without MSG or salt. · Slowly reducing salt will help you adjust to the taste. Take the salt shaker off the table. · Flavor your food with garlic, lemon juice, onion, vinegar, herbs, and spices instead of salt. Do not use soy sauce, steak sauce, onion salt, garlic salt, mustard, or ketchup on your food, unless it is labeled \"low-sodium\" or \"low-salt. \"  · Make your own salad dressings, sauces, and ketchup without adding salt. · Use fresh or frozen ingredients, instead of canned ones, whenever you can. Choose low-sodium canned goods. · Eat less processed food and food from restaurants, including fast food. Exercise as directed  Moderate, regular exercise is very good for your heart. It improves your blood flow and helps control your weight. But too much exercise can stress your heart and cause a heart failure flare-up. · Check with your doctor before you start an exercise program.  · Walking is an easy way to get exercise. Start out slowly. Gradually increase the length and pace of your walk. Swimming, riding a bike, and using a treadmill are also good forms of exercise. · When you exercise, watch for signs that your heart is working too hard. You are pushing yourself too hard if you cannot talk while you are exercising. If you become short of breath or dizzy or have chest pain, stop, sit down, and rest.  · Do not exercise when you do not feel well. Take medicines correctly  · Take your medicines exactly as prescribed. Call your doctor if you think you are having a problem with your medicine. · Make a list of all the medicines you take.  Include those prescribed to you by other doctors and any over-the-counter medicines, vitamins, or supplements you take. Take this list with you when you go to any doctor. · Take your medicines at the same time every day. It may help you to post a list of all the medicines you take every day and what time of day you take them. · Make taking your medicine as simple as you can. Plan times to take your medicines when you are doing other things, such as eating a meal or getting ready for bed. This will make it easier to remember to take your medicines. · Get organized. Use helpful tools, such as daily or weekly pill containers. When should you call for help? Call 911 if you have symptoms of sudden heart failure such as:    · You have severe trouble breathing.     · You cough up pink, foamy mucus.     · You have a new irregular or rapid heartbeat. Call your doctor now or seek immediate medical care if:    · You have new or increased shortness of breath.     · You are dizzy or lightheaded, or you feel like you may faint.     · You have sudden weight gain, such as more than 2 to 3 pounds in a day or 5 pounds in a week. (Your doctor may suggest a different range of weight gain.)     · You have increased swelling in your legs, ankles, or feet.     · You are suddenly so tired or weak that you cannot do your usual activities. Watch closely for changes in your health, and be sure to contact your doctor if you develop new symptoms. Where can you learn more? Go to http://www.gray.com/  Enter V089 in the search box to learn more about \"Avoiding Triggers With Heart Failure: Care Instructions. \"  Current as of: December 16, 2019               Content Version: 12.6  © 4686-7171 Healthwise, Incorporated. Care instructions adapted under license by [employer] (which disclaims liability or warranty for this information).  If you have questions about a medical condition or this instruction, always ask your healthcare professional. [employer] disclaims any warranty or liability for your use of this information.

## 2021-01-20 DIAGNOSIS — I10 ESSENTIAL HYPERTENSION, BENIGN: ICD-10-CM

## 2021-01-20 RX ORDER — PRAVASTATIN SODIUM 20 MG/1
TABLET ORAL
Qty: 90 TAB | Refills: 2 | Status: SHIPPED | OUTPATIENT
Start: 2021-01-20 | End: 2022-02-01

## 2021-05-18 DIAGNOSIS — I50.32 CHRONIC DIASTOLIC HEART FAILURE (HCC): ICD-10-CM

## 2021-05-18 RX ORDER — FUROSEMIDE 20 MG/1
TABLET ORAL
Qty: 30 TAB | Refills: 1 | Status: SHIPPED | OUTPATIENT
Start: 2021-05-18 | End: 2021-11-24

## 2021-07-09 ENCOUNTER — OFFICE VISIT (OUTPATIENT)
Dept: CARDIOLOGY CLINIC | Age: 81
End: 2021-07-09
Payer: MEDICARE

## 2021-07-09 VITALS
SYSTOLIC BLOOD PRESSURE: 146 MMHG | HEIGHT: 71 IN | BODY MASS INDEX: 32.06 KG/M2 | HEART RATE: 59 BPM | DIASTOLIC BLOOD PRESSURE: 68 MMHG | WEIGHT: 229 LBS

## 2021-07-09 DIAGNOSIS — I10 ESSENTIAL HYPERTENSION, BENIGN: ICD-10-CM

## 2021-07-09 DIAGNOSIS — E66.9 OBESITY (BMI 30.0-34.9): ICD-10-CM

## 2021-07-09 DIAGNOSIS — E78.00 PURE HYPERCHOLESTEROLEMIA: ICD-10-CM

## 2021-07-09 DIAGNOSIS — I34.0 NON-RHEUMATIC MITRAL REGURGITATION: ICD-10-CM

## 2021-07-09 DIAGNOSIS — I50.32 CHRONIC DIASTOLIC HEART FAILURE (HCC): Primary | ICD-10-CM

## 2021-07-09 PROCEDURE — G8753 SYS BP > OR = 140: HCPCS | Performed by: INTERNAL MEDICINE

## 2021-07-09 PROCEDURE — 1101F PT FALLS ASSESS-DOCD LE1/YR: CPT | Performed by: INTERNAL MEDICINE

## 2021-07-09 PROCEDURE — G8754 DIAS BP LESS 90: HCPCS | Performed by: INTERNAL MEDICINE

## 2021-07-09 PROCEDURE — G8536 NO DOC ELDER MAL SCRN: HCPCS | Performed by: INTERNAL MEDICINE

## 2021-07-09 PROCEDURE — G8427 DOCREV CUR MEDS BY ELIG CLIN: HCPCS | Performed by: INTERNAL MEDICINE

## 2021-07-09 PROCEDURE — 99214 OFFICE O/P EST MOD 30 MIN: CPT | Performed by: INTERNAL MEDICINE

## 2021-07-09 PROCEDURE — G8510 SCR DEP NEG, NO PLAN REQD: HCPCS | Performed by: INTERNAL MEDICINE

## 2021-07-09 PROCEDURE — G8417 CALC BMI ABV UP PARAM F/U: HCPCS | Performed by: INTERNAL MEDICINE

## 2021-07-09 PROCEDURE — 93000 ELECTROCARDIOGRAM COMPLETE: CPT | Performed by: INTERNAL MEDICINE

## 2021-07-09 RX ORDER — AMLODIPINE BESYLATE 2.5 MG/1
2.5 TABLET ORAL DAILY
Qty: 90 TABLET | Refills: 1
Start: 2021-07-09 | End: 2022-03-28 | Stop reason: CLARIF

## 2021-07-09 NOTE — PROGRESS NOTES
1. Have you been to the ER, urgent care clinic since your last visit? Hospitalized since your last visit?    no    2. Have you seen or consulted any other health care providers outside of the [address] [place] [address] since your last visit? Include any pap smears or colon screening. Yes pcp    3. Since your last visit, have you had any of the following symptoms?            swelling in ankles    4. Have you had any blood work, X-rays or cardiac testing?    no            5.  Where do you normally have your labs drawn?   pcp    6. Do you need any refills today?    no

## 2021-07-09 NOTE — PROGRESS NOTES
HISTORY OF PRESENT ILLNESS  Enrico Mederos is a 79 y.o. male. Follow-up of CHF, hypertension, hyperlipidemia, MR     7/21 does not take amlodipine as it makes his head feel funny. Pravastatin causing cramps in the calves. Syncope 3-4 hrs post 1st dose of hytrin in 1/14, gradual fall with mild sweating, urinary incontinence, no vomiting  Similar episode in 2005 approx  No recurrence since not taking hytrin in 1/14 5/16 restarted diovan and d/og lisinopril    7/2019 - Stopped taking Atorvastatin due to myalgias    CHF  The history is provided by the medical records. This is a chronic problem. Associated symptoms include shortness of breath. Pertinent negatives include no chest pain and no headaches. Hypertension  The history is provided by the patient and medical records. Associated symptoms include shortness of breath. Pertinent negatives include no chest pain and no headaches. Cholesterol Problem  The history is provided by the medical records. This is a chronic problem. Associated symptoms include shortness of breath. Pertinent negatives include no chest pain and no headaches. Valvular Heart Disease  The history is provided by the medical records (mr). Associated symptoms include shortness of breath. Pertinent negatives include no chest pain and no headaches. Shortness of Breath  The history is provided by the patient. This is a new problem. The current episode started more than 1 week ago (few yrs, mainly fatigue). The problem has not changed since onset. Associated symptoms include leg swelling. Pertinent negatives include no fever, no headaches, no cough, no wheezing, no PND, no orthopnea, no chest pain, no vomiting, no rash and no claudication. The problem's precipitants include exercise (weed eating, rushing to walk; 11/15 cutting grass, weed eating; 1/21 not much active at all ). Leg Swelling  The history is provided by the patient. This is a chronic problem.  The current episode started more than 1 week ago. The problem occurs daily. The problem has not changed since onset. Associated symptoms include shortness of breath. Pertinent negatives include no chest pain and no headaches. The symptoms are aggravated by standing. The symptoms are relieved by sleep. Review of Systems   Constitutional: Negative for chills, fever, malaise/fatigue and weight loss. HENT: Negative for nosebleeds. Eyes: Negative for discharge. Respiratory: Positive for shortness of breath. Negative for cough and wheezing. Cardiovascular: Positive for leg swelling. Negative for chest pain, palpitations, orthopnea, claudication and PND. Gastrointestinal: Negative for diarrhea, nausea and vomiting. Genitourinary: Negative for dysuria and hematuria. Musculoskeletal: Negative for joint pain. Skin: Negative for rash. Neurological: Negative for dizziness, seizures, loss of consciousness and headaches. Endo/Heme/Allergies: Negative for polydipsia. Does not bruise/bleed easily. Psychiatric/Behavioral: Negative for depression and substance abuse. The patient does not have insomnia.       No Known Allergies    Past Medical History:   Diagnosis Date    Allergic sinusitis     BPH (benign prostatic hyperplasia)     Chronic diastolic heart failure (HCC) 2/11/2014    Congestive heart failure, unspecified     DJD (degenerative joint disease)     Erectile dysfunction     Essential hypertension     Hydrocele     Hyperlipidemia 5/2/2016    Mitral valve disorders(424.0) 2/11/2014 1/14 mild MR     Obesity, unspecified 11/10/2014    Syncope and collapse 1/14/2014 1/14 likely vagal, post 1st dose hytrin 5/15 no recurrence        Family History   Problem Relation Age of Onset    Heart Attack Father 61    Heart Attack Brother 58    Prostate Cancer Other     Stroke Other     Hypertension Other        Social History     Tobacco Use    Smoking status: Former Smoker     Quit date: 1/14/1974     Years since quittin.5    Smokeless tobacco: Never Used   Substance Use Topics    Alcohol use: No    Drug use: No        Current Outpatient Medications   Medication Sig    finasteride (PROSCAR) 5 mg tablet Take 1 tab daily.  furosemide (LASIX) 20 mg tablet take 1 tablet by mouth every MONDAY, WEDNESDAY AND SATURDAY    pravastatin (PRAVACHOL) 20 mg tablet take 1 tablet by mouth nightly    aspirin delayed-release 81 mg tablet Take  by mouth daily.  silodosin (Rapaflo) 8 mg capsule take 1 capsule by mouth once daily with BREAKFAST    candesartan-hydroCHLOROthiazide (ATACAND HCT) 32-25 mg tab per tablet Take 1 Tab by mouth daily.  latanoprost (XALATAN) 0.005 % ophthalmic solution     fluticasone propionate (FLONASE) 50 mcg/actuation nasal spray instill 2 sprays into each nostril once daily    metoprolol tartrate (LOPRESSOR) 50 mg tablet take 1 tablet by mouth twice a day    metFORMIN (GLUCOPHAGE) 500 mg tablet Take  by mouth daily (with breakfast).  amLODIPine (NORVASC) 5 mg tablet take 1 tablet by mouth once daily    miscellaneous medical supply (T.E.D. ANTI-EMBOLISM STOCKING) misc 1 Each by Does Not Apply route daily.  montelukast (SINGULAIR) 10 mg tablet Take 10 mg by mouth daily. No current facility-administered medications for this visit. Past Surgical History:   Procedure Laterality Date    HX CIRCUMCISION      HX HERNIA REPAIR Bilateral        Diagnostic Studies:  No flowsheet data found. Visit Vitals  BP (!) 146/68   Pulse (!) 59   Ht 5' 11\" (1.803 m)   Wt 103.9 kg (229 lb)   BMI 31.94 kg/m²       Mr. Manjit Corral has a reminder for a \"due or due soon\" health maintenance. I have asked that he contact his primary care provider for follow-up on this health maintenance. Physical Exam  Constitutional:       General: He is not in acute distress. Appearance: He is well-developed. HENT:      Head: Normocephalic and atraumatic. Mouth/Throat:      Dentition: Normal dentition. Eyes:      General: No scleral icterus. Right eye: No discharge. Left eye: No discharge. Neck:      Thyroid: No thyromegaly. Vascular: No carotid bruit or JVD. Cardiovascular:      Rate and Rhythm: Normal rate and regular rhythm. Pulses: Intact distal pulses. Heart sounds: Normal heart sounds, S1 normal and S2 normal. No murmur heard. No friction rub. No gallop. Pulmonary:      Effort: Pulmonary effort is normal.      Breath sounds: Rales (rt base) present. No wheezing. Abdominal:      Palpations: Abdomen is soft. There is no mass. Tenderness: There is no abdominal tenderness. Musculoskeletal:      Cervical back: Neck supple. Right lower leg: Edema (Trace) present. Left lower leg: Edema (Trace) present. Lymphadenopathy:      Cervical:      Right cervical: No superficial cervical adenopathy. Left cervical: No superficial cervical adenopathy. Skin:     General: Skin is warm and dry. Findings: No rash. Neurological:      Mental Status: He is alert and oriented to person, place, and time. Psychiatric:         Behavior: Behavior normal.         ASSESSMENT and PLAN         1/21 shortness of breath is persisting but today has more edema and a few basal right lung rales. Add Lasix 3 times a week. Labs as ordered. Diet and exercise discussed. Recommended to reduce salty foods. He does not walk much due to the knee issues but I encouraged him to stay active as much as possible. Diagnoses and all orders for this visit:    1. Chronic diastolic heart failure ([address]    2. Essential hypertension, benign  -     AMB POC EKG ROUTINE W/ 12 LEADS, INTER & REP  -     amLODIPine (NORVASC) 2.5 mg tablet; Take 1 Tablet by mouth daily. 3. Non-rheumatic mitral regurgitation    4. Pure hypercholesterolemia    5. Obesity (BMI 30.0-34. 9)        Pertinent laboratory and test data reviewed and discussed with patient.   See patient instructions also for other medical advice given    Medications Discontinued During This Encounter   Medication Reason    amLODIPine (NORVASC) 5 mg tablet        Follow-up and Dispositions    · Return in about 6 months (around 1/9/2022), or if symptoms worsen or fail to improve, for Get labs from PCP including lipids. 7/9/2021 CHF is compensated with mild edema. NYHA class II-III  Blood pressure is mildly elevated but patient not taking amlodipine as it makes his head feel funny. Reduce the dose to 2.5 mg a day. Requested that he check his blood pressure if he still feels funny in the head and call us. MR philippe. Follow clinically  Labs done at PCP. We will try to get those from them. Diet weight and exercise discussed.

## 2021-07-09 NOTE — PATIENT INSTRUCTIONS
Medications Discontinued During This Encounter   Medication Reason    amLODIPine (NORVASC) 5 mg tablet      Reduce salt and fluid intake       Learning About the Mediterranean Diet  What is the Mediterranean diet? The Mediterranean diet is a style of eating rather than a diet plan. It features foods eaten in [place], [place], [place] and [place], and other countries along the [facility]. It emphasizes eating foods like fish, fruits, vegetables, beans, high-fiber breads and whole grains, nuts, and olive oil. This style of eating includes limited red meat, cheese, and sweets. Why choose the Mediterranean diet? A Mediterranean-style diet may improve heart health. It contains more fat than other heart-healthy diets. But the fats are mainly from nuts, unsaturated oils (such as fish oils and olive oil), and certain nut or seed oils (such as canola, soybean, or flaxseed oil). These fats may help protect the heart and blood vessels. How can you get started on the Mediterranean diet? Here are some things you can do to switch to a more Mediterranean way of eating. What to eat  · Eat a variety of fruits and vegetables each day, such as grapes, blueberries, tomatoes, broccoli, peppers, figs, olives, spinach, eggplant, beans, lentils, and chickpeas. · Eat a variety of whole-grain foods each day, such as oats, brown rice, and whole wheat bread, pasta, and couscous. · Eat fish at least 2 times a week. Try tuna, salmon, mackerel, lake trout, herring, or sardines. · Eat moderate amounts of low-fat dairy products, such as milk, cheese, or yogurt. · Eat moderate amounts of poultry and eggs. · Choose healthy (unsaturated) fats, such as nuts, olive oil, and certain nut or seed oils like canola, soybean, and flaxseed. · Limit unhealthy (saturated) fats, such as butter, palm oil, and coconut oil. And limit fats found in animal products, such as meat and dairy products made with whole milk.  Try to eat red meat only a few times a month in very small amounts. · Limit sweets and desserts to only a few times a week. This includes sugar-sweetened drinks like soda. The Mediterranean diet may also include red wine with your meal1 glass each day for women and up to 2 glasses a day for men. Tips for eating at home  · Use herbs, spices, garlic, lemon zest, and citrus juice instead of salt to add flavor to foods. · Add avocado slices to your sandwich instead of carranza. · Have fish for lunch or dinner instead of red meat. Brush the fish with olive oil, and broil or grill it. · Sprinkle your salad with seeds or nuts instead of cheese. · Cook with olive or canola oil instead of butter or oils that are high in saturated fat. · Switch from 2% milk or whole milk to 1% or fat-free milk. · Dip raw vegetables in a vinaigrette dressing or hummus instead of dips made from mayonnaise or sour cream.  · Have a piece of fruit for dessert instead of a piece of cake. Try baked apples, or have some dried fruit. Tips for eating out  · Try broiled, grilled, baked, or poached fish instead of having it fried or breaded. · Ask your  to have your meals prepared with olive oil instead of butter. · Order dishes made with marinara sauce or sauces made from olive oil. Avoid sauces made from cream or mayonnaise. · Choose whole-grain breads, whole wheat pasta and pizza crust, brown rice, beans, and lentils. · Cut back on butter or margarine on bread. Instead, you can dip your bread in a small amount of olive oil. · Ask for a side salad or grilled vegetables instead of french fries or chips. Where can you learn more? Go to http://www.[employer]com/  Enter O407 in the search box to learn more about \"Learning About the Mediterranean Diet. \"  Current as of: December 17, 2020               Content Version: 12.8  © 6307-7314 Healthwise, Incorporated.    Care instructions adapted under license by [employer] (which disclaims liability or warranty for this information). If you have questions about a medical condition or this instruction, always ask your healthcare professional. [address] any warranty or liability for your use of this information.

## 2021-11-02 LAB — LDL-C, EXTERNAL: 110

## 2021-11-24 DIAGNOSIS — I50.32 CHRONIC DIASTOLIC HEART FAILURE (HCC): ICD-10-CM

## 2021-11-24 RX ORDER — FUROSEMIDE 20 MG/1
TABLET ORAL
Qty: 30 TABLET | Refills: 1 | Status: SHIPPED | OUTPATIENT
Start: 2021-11-24 | End: 2022-01-20

## 2021-12-22 ENCOUNTER — OFFICE VISIT (OUTPATIENT)
Dept: ORTHOPEDIC SURGERY | Age: 81
End: 2021-12-22
Payer: MEDICARE

## 2021-12-22 VITALS
BODY MASS INDEX: 32.06 KG/M2 | HEART RATE: 84 BPM | WEIGHT: 229 LBS | HEIGHT: 71 IN | TEMPERATURE: 97.3 F | OXYGEN SATURATION: 99 %

## 2021-12-22 DIAGNOSIS — M17.12 PRIMARY OSTEOARTHRITIS OF LEFT KNEE: Primary | ICD-10-CM

## 2021-12-22 PROCEDURE — G8432 DEP SCR NOT DOC, RNG: HCPCS | Performed by: ORTHOPAEDIC SURGERY

## 2021-12-22 PROCEDURE — 73560 X-RAY EXAM OF KNEE 1 OR 2: CPT | Performed by: ORTHOPAEDIC SURGERY

## 2021-12-22 PROCEDURE — G8756 NO BP MEASURE DOC: HCPCS | Performed by: ORTHOPAEDIC SURGERY

## 2021-12-22 PROCEDURE — 1101F PT FALLS ASSESS-DOCD LE1/YR: CPT | Performed by: ORTHOPAEDIC SURGERY

## 2021-12-22 PROCEDURE — G8427 DOCREV CUR MEDS BY ELIG CLIN: HCPCS | Performed by: ORTHOPAEDIC SURGERY

## 2021-12-22 PROCEDURE — G8417 CALC BMI ABV UP PARAM F/U: HCPCS | Performed by: ORTHOPAEDIC SURGERY

## 2021-12-22 PROCEDURE — G8536 NO DOC ELDER MAL SCRN: HCPCS | Performed by: ORTHOPAEDIC SURGERY

## 2021-12-22 PROCEDURE — 99203 OFFICE O/P NEW LOW 30 MIN: CPT | Performed by: ORTHOPAEDIC SURGERY

## 2021-12-22 PROCEDURE — 20611 DRAIN/INJ JOINT/BURSA W/US: CPT | Performed by: ORTHOPAEDIC SURGERY

## 2021-12-22 RX ORDER — TRIAMCINOLONE ACETONIDE 40 MG/ML
40 INJECTION, SUSPENSION INTRA-ARTICULAR; INTRAMUSCULAR ONCE
Status: COMPLETED | OUTPATIENT
Start: 2021-12-22 | End: 2021-12-22

## 2021-12-22 RX ADMIN — TRIAMCINOLONE ACETONIDE 40 MG: 40 INJECTION, SUSPENSION INTRA-ARTICULAR; INTRAMUSCULAR at 14:50

## 2021-12-22 NOTE — PROGRESS NOTES
Kaya Shepard  1940   Chief Complaint   Patient presents with    Knee Pain     left knee, req inj        HISTORY OF PRESENT ILLNESS  Kaya Shepard is a 79 y.o. male who presents today for evaluation of left knee. He rates his pain 10/10 today. Pain has been present for 1 week. He notes pain with prolonged standing and walking. Patient describes the pain as aching, sharp and throbbing that is Constant in nature. Symptoms are worse with Bending; Stretching; Walking; Standing and is better with  NSAIDs. Associated symptoms include Swelling. Since problem started, it: is unchanged. Pain does not wake patient up at night. Has taken NSAIDs for the problem. Has tried following treatments: Injections:NO; Brace:NO;  Therapy:NO; Cane/Crutch:NO       No Known Allergies     Past Medical History:   Diagnosis Date    Allergic sinusitis     BPH (benign prostatic hyperplasia)     Chronic diastolic heart failure (HCC) 2014    Congestive heart failure, unspecified     DJD (degenerative joint disease)     Erectile dysfunction     Essential hypertension     Hydrocele     Hyperlipidemia 2016    Mitral valve disorders(424.0) 2014 mild MR     Obesity, unspecified 11/10/2014    Syncope and collapse 2014 likely vagal, post 1st dose hytrin 5/15 no recurrence       Social History     Socioeconomic History    Marital status:      Spouse name: Not on file    Number of children: Not on file    Years of education: Not on file    Highest education level: Not on file   Occupational History    Not on file   Tobacco Use    Smoking status: Former Smoker     Quit date: 1974     Years since quittin.9    Smokeless tobacco: Never Used   Substance and Sexual Activity    Alcohol use: No    Drug use: No    Sexual activity: Not on file   Other Topics Concern    Not on file   Social History Narrative    Not on file     Social Determinants of Health     Financial Resource Strain:     Difficulty of Paying Living Expenses: Not on file   Food Insecurity:     Worried About Running Out of Food in the Last Year: Not on file    Remberto of Food in the Last Year: Not on file   Transportation Needs:     Lack of Transportation (Medical): Not on file    Lack of Transportation (Non-Medical): Not on file   Physical Activity:     Days of Exercise per Week: Not on file    Minutes of Exercise per Session: Not on file   Stress:     Feeling of Stress : Not on file   Social Connections:     Frequency of Communication with Friends and Family: Not on file    Frequency of Social Gatherings with Friends and Family: Not on file    Attends [employer] Services: Not on file    Active Member of [address] [place] [address] or Organizations: Not on file    Attends Club or Organization Meetings: Not on file    Marital Status: Not on file   Intimate Partner Violence:     Fear of Current or Ex-Partner: Not on file    Emotionally Abused: Not on file    Physically Abused: Not on file    Sexually Abused: Not on file   Housing Stability:     Unable to Pay for Housing in the Last Year: Not on file    Number of [place] in the Last Year: Not on file    Unstable Housing in the Last Year: Not on file      Past Surgical History:   Procedure Laterality Date    HX CIRCUMCISION      HX HERNIA REPAIR Bilateral       Family History   Problem Relation Age of Onset    Heart Attack Father 61    Heart Attack Brother 58    Prostate Cancer Other     Stroke Other     Hypertension Other       Current Outpatient Medications   Medication Sig    finasteride (PROSCAR) 5 mg tablet take 1 tablet by mouth once daily    silodosin (Rapaflo) 8 mg capsule take 1 capsule by mouth once daily with BREAKFAST    furosemide (LASIX) 20 mg tablet take 1 tablet by mouth every MONDAY, WEDNESDAY AND SATURDAY    amLODIPine (NORVASC) 2.5 mg tablet Take 1 Tablet by mouth daily.     pravastatin (PRAVACHOL) 20 mg tablet take 1 tablet by mouth nightly    aspirin delayed-release 81 mg tablet Take  by mouth daily.  candesartan-hydroCHLOROthiazide (ATACAND HCT) 32-25 mg tab per tablet Take 1 Tab by mouth daily.  latanoprost (XALATAN) 0.005 % ophthalmic solution     fluticasone propionate (FLONASE) 50 mcg/actuation nasal spray instill 2 sprays into each nostril once daily    metoprolol tartrate (LOPRESSOR) 50 mg tablet take 1 tablet by mouth twice a day    metFORMIN (GLUCOPHAGE) 500 mg tablet Take  by mouth daily (with breakfast).  miscellaneous medical supply (T.E.D. ANTI-EMBOLISM STOCKING) misc 1 Each by Does Not Apply route daily.  montelukast (SINGULAIR) 10 mg tablet Take 10 mg by mouth daily. No current facility-administered medications for this visit. REVIEW OF SYSTEM   Patient denies: Weight loss, Fever/Chills, HA, Visual changes, Fatigue, Chest pain, SOB, Abdominal pain, N/V/D/C, Blood in stool or urine, Edema. Pertinent positive as above in HPI. All others were negative    PHYSICAL EXAM:   Visit Vitals  Pulse 84   Temp 97.3 °F (36.3 °C) (Temporal)   Ht 5' 11\" (1.803 m)   Wt 229 lb (103.9 kg)   SpO2 99%   BMI 31.94 kg/m²     The patient is a well-developed, well-nourished male   in no acute distress. The patient is alert and oriented times three. The patient is alert and oriented times three. Mood and affect are normal.  LYMPHATIC: lymph nodes are not enlarged and are within normal limits  SKIN: normal in color and non tender to palpation. There are no bruises or abrasions noted. NEUROLOGICAL: Motor sensory exam is within normal limits. Reflexes are equal bilaterally.  There is normal sensation to pinprick and light touch  MUSCULOSKELETAL:  Examination Left knee   Skin Intact   Range of motion 0-130   Effusion +   Medial joint line tenderness +   Lateral joint line tenderness +   Tenderness Pes Bursa -   Tenderness insertion MCL -   Tenderness insertion LCL -   Marlens -   Patella crepitus +   Patella grind + Lachman -   Pivot shift -   Anterior drawer -   Posterior drawer -   Varus stress -   Valgus stress -   Neurovascular Intact   Calf Swelling and Tenderness to Palpation -   Elio's Test -   Hamstring Cord Tightness -        PROCEDURE:   Left knee Injection with Ultrasound Guidance    Indication:Left Knee pain/swelling    After sterile prep, 4 cc of Xylocaine and 1 cc of Kenalog were injected into the left Knee. Intra-articular Ultrasound images captured using [address] Ultrasound machine using a frequency of 10 MHz with a linear transducer and scanned into patient's chart. [facility]  OFFICE PROCEDURE PROGRESS NOTE        Chart reviewed for the following:  Chery Wetzel M.D, have reviewed the History, Physical and updated the Allergic reactions for [address] performed immediately prior to start of procedure:  Chery Wetzel M.D, have performed the following reviews on Chelsey Lopez prior to the start of the procedure:            * Patient was identified by name and date of birth   * Agreement on procedure being performed was verified  * Risks and Benefits explained to the patient  * Procedure site verified and marked as necessary  * Patient was positioned for comfort  * Needle placement confirmed by ultrasound  * Consent was signed and verified     Time: 2:19 PM     Date of procedure: 12/22/2021    Procedure performed by:  Vona Pallas, M.D    Provider assisted by: (see medication administration)    How tolerated by patient: tolerated the procedure well with no complications    Comments: none      IMAGING: XR of the left knee with 2 views obtained in the office dated 12/22/2021 was reviewed and read by Dr. Grecia Bonilla: slight decreased joint space on the medial side     IMPRESSION:      ICD-10-CM ICD-9-CM    1. Primary osteoarthritis of left knee  M17.12 715.16 AMB POC XRAY, KNEE; 1/2 VIEWS        PLAN:  1.  Pt presents today with left knee pain due to degenerative arthritis with joint space narrowing and I am hopeful a left knee cortisone injection will provide relief. Return in 3 weeks if pain persists. Risk factors include: BMI>30, htn  2. No ultrasound exam indicated today  3. Yes cortisone injection indicated today L KNEE US   4. No Physical/Occupational Therapy indicated today  5. No diagnostic test indicated today:   6. No durable medical equipment indicated today  7. No referral indicated today   8. No medications indicated today:   9. No Narcotic indicated today     RTC 3 weeks if pain continues       Scribed by Rk Wakefield [address] [place] [address] [address]) as dictated by Fanta Faith MD    I, Dr. Fanta Faith, confirm that all documentation is accurate.     Fanta Faith M.D.   [facility]

## 2021-12-30 ENCOUNTER — OFFICE VISIT (OUTPATIENT)
Dept: ORTHOPEDIC SURGERY | Age: 81
End: 2021-12-30
Payer: MEDICARE

## 2021-12-30 VITALS — BODY MASS INDEX: 30.96 KG/M2 | HEART RATE: 99 BPM | OXYGEN SATURATION: 95 % | WEIGHT: 222 LBS | TEMPERATURE: 98 F

## 2021-12-30 DIAGNOSIS — M17.12 PRIMARY OSTEOARTHRITIS OF LEFT KNEE: Primary | ICD-10-CM

## 2021-12-30 PROCEDURE — G8432 DEP SCR NOT DOC, RNG: HCPCS | Performed by: PHYSICIAN ASSISTANT

## 2021-12-30 PROCEDURE — G8756 NO BP MEASURE DOC: HCPCS | Performed by: PHYSICIAN ASSISTANT

## 2021-12-30 PROCEDURE — G8536 NO DOC ELDER MAL SCRN: HCPCS | Performed by: PHYSICIAN ASSISTANT

## 2021-12-30 PROCEDURE — 1101F PT FALLS ASSESS-DOCD LE1/YR: CPT | Performed by: PHYSICIAN ASSISTANT

## 2021-12-30 PROCEDURE — G8427 DOCREV CUR MEDS BY ELIG CLIN: HCPCS | Performed by: PHYSICIAN ASSISTANT

## 2021-12-30 PROCEDURE — G8417 CALC BMI ABV UP PARAM F/U: HCPCS | Performed by: PHYSICIAN ASSISTANT

## 2021-12-30 PROCEDURE — 99214 OFFICE O/P EST MOD 30 MIN: CPT | Performed by: PHYSICIAN ASSISTANT

## 2021-12-30 RX ORDER — ACETAMINOPHEN AND CODEINE PHOSPHATE 300; 30 MG/1; MG/1
1 TABLET ORAL
Qty: 28 TABLET | Refills: 0 | Status: SHIPPED | OUTPATIENT
Start: 2021-12-30 | End: 2022-01-06

## 2021-12-30 NOTE — PROGRESS NOTES
Liam Anand  1940   Chief Complaint   Patient presents with    Knee Pain     left        HISTORY OF PRESENT ILLNESS  Liam Anand is a 79 y.o. male who presents today for evaluation of left knee. On 12/22 he received a cortisone injection from Dr. Erma Garner. Today he states he is no better. He states that he has had a series of injections in the past and that seemed to work better for him. . Pain is a 8/10. Patient denies any fever, chills, chest pain, shortness of breath or calf pain. The remainder of the review of systems is negative. There are no new illness or injuries to report since last seen in the office. No changes in medications, allergies, social or family history. PHYSICAL EXAM:   Visit Vitals  Pulse 99   Temp 98 °F (36.7 °C) (Temporal)   Wt 222 lb (100.7 kg)   SpO2 95%   BMI 30.96 kg/m²     The patient is a well-developed, well-nourished male   in no acute distress. The patient is alert and oriented times three. The patient is alert and oriented times three. Mood and affect are normal.  LYMPHATIC: lymph nodes are not enlarged and are within normal limits  SKIN: normal in color and non tender to palpation. There are no bruises or abrasions noted. NEUROLOGICAL: Motor sensory exam is within normal limits. Reflexes are equal bilaterally.  There is normal sensation to pinprick and light touch  MUSCULOSKELETAL:  Examination Left knee   Skin Intact   Range of motion 0-130   Effusion +   Medial joint line tenderness +   Lateral joint line tenderness +   Tenderness Pes Bursa -   Tenderness insertion MCL -   Tenderness insertion LCL -   Marlens -   Patella crepitus +   Patella grind +   Lachman -   Pivot shift -   Anterior drawer -   Posterior drawer -   Varus stress -   Valgus stress -   Neurovascular Intact   Calf Swelling and Tenderness to Palpation -   Elio's Test -   Hamstring Cord Tightness -        IMAGING: XR of the left knee with 2 views obtained in the office dated 12/22/2021 was reviewed and read by Dr. Faye Beavers: slight decreased joint space on the medial side     IMPRESSION:      ICD-10-CM ICD-9-CM    1. Primary osteoarthritis of left knee  M17.12 715.16 PROCEDURE AUTHORIZATION TO       acetaminophen-codeine (Tylenol-Codeine #3) 300-30 mg per tablet        PLAN:  1. Pt presents today with left knee pain due to degenerative arthritis with joint space narrowing. Given patient's failure of conservative treatment will authorize viscosupplementation for his left knee. One-time prescription of Tylenol 3 given today for acute pain. Patient given pain medication for short term acute pain relief. Goal is to treat patient according to above plan and to ultimately have patient off all pain medications once appropriate. If chronic pain management is required beyond what is expected for current orthopedic problem, will refer patient to pain management.  was reviewed and will be reviewed with every medication refill request.      Risk factors include: BMI>30, htn  2. No ultrasound exam indicated today  3. No cortisone injection indicated today  4. No Physical/Occupational Therapy indicated today  5. No diagnostic test indicated today:   6. No durable medical equipment indicated today  7. No referral indicated today   8. No medications indicated today:   9.  No Narcotic indicated today     RTC 3 weeks if pain continues      [facility]

## 2022-01-20 ENCOUNTER — OFFICE VISIT (OUTPATIENT)
Dept: CARDIOLOGY CLINIC | Age: 82
End: 2022-01-20
Payer: MEDICARE

## 2022-01-20 VITALS
SYSTOLIC BLOOD PRESSURE: 153 MMHG | OXYGEN SATURATION: 98 % | BODY MASS INDEX: 31.08 KG/M2 | HEIGHT: 71 IN | HEART RATE: 62 BPM | WEIGHT: 222 LBS | DIASTOLIC BLOOD PRESSURE: 81 MMHG

## 2022-01-20 DIAGNOSIS — I50.32 CHRONIC DIASTOLIC HEART FAILURE (HCC): ICD-10-CM

## 2022-01-20 DIAGNOSIS — E66.9 OBESITY (BMI 30.0-34.9): ICD-10-CM

## 2022-01-20 DIAGNOSIS — I34.0 NON-RHEUMATIC MITRAL REGURGITATION: ICD-10-CM

## 2022-01-20 DIAGNOSIS — I10 ESSENTIAL HYPERTENSION, BENIGN: Primary | ICD-10-CM

## 2022-01-20 DIAGNOSIS — E78.00 PURE HYPERCHOLESTEROLEMIA: ICD-10-CM

## 2022-01-20 PROCEDURE — G8417 CALC BMI ABV UP PARAM F/U: HCPCS | Performed by: INTERNAL MEDICINE

## 2022-01-20 PROCEDURE — 99214 OFFICE O/P EST MOD 30 MIN: CPT | Performed by: INTERNAL MEDICINE

## 2022-01-20 PROCEDURE — G8427 DOCREV CUR MEDS BY ELIG CLIN: HCPCS | Performed by: INTERNAL MEDICINE

## 2022-01-20 PROCEDURE — G8754 DIAS BP LESS 90: HCPCS | Performed by: INTERNAL MEDICINE

## 2022-01-20 PROCEDURE — 1101F PT FALLS ASSESS-DOCD LE1/YR: CPT | Performed by: INTERNAL MEDICINE

## 2022-01-20 PROCEDURE — G8753 SYS BP > OR = 140: HCPCS | Performed by: INTERNAL MEDICINE

## 2022-01-20 PROCEDURE — G8536 NO DOC ELDER MAL SCRN: HCPCS | Performed by: INTERNAL MEDICINE

## 2022-01-20 PROCEDURE — G8432 DEP SCR NOT DOC, RNG: HCPCS | Performed by: INTERNAL MEDICINE

## 2022-01-20 RX ORDER — FUROSEMIDE 40 MG/1
40 TABLET ORAL
Qty: 45 TABLET | Refills: 1 | Status: SHIPPED | OUTPATIENT
Start: 2022-01-22 | End: 2022-06-01

## 2022-01-20 NOTE — PROGRESS NOTES
HISTORY OF PRESENT ILLNESS  Yumiko Cordova is a 79 y.o. male. Follow-up of CHF, hypertension, hyperlipidemia, MR     1/22 tolerating amlodipine well now. Says that BP better at home. 7/21 does not take amlodipine as it makes his head feel funny. Pravastatin causing cramps in the calves. Syncope 3-4 hrs post 1st dose of hytrin in 1/14, gradual fall with mild sweating, urinary incontinence, no vomiting  Similar episode in 2005 approx  No recurrence since not taking hytrin in 1/14 5/16 restarted diovan and d/og lisinopril    7/2019 - Stopped taking Atorvastatin due to myalgias    Shortness of Breath  The history is provided by the patient. This is a new problem. The current episode started more than 1 week ago (few yrs, mainly fatigue). The problem has not changed since onset. Associated symptoms include leg swelling. Pertinent negatives include no fever, no headaches, no cough, no wheezing, no PND, no orthopnea, no chest pain, no vomiting, no rash and no claudication. The problem's precipitants include exercise (weed eating, rushing to walk; 11/15 cutting grass, weed eating; 1/21 not much active at all due to knees, mail box; ). Leg Swelling  The history is provided by the patient. This is a chronic problem. The current episode started more than 1 week ago. The problem occurs daily. The problem has not changed since onset. Associated symptoms include shortness of breath. Pertinent negatives include no chest pain and no headaches. The symptoms are aggravated by standing. The symptoms are relieved by sleep. Follow-up  Associated symptoms include shortness of breath. Pertinent negatives include no chest pain and no headaches. Review of Systems   Constitutional: Negative for chills, fever, malaise/fatigue and weight loss. HENT: Negative for nosebleeds. Eyes: Negative for discharge. Respiratory: Positive for shortness of breath. Negative for cough and wheezing.     Cardiovascular: Positive for leg swelling. Negative for chest pain, palpitations, orthopnea, claudication and PND. Gastrointestinal: Negative for diarrhea, nausea and vomiting. Genitourinary: Negative for dysuria and hematuria. Musculoskeletal: Negative for joint pain. Skin: Negative for rash. Neurological: Negative for dizziness, seizures, loss of consciousness and headaches. Endo/Heme/Allergies: Negative for polydipsia. Does not bruise/bleed easily. Psychiatric/Behavioral: Negative for depression and substance abuse. The patient does not have insomnia.       Allergies   Allergen Reactions    Amlodipine Besylate Swelling    Atorvastatin Calcium Unknown (comments)    Tramadol Unknown (comments)       Past Medical History:   Diagnosis Date    Allergic sinusitis     BPH (benign prostatic hyperplasia)     Chronic diastolic heart failure (HCC) 2014    Congestive heart failure, unspecified     DJD (degenerative joint disease)     Erectile dysfunction     Essential hypertension     Hydrocele     Hyperlipidemia 2016    Mitral valve disorders(424.0) 2014 mild MR     Obesity, unspecified 11/10/2014    Syncope and collapse 2014 likely vagal, post 1st dose hytrin 5/15 no recurrence        Family History   Problem Relation Age of Onset    Heart Attack Father 61    Heart Attack Brother 58    Prostate Cancer Other     Stroke Other     Hypertension Other        Social History     Tobacco Use    Smoking status: Former Smoker     Quit date: 1974     Years since quittin.0    Smokeless tobacco: Never Used   Substance Use Topics    Alcohol use: No    Drug use: No        Current Outpatient Medications   Medication Sig    finasteride (PROSCAR) 5 mg tablet take 1 tablet by mouth once daily    silodosin (Rapaflo) 8 mg capsule take 1 capsule by mouth once daily with BREAKFAST    furosemide (LASIX) 20 mg tablet take 1 tablet by mouth every MONDAY, WEDNESDAY AND SATURDAY    pravastatin (PRAVACHOL) 20 mg tablet take 1 tablet by mouth nightly    aspirin delayed-release 81 mg tablet Take  by mouth daily.  candesartan-hydroCHLOROthiazide (ATACAND HCT) 32-25 mg tab per tablet Take 1 Tab by mouth daily.  latanoprost (XALATAN) 0.005 % ophthalmic solution     fluticasone propionate (FLONASE) 50 mcg/actuation nasal spray instill 2 sprays into each nostril once daily    metoprolol tartrate (LOPRESSOR) 50 mg tablet take 1 tablet by mouth twice a day    metFORMIN (GLUCOPHAGE) 500 mg tablet Take  by mouth daily (with breakfast).  miscellaneous medical supply (T.E.D. ANTI-EMBOLISM STOCKING) misc 1 Each by Does Not Apply route daily.  montelukast (SINGULAIR) 10 mg tablet Take 10 mg by mouth daily.  amLODIPine (NORVASC) 2.5 mg tablet Take 1 Tablet by mouth daily. (Patient taking differently: Take 5 mg by mouth daily.)     No current facility-administered medications for this visit. Past Surgical History:   Procedure Laterality Date    HX CIRCUMCISION      HX HERNIA REPAIR Bilateral        Diagnostic Studies:  No flowsheet data found. Visit Vitals  BP (!) 153/81 (BP 1 Location: Left upper arm, BP Patient Position: Sitting, BP Cuff Size: Adult)   Pulse 62   Ht 5' 11\" (1.803 m)   Wt 100.7 kg (222 lb)   SpO2 98%   BMI 30.96 kg/m²       Mr. Jasper Epstein has a reminder for a \"due or due soon\" health maintenance. I have asked that he contact his primary care provider for follow-up on this health maintenance. Physical Exam  Constitutional:       General: He is not in acute distress. Appearance: He is well-developed. HENT:      Head: Normocephalic and atraumatic. Mouth/Throat:      Dentition: Normal dentition. Eyes:      General: No scleral icterus. Right eye: No discharge. Left eye: No discharge. Neck:      Thyroid: No thyromegaly. Vascular: No carotid bruit or JVD. Cardiovascular:      Rate and Rhythm: Normal rate and regular rhythm.       Pulses: Intact distal pulses. Heart sounds: Normal heart sounds, S1 normal and S2 normal. No murmur heard. No friction rub. No gallop. Pulmonary:      Effort: Pulmonary effort is normal.      Breath sounds: No wheezing or rales. Abdominal:      Palpations: Abdomen is soft. There is no mass. Tenderness: There is no abdominal tenderness. Musculoskeletal:      Cervical back: Neck supple. Right lower leg: Edema (1) present. Left lower leg: Edema (1) present. Lymphadenopathy:      Cervical:      Right cervical: No superficial cervical adenopathy. Left cervical: No superficial cervical adenopathy. Skin:     General: Skin is warm and dry. Findings: No rash. Neurological:      Mental Status: He is alert and oriented to person, place, and time. Psychiatric:         Behavior: Behavior normal.         ASSESSMENT and PLAN         1/21 shortness of breath is persisting but today has more edema and a few basal right lung rales. Add Lasix 3 times a week. Labs as ordered. Diet and exercise discussed. Recommended to reduce salty foods. He does not walk much due to the knee issues but I encouraged him to stay active as much as possible. 7/9/2021 CHF is compensated with mild edema. NYHA class II-III  Blood pressure is mildly elevated but patient not taking amlodipine as it makes his head feel funny. Reduce the dose to 2.5 mg a day. Requested that he check his blood pressure if he still feels funny in the head and call us. MR philippe. Follow clinically  Labs done at PCP. We will try to get those from them. Diet weight and exercise discussed. Diagnoses and all orders for this visit:    1. Essential hypertension, benign    2. Chronic diastolic heart failure (HCC)  -     furosemide (LASIX) 40 mg tablet; Take 1 Tablet by mouth Every Mon, Wed and Sat.  -     METABOLIC PANEL, BASIC; Future    3. Non-rheumatic mitral regurgitation    4. Pure hypercholesterolemia  -     LIPID PANEL;  Future  - HEPATIC FUNCTION PANEL; Future    5. Obesity (BMI 30.0-34. 9)        Pertinent laboratory and test data reviewed and discussed with patient. See patient instructions also for other medical advice given    Medications Discontinued During This Encounter   Medication Reason    furosemide (LASIX) 20 mg tablet        Follow-up and Dispositions    · Return in about 6 months (around 7/20/2022), or if symptoms worsen or fail to improve, for post test, BP log x 4-5 days post med changes. 1/20/2022 CHF is persisting NYHA class II-III. Blood pressure is elevated. Increase diuresis and follow-up electrolytes. MR stable. Follow clinically. Check lipids and continue statins. Patient not fully compliant with statins. Diet and exercise discussed. Mediterranean diet guidelines given.

## 2022-01-20 NOTE — PROGRESS NOTES
1. Have you been to the ER, urgent care clinic since your last visit? Hospitalized since your last visit? No    2. Have you seen or consulted any other health care providers outside of the [address] [place] [address] since your last visit? Include any pap smears or colon screening. Yes Where: Dr. Heather Bonilla PCP for follow-up     3. Since your last visit, have you had any of the following symptoms?      swelling in legs/arms. 4.  Have you had any blood work, X-rays or cardiac testing? No     Requested: NO     In [facility]: NO    5. Where do you normally have your labs drawn? [facility]    6. Do you need any refills today?    No

## 2022-01-20 NOTE — PATIENT INSTRUCTIONS
Medications Discontinued During This Encounter   Medication Reason    furosemide (LASIX) 20 mg tablet      After the recommended changes have been made in blood pressure medicines, patient advised to keep BP/HR(pulse rate) chart twice daily and bring us results in next 4 to 5 days. Patient may send the results via \"My Chart\" if desired. Please rest for 5-10 minutes before checking blood pressure. Sit on a comfortable chair without crossing the legs and put your arm on a table. We recommend that you use an upper arm cuff. Check the blood pressure 3 times each time you check the blood pressure and record the lowest reading. If you check the blood pressure in both arms, use the higher reading. Learning About the [address] [facility] Diet  What is the Mediterranean diet? The Mediterranean diet is a style of eating rather than a diet plan. It features foods eaten in [place], [place], [place] and [place], and other countries along the [facility]. It emphasizes eating foods like fish, fruits, vegetables, beans, high-fiber breads and whole grains, nuts, and olive oil. This style of eating includes limited red meat, cheese, and sweets. Why choose the Mediterranean diet? A Mediterranean-style diet may improve heart health. It contains more fat than other heart-healthy diets. But the fats are mainly from nuts, unsaturated oils (such as fish oils and olive oil), and certain nut or seed oils (such as canola, soybean, or flaxseed oil). These fats may help protect the heart and blood vessels. How can you get started on the Mediterranean diet? Here are some things you can do to switch to a more Mediterranean way of eating. What to eat  · Eat a variety of fruits and vegetables each day, such as grapes, blueberries, tomatoes, broccoli, peppers, figs, olives, spinach, eggplant, beans, lentils, and chickpeas.   · Eat a variety of whole-grain foods each day, such as oats, brown rice, and whole wheat bread, pasta, and couscous. · Eat fish at least 2 times a week. Try tuna, salmon, mackerel, lake trout, herring, or sardines. · Eat moderate amounts of low-fat dairy products, such as milk, cheese, or yogurt. · Eat moderate amounts of poultry and eggs. · Choose healthy (unsaturated) fats, such as nuts, olive oil, and certain nut or seed oils like canola, soybean, and flaxseed. · Limit unhealthy (saturated) fats, such as butter, palm oil, and coconut oil. And limit fats found in animal products, such as meat and dairy products made with whole milk. Try to eat red meat only a few times a month in very small amounts. · Limit sweets and desserts to only a few times a week. This includes sugar-sweetened drinks like soda. The Mediterranean diet may also include red wine with your meal--1 glass each day for women and up to 2 glasses a day for men. Tips for eating at home  · Use herbs, spices, garlic, lemon zest, and citrus juice instead of salt to add flavor to foods. · Add avocado slices to your sandwich instead of carranza. · Have fish for lunch or dinner instead of red meat. Brush the fish with olive oil, and broil or grill it. · Sprinkle your salad with seeds or nuts instead of cheese. · Cook with olive or canola oil instead of butter or oils that are high in saturated fat. · Switch from 2% milk or whole milk to 1% or fat-free milk. · Dip raw vegetables in a vinaigrette dressing or hummus instead of dips made from mayonnaise or sour cream.  · Have a piece of fruit for dessert instead of a piece of cake. Try baked apples, or have some dried fruit. Tips for eating out  · Try broiled, grilled, baked, or poached fish instead of having it fried or breaded. · Ask your  to have your meals prepared with olive oil instead of butter. · Order dishes made with marinara sauce or sauces made from olive oil. Avoid sauces made from cream or mayonnaise.   · Choose whole-grain breads, whole wheat pasta and pizza crust, brown rice, beans, and lentils. · Cut back on butter or margarine on bread. Instead, you can dip your bread in a small amount of olive oil. · Ask for a side salad or grilled vegetables instead of french fries or chips. Where can you learn more? Go to http://www.saavedra.com/  Enter O407 in the search box to learn more about \"Learning About the Mediterranean Diet. \"  Current as of: December 17, 2020               Content Version: 13.0  © 2006-2021 [employer]. Care instructions adapted under license by [employer] (which disclaims liability or warranty for this information). If you have questions about a medical condition or this instruction, always ask your healthcare professional. [address] any warranty or liability for your use of this information.

## 2022-02-01 DIAGNOSIS — I10 ESSENTIAL HYPERTENSION, BENIGN: ICD-10-CM

## 2022-02-01 RX ORDER — PRAVASTATIN SODIUM 20 MG/1
TABLET ORAL
Qty: 90 TABLET | Refills: 2 | Status: SHIPPED | OUTPATIENT
Start: 2022-02-01

## 2022-02-11 ENCOUNTER — TELEPHONE (OUTPATIENT)
Dept: CARDIOLOGY CLINIC | Age: 82
End: 2022-02-11

## 2022-02-11 DIAGNOSIS — I50.32 CHRONIC DIASTOLIC HEART FAILURE (HCC): Primary | ICD-10-CM

## 2022-02-11 NOTE — TELEPHONE ENCOUNTER
Paper message per Marianela Brooke NP BP log: BP elevated. Increase Amlodipine to 5 mg a day- has edema improved? BMP/ 1 wk home chart. Patient states edema has improved and He voices understanding and acceptance of this advice and will call back if any further questions or concerns.

## 2022-02-22 DIAGNOSIS — M17.12 PRIMARY OSTEOARTHRITIS OF LEFT KNEE: Primary | ICD-10-CM

## 2022-02-22 DIAGNOSIS — G62.9 PERIPHERAL POLYNEUROPATHY: ICD-10-CM

## 2022-02-28 ENCOUNTER — TELEPHONE (OUTPATIENT)
Dept: CARDIOLOGY CLINIC | Age: 82
End: 2022-02-28

## 2022-02-28 NOTE — TELEPHONE ENCOUNTER
BP log per Dr. Rudy Tobin to increase norvasc to 10 mg an day. Chart BP x1 week. He voices understanding and acceptance of this advice and will call back if any further questions or concerns.

## 2022-03-01 ENCOUNTER — DOCUMENTATION ONLY (OUTPATIENT)
Dept: ORTHOPEDIC SURGERY | Age: 82
End: 2022-03-01

## 2022-03-19 PROBLEM — N28.1 RENAL CYST: Status: ACTIVE | Noted: 2019-10-31

## 2022-03-19 PROBLEM — R79.89 ELEVATED LFTS: Status: ACTIVE | Noted: 2019-01-11

## 2022-03-19 PROBLEM — E78.00 PURE HYPERCHOLESTEROLEMIA: Status: ACTIVE | Noted: 2020-07-09

## 2022-03-28 ENCOUNTER — TELEPHONE (OUTPATIENT)
Dept: CARDIOLOGY CLINIC | Age: 82
End: 2022-03-28

## 2022-03-28 DIAGNOSIS — I10 ESSENTIAL HYPERTENSION, BENIGN: Primary | ICD-10-CM

## 2022-03-28 RX ORDER — LOSARTAN POTASSIUM 25 MG/1
25 TABLET ORAL DAILY
Qty: 30 TABLET | Refills: 3 | OUTPATIENT
Start: 2022-03-28

## 2022-03-28 RX ORDER — AMLODIPINE BESYLATE 10 MG/1
10 TABLET ORAL DAILY
COMMUNITY
End: 2022-07-21 | Stop reason: ALTCHOICE

## 2022-03-28 NOTE — TELEPHONE ENCOUNTER
Spoke with patient per Dr. George Choudhury regarding BP log. Add Losartan 25 mg a day. BP chart / BMP call if dizzy. He voices understanding and acceptance of this advice and will call back if any further questions or concerns.

## 2022-03-28 NOTE — TELEPHONE ENCOUNTER
Requested Prescriptions     Pending Prescriptions Disp Refills    losartan (COZAAR) 25 mg tablet 30 Tablet 3     Sig: Take 1 Tablet by mouth daily.

## 2022-03-28 NOTE — TELEPHONE ENCOUNTER
Called patient per Dr. Remedios Garcia, since patient is on Atacand no need for losartan. Continue the present medications and tell him to send me blood pressure readings again after another 15-20 days.  If any questions to call office

## 2022-03-28 NOTE — TELEPHONE ENCOUNTER
Since patient is on Atacand, there is no need for losartan. Continue the present medications and tell him to send me the blood pressure readings again after another 15-20 days.

## 2022-05-17 ENCOUNTER — OFFICE VISIT (OUTPATIENT)
Dept: ORTHOPEDIC SURGERY | Age: 82
End: 2022-05-17
Payer: MEDICARE

## 2022-05-17 VITALS — WEIGHT: 222 LBS | BODY MASS INDEX: 31.08 KG/M2 | HEIGHT: 71 IN | TEMPERATURE: 97.8 F

## 2022-05-17 DIAGNOSIS — M17.12 PRIMARY OSTEOARTHRITIS OF LEFT KNEE: Primary | ICD-10-CM

## 2022-05-17 PROCEDURE — 20611 DRAIN/INJ JOINT/BURSA W/US: CPT | Performed by: ORTHOPAEDIC SURGERY

## 2022-05-17 NOTE — PROGRESS NOTES
Patient: Idalia Urbina                MRN: [id number]       SSN: [id number]  YOB: 1940        AGE: 79 y.o. SEX: male  Body mass index is 30.96 kg/m². PCP: Lorene Ganser, MD  05/17/22    Chief Complaint   Patient presents with    Knee Pain     bilateral        HISTORY:  Idalia Urbina is a 79 y.o. male who is seen for reevaluation of Left knee and here for 1st injection of Orthovisc. PROCEDURE:  Under ultrasound guidance, patient's Left knee, after timeout under sterile conditions, was injected with 2 cc of Orthovisc. Intra-articular. Ultrasound images captured using [employer] Ultrasound machine using a frequency of 10 MHz with a linear transducer and scanned into patient's chart. [facility]  OFFICE PROCEDURE PROGRESS NOTE        Chart reviewed for the following:   Lauryn Shore MD, have reviewed the History, Physical and updated the Allergic reactions for [address] performed immediately prior to start of procedure:   Lauryn Shore MD, have performed the following reviews on Idalia Urbina prior to the start of the procedure:            * Patient was identified by name and date of birth   * Agreement on procedure being performed was verified  * Risks and Benefits explained to the patient  * Procedure site verified and marked as necessary  * Patient was positioned for comfort  * Needle placement confirmed by ultrasound  * Consent was signed and verified     Time: 2:39 PM       Date of procedure: 5/17/2022    Procedure performed by:  Corazon Vaca MD    Provider assisted by: None     How tolerated by patient: tolerated the procedure well with no complications    Comments: none    IMPRESSION:     ICD-10-CM ICD-9-CM    1. Primary osteoarthritis of left knee  M17.12 715.16         PLAN:  Mr. Nina Bob will return in one week for his second Orthovisc injection.       Scribed by Reina Whitlock (Tu [employer]) as dictated by MD DEXTER Vidal, Dr. More Moffett, confirm that all documentation is accurate.     More Moffett M.D.   [facility]

## 2022-05-24 ENCOUNTER — OFFICE VISIT (OUTPATIENT)
Dept: ORTHOPEDIC SURGERY | Age: 82
End: 2022-05-24
Payer: MEDICARE

## 2022-05-24 VITALS
TEMPERATURE: 98 F | HEART RATE: 70 BPM | HEIGHT: 71 IN | OXYGEN SATURATION: 98 % | BODY MASS INDEX: 31.64 KG/M2 | WEIGHT: 226 LBS

## 2022-05-24 DIAGNOSIS — M17.12 PRIMARY OSTEOARTHRITIS OF LEFT KNEE: Primary | ICD-10-CM

## 2022-05-24 DIAGNOSIS — M17.11 PRIMARY OSTEOARTHRITIS OF RIGHT KNEE: ICD-10-CM

## 2022-05-24 PROCEDURE — G8432 DEP SCR NOT DOC, RNG: HCPCS | Performed by: ORTHOPAEDIC SURGERY

## 2022-05-24 PROCEDURE — 73560 X-RAY EXAM OF KNEE 1 OR 2: CPT | Performed by: ORTHOPAEDIC SURGERY

## 2022-05-24 PROCEDURE — 99214 OFFICE O/P EST MOD 30 MIN: CPT | Performed by: ORTHOPAEDIC SURGERY

## 2022-05-24 PROCEDURE — 20611 DRAIN/INJ JOINT/BURSA W/US: CPT | Performed by: ORTHOPAEDIC SURGERY

## 2022-05-24 PROCEDURE — G8417 CALC BMI ABV UP PARAM F/U: HCPCS | Performed by: ORTHOPAEDIC SURGERY

## 2022-05-24 PROCEDURE — 1101F PT FALLS ASSESS-DOCD LE1/YR: CPT | Performed by: ORTHOPAEDIC SURGERY

## 2022-05-24 PROCEDURE — G8756 NO BP MEASURE DOC: HCPCS | Performed by: ORTHOPAEDIC SURGERY

## 2022-05-24 PROCEDURE — G8536 NO DOC ELDER MAL SCRN: HCPCS | Performed by: ORTHOPAEDIC SURGERY

## 2022-05-24 PROCEDURE — G8427 DOCREV CUR MEDS BY ELIG CLIN: HCPCS | Performed by: ORTHOPAEDIC SURGERY

## 2022-05-24 NOTE — PROGRESS NOTES
Francois Bellamy  1940   Chief Complaint   Patient presents with    Knee Pain     LEFT        HISTORY OF PRESENT ILLNESS  Francois Bellamy is a 79 y.o. male who presents today for evaluation of left knee. On 12/22 he received a cortisone injection from Dr. Salvador Davis. Today he states he is no better. He states that he has had a series of injections in the past and that seemed to work better for him. . Pain is a 8/10. Patient denies any fever, chills, chest pain, shortness of breath or calf pain. The remainder of the review of systems is negative. There are no new illness or injuries to report since last seen in the office. No changes in medications, allergies, social or family history. PHYSICAL EXAM:   Visit Vitals  Pulse 70   Temp 98 °F (36.7 °C)   Ht 5' 11\" (1.803 m)   Wt 226 lb (102.5 kg)   SpO2 98%   BMI 31.52 kg/m²     The patient is a well-developed, well-nourished male   in no acute distress. The patient is alert and oriented times three. The patient is alert and oriented times three. Mood and affect are normal.  LYMPHATIC: lymph nodes are not enlarged and are within normal limits  SKIN: normal in color and non tender to palpation. There are no bruises or abrasions noted. NEUROLOGICAL: Motor sensory exam is within normal limits. Reflexes are equal bilaterally.  There is normal sensation to pinprick and light touch  MUSCULOSKELETAL:  Examination Left knee   Skin Intact   Range of motion 0-130   Effusion +   Medial joint line tenderness +   Lateral joint line tenderness +   Tenderness Pes Bursa -   Tenderness insertion MCL -   Tenderness insertion LCL -   Marlens -   Patella crepitus +   Patella grind +   Lachman -   Pivot shift -   Anterior drawer -   Posterior drawer -   Varus stress -   Valgus stress -   Neurovascular Intact   Calf Swelling and Tenderness to Palpation -   Elio's Test -   Hamstring Cord Tightness -      Examination Right knee   Skin Intact   Range of motion 0-130 Effusion +   Medial joint line tenderness +   Lateral joint line tenderness -   Tenderness Pes Bursa -   Tenderness insertion MCL -   Tenderness insertion LCL -   Marlens -   Patella crepitus +   Patella grind +   Lachman -   Pivot shift -   Anterior drawer -   Posterior drawer -   Varus stress -   Valgus stress -   Neurovascular Intact   Calf Swelling and Tenderness to Palpation -   Elio's Test -   Hamstring Cord Tightness -     PROCEDURE:   Left knee Injection with Ultrasound Guidance    Indication:Left Knee pain/swelling    After sterile prep, 2 cc orthovisc  were injected into the left Knee. Intra-articular Ultrasound images captured using [address] Ultrasound machine using a frequency of 10 MHz with a linear transducer and scanned into patient's chart.            [facility] - [facility]  OFFICE PROCEDURE PROGRESS NOTE        Chart reviewed for the following:  Mathew Chavira M.D, have reviewed the History, Physical and updated the Allergic reactions for [address] performed immediately prior to start of procedure:  Mathew Chavira M.D, have performed the following reviews on Hyla Proud prior to the start of the procedure:            * Patient was identified by name and date of birth   * Agreement on procedure being performed was verified  * Risks and Benefits explained to the patient  * Procedure site verified and marked as necessary  * Patient was positioned for comfort  * Needle placement confirmed by ultrasound  * Consent was signed and verified     Time: 10:57 AM     Date of procedure: 5/24/2022    Procedure performed by:  Denisa Snow M.D    Provider assisted by: (see medication administration)    How tolerated by patient: tolerated the procedure well with no complications    Comments: none    IMAGING: XR of the right knee with 2 views obtained in the office dated 5/24/2022 was reviewed and read by Dr. Steven Francis: moderate decreased joint space on the medial compartment and patellofemoral joint     XR of the left knee with 2 views obtained in the office dated 12/22/2021 was reviewed and read by Dr. Shawn Samuels: slight decreased joint space on the medial side     IMPRESSION:      ICD-10-CM ICD-9-CM    1. Primary osteoarthritis of left knee  M17.12 715.16 ARTHROCENTESIS ASPIR&/INJ MAJOR JT/BURSA W/US      sodium hyaluronate (viscosup) (ORTHOVISC) 30 mg/2 mL injection syrg 30 mg      PROCEDURE AUTHORIZATION TO       AMB POC XRAY, KNEE; 1/2 VIEWS   2. Primary osteoarthritis of right knee  M17.11 715.16 ARTHROCENTESIS ASPIR&/INJ MAJOR JT/BURSA W/US      sodium hyaluronate (viscosup) (ORTHOVISC) 30 mg/2 mL injection syrg 30 mg      PROCEDURE AUTHORIZATION TO       AMB POC XRAY, KNEE; 1/2 VIEWS        PLAN:  1. Pt presents today with b/l knee pain and is here for a left knee Orthovisc injection. He is also having right knee pain due to exacerbation of degenerative arthritis with joint space narrowing and I would like to proceed with visco-supplementation authorization. Return next week for 3rd orthovisc   Risk factors include: BMI>30, htn  2. No ultrasound exam indicated today  3. No cortisone injection indicated today  4. No Physical/Occupational Therapy indicated today  5. No diagnostic test indicated today:   6. No durable medical equipment indicated today  7. No referral indicated today   8. No medications indicated today:   9. No Narcotic indicated today     RTC one week for 3rd orthovisc left knee     Scribed by Ruddy Robbins[address] [place] [address] [address]) as dictated by Silviano Trujillo MD    I, Dr. Silviano Trujillo, confirm that all documentation is accurate.     Silviano Trujillo M.D.   [facility]

## 2022-05-31 ENCOUNTER — OFFICE VISIT (OUTPATIENT)
Dept: ORTHOPEDIC SURGERY | Age: 82
End: 2022-05-31
Payer: MEDICARE

## 2022-05-31 VITALS — BODY MASS INDEX: 31.64 KG/M2 | TEMPERATURE: 97.3 F | WEIGHT: 226 LBS | HEIGHT: 71 IN

## 2022-05-31 DIAGNOSIS — M17.12 PRIMARY OSTEOARTHRITIS OF LEFT KNEE: Primary | ICD-10-CM

## 2022-05-31 PROCEDURE — 20611 DRAIN/INJ JOINT/BURSA W/US: CPT | Performed by: ORTHOPAEDIC SURGERY

## 2022-05-31 NOTE — PROGRESS NOTES
Patient: Elisa Ratliff                MRN: [id number]       SSN: [id number]  YOB: 1940        AGE: 79 y.o. SEX: male  Body mass index is 31.52 kg/m². PCP: Jess Greenfield MD  05/31/22    Chief Complaint   Patient presents with    Knee Pain     lt        HISTORY:  Elisa Ratliff is a 79 y.o. male who is seen for reevaluation of Left knee and here for 3rd injection of Orthovisc. PROCEDURE:  Under ultrasound guidance, patient's Left knee, after timeout under sterile conditions, was injected with 2 cc of Orthovisc. Intra-articular. Ultrasound images captured using [address] Ultrasound machine using a frequency of 10 MHz with a linear transducer and scanned into patient's chart. [facility]  OFFICE PROCEDURE PROGRESS NOTE        Chart reviewed for the following:   Celina Bhatia MD, have reviewed the History, Physical and updated the Allergic reactions for [address] performed immediately prior to start of procedure:   Celina Bhatia MD, have performed the following reviews on Elisa Ratliff prior to the start of the procedure:            * Patient was identified by name and date of birth   * Agreement on procedure being performed was verified  * Risks and Benefits explained to the patient  * Procedure site verified and marked as necessary  * Patient was positioned for comfort  * Needle placement confirmed by ultrasound  * Consent was signed and verified     Time: 10:48 AM       Date of procedure: 5/31/2022    Procedure performed by:  Linn Ramsey MD    Provider assisted by: None     How tolerated by patient: tolerated the procedure well with no complications    Comments: none    IMPRESSION:     ICD-10-CM ICD-9-CM    1.  Primary osteoarthritis of left knee  M17.12 715.16 ARTHROCENTESIS ASPIR&/INJ MAJOR JT/BURSA W/US      sodium hyaluronate (viscosup) (ORTHOVISC) 30 mg/2 mL injection syrg 30 mg      REFERRAL TO HOME HEALTH        PLAN: Mr. Sarita Borrego will return in one week for his final Orthovisc injection. Scribed by Mary Patel (Moshe Zhang) as dictated by MD DEXTER Matthews, Dr. Jamie Mcdowell, confirm that all documentation is accurate.     Jamie Mcdowell M.D.   [facility]

## 2022-06-01 ENCOUNTER — OFFICE VISIT (OUTPATIENT)
Dept: CARDIOLOGY CLINIC | Age: 82
End: 2022-06-01
Payer: MEDICARE

## 2022-06-01 VITALS
HEART RATE: 68 BPM | SYSTOLIC BLOOD PRESSURE: 137 MMHG | BODY MASS INDEX: 31.22 KG/M2 | HEIGHT: 71 IN | WEIGHT: 223 LBS | DIASTOLIC BLOOD PRESSURE: 67 MMHG | OXYGEN SATURATION: 97 %

## 2022-06-01 DIAGNOSIS — E78.00 PURE HYPERCHOLESTEROLEMIA: ICD-10-CM

## 2022-06-01 DIAGNOSIS — I10 ESSENTIAL HYPERTENSION, BENIGN: ICD-10-CM

## 2022-06-01 DIAGNOSIS — I50.32 CHRONIC DIASTOLIC HEART FAILURE (HCC): Primary | ICD-10-CM

## 2022-06-01 DIAGNOSIS — E66.9 OBESITY (BMI 30.0-34.9): ICD-10-CM

## 2022-06-01 DIAGNOSIS — I34.0 NON-RHEUMATIC MITRAL REGURGITATION: ICD-10-CM

## 2022-06-01 PROCEDURE — 1101F PT FALLS ASSESS-DOCD LE1/YR: CPT | Performed by: INTERNAL MEDICINE

## 2022-06-01 PROCEDURE — G8432 DEP SCR NOT DOC, RNG: HCPCS | Performed by: INTERNAL MEDICINE

## 2022-06-01 PROCEDURE — 1123F ACP DISCUSS/DSCN MKR DOCD: CPT | Performed by: INTERNAL MEDICINE

## 2022-06-01 PROCEDURE — G8417 CALC BMI ABV UP PARAM F/U: HCPCS | Performed by: INTERNAL MEDICINE

## 2022-06-01 PROCEDURE — G8427 DOCREV CUR MEDS BY ELIG CLIN: HCPCS | Performed by: INTERNAL MEDICINE

## 2022-06-01 PROCEDURE — 99214 OFFICE O/P EST MOD 30 MIN: CPT | Performed by: INTERNAL MEDICINE

## 2022-06-01 PROCEDURE — G8752 SYS BP LESS 140: HCPCS | Performed by: INTERNAL MEDICINE

## 2022-06-01 PROCEDURE — G8536 NO DOC ELDER MAL SCRN: HCPCS | Performed by: INTERNAL MEDICINE

## 2022-06-01 PROCEDURE — G8754 DIAS BP LESS 90: HCPCS | Performed by: INTERNAL MEDICINE

## 2022-06-01 RX ORDER — FUROSEMIDE 40 MG/1
40 TABLET ORAL DAILY
Qty: 90 TABLET | Refills: 1 | Status: SHIPPED | OUTPATIENT
Start: 2022-06-01

## 2022-06-01 NOTE — PATIENT INSTRUCTIONS
Learning About the [address] [facility] Diet  What is the Mediterranean diet? The Mediterranean diet is a style of eating rather than a diet plan. It features foods eaten in [place], [place], [place] and [place], and other countries along the [facility]. It emphasizes eating foods like fish, fruits, vegetables, beans, high-fiber breads and whole grains, nuts, and olive oil. This style of eating includes limited red meat, cheese, and sweets. Why choose the Mediterranean diet? A Mediterranean-style diet may improve heart health. It contains more fat than other heart-healthy diets. But the fats are mainly from nuts, unsaturated oils (such as fish oils and olive oil), and certain nut or seed oils (such as canola, soybean, or flaxseed oil). These fats may help protect the heart and blood vessels. How can you get started on the Mediterranean diet? Here are some things you can do to switch to a more Mediterranean way of eating. What to eat  · Eat a variety of fruits and vegetables each day, such as grapes, blueberries, tomatoes, broccoli, peppers, figs, olives, spinach, eggplant, beans, lentils, and chickpeas. · Eat a variety of whole-grain foods each day, such as oats, brown rice, and whole wheat bread, pasta, and couscous. · Eat fish at least 2 times a week. Try tuna, salmon, mackerel, lake trout, herring, or sardines. · Eat moderate amounts of low-fat dairy products, such as milk, cheese, or yogurt. · Eat moderate amounts of poultry and eggs. · Choose healthy (unsaturated) fats, such as nuts, olive oil, and certain nut or seed oils like canola, soybean, and flaxseed. · Limit unhealthy (saturated) fats, such as butter, palm oil, and coconut oil. And limit fats found in animal products, such as meat and dairy products made with whole milk. Try to eat red meat only a few times a month in very small amounts. · Limit sweets and desserts to only a few times a week.  This includes sugar-sweetened drinks like soda. The Mediterranean diet may also include red wine with your meal--1 glass each day for women and up to 2 glasses a day for men. Tips for eating at home  · Use herbs, spices, garlic, lemon zest, and citrus juice instead of salt to add flavor to foods. · Add avocado slices to your sandwich instead of craranza. · Have fish for lunch or dinner instead of red meat. Brush the fish with olive oil, and broil or grill it. · Sprinkle your salad with seeds or nuts instead of cheese. · Cook with olive or canola oil instead of butter or oils that are high in saturated fat. · Switch from 2% milk or whole milk to 1% or fat-free milk. · Dip raw vegetables in a vinaigrette dressing or hummus instead of dips made from mayonnaise or sour cream.  · Have a piece of fruit for dessert instead of a piece of cake. Try baked apples, or have some dried fruit. Tips for eating out  · Try broiled, grilled, baked, or poached fish instead of having it fried or breaded. · Ask your  to have your meals prepared with olive oil instead of butter. · Order dishes made with marinara sauce or sauces made from olive oil. Avoid sauces made from cream or mayonnaise. · Choose whole-grain breads, whole wheat pasta and pizza crust, brown rice, beans, and lentils. · Cut back on butter or margarine on bread. Instead, you can dip your bread in a small amount of olive oil. · Ask for a side salad or grilled vegetables instead of french fries or chips. Where can you learn more? Go to http://www.saavedra.com/  Enter O407 in the search box to learn more about \"Learning About the Mediterranean Diet. \"  Current as of: September 8, 2021               Content Version: 13.2  © 1247-1009 [employer]. Care instructions adapted under license by [employer] (which disclaims liability or warranty for this information).  If you have questions about a medical condition or this instruction, always ask your healthcare professional. [address] any warranty or liability for your use of this information.

## 2022-06-01 NOTE — PROGRESS NOTES
HISTORY OF PRESENT ILLNESS  Arcelia Raines is a 79 y.o. male. Follow-up of CHF, hypertension, hyperlipidemia, MR       5/16 restarted diovan and d/og lisinopril  7/2019 - Stopped taking Atorvastatin due to myalgias  7/21 does not take amlodipine as it makes his head feel funny. Pravastatin causing cramps in the calves. Syncope 3-4 hrs post 1st dose of hytrin in 1/14, gradual fall with mild sweating, urinary incontinence, no vomiting  Similar episode in 2005 approx  No recurrence since not taking hytrin in 1/14 1/22 tolerating amlodipine well now. Says that BP better at home. Follow-up  Associated symptoms include shortness of breath. Pertinent negatives include no chest pain and no headaches. Shortness of Breath  The history is provided by the patient. This is a new problem. The current episode started more than 1 week ago (few yrs, mainly fatigue). The problem has not changed since onset. Associated symptoms include leg swelling. Pertinent negatives include no fever, no headaches, no cough, no wheezing, no PND, no orthopnea, no chest pain, no vomiting, no rash and no claudication. The problem's precipitants include exercise (weed eating, rushing to walk; 11/15 cutting grass, weed eating; 1/21 not much active at all due to knees, mail box; 6/22 limited due to claudication;). Leg Swelling  The history is provided by the patient. This is a chronic problem. The current episode started more than 1 week ago. The problem occurs daily. The problem has not changed since onset. Associated symptoms include shortness of breath. Pertinent negatives include no chest pain and no headaches. The symptoms are aggravated by standing. The symptoms are relieved by sleep. Review of Systems   Constitutional: Negative for chills, fever, malaise/fatigue and weight loss. HENT: Negative for nosebleeds. Eyes: Negative for discharge. Respiratory: Positive for shortness of breath. Negative for cough and wheezing. Cardiovascular: Positive for leg swelling. Negative for chest pain, palpitations, orthopnea, claudication and PND. Gastrointestinal: Negative for diarrhea, nausea and vomiting. Genitourinary: Negative for dysuria and hematuria. Musculoskeletal: Negative for joint pain. Skin: Negative for rash. Neurological: Negative for dizziness, seizures, loss of consciousness and headaches. Endo/Heme/Allergies: Negative for polydipsia. Does not bruise/bleed easily. Psychiatric/Behavioral: Negative for depression and substance abuse. The patient does not have insomnia. Allergies   Allergen Reactions    Amlodipine Besylate Swelling    Atorvastatin Calcium Unknown (comments)    Tramadol Unknown (comments)       Past Medical History:   Diagnosis Date    Allergic sinusitis     BPH (benign prostatic hyperplasia)     Chronic diastolic heart failure (HCC) 2014    Congestive heart failure, unspecified     DJD (degenerative joint disease)     Erectile dysfunction     Essential hypertension     Hydrocele     Hyperlipidemia 2016    Mitral valve disorders(424.0) 2014 mild MR     Obesity, unspecified 11/10/2014    Syncope and collapse 2014 likely vagal, post 1st dose hytrin 5/15 no recurrence        Family History   Problem Relation Age of Onset    Heart Attack Father 61    Heart Attack Brother 58    Prostate Cancer Other     Stroke Other     Hypertension Other        Social History     Tobacco Use    Smoking status: Former Smoker     Quit date: 1974     Years since quittin.4    Smokeless tobacco: Never Used   Substance Use Topics    Alcohol use: No    Drug use: No        Current Outpatient Medications   Medication Sig    amLODIPine (NORVASC) 10 mg tablet Take 10 mg by mouth daily.  pravastatin (PRAVACHOL) 20 mg tablet take 1 tablet by mouth nightly    furosemide (LASIX) 40 mg tablet Take 1 Tablet by mouth Every Mon, Wed and Sat.     finasteride (PROSCAR) 5 mg tablet take 1 tablet by mouth once daily    silodosin (Rapaflo) 8 mg capsule take 1 capsule by mouth once daily with BREAKFAST    aspirin delayed-release 81 mg tablet Take  by mouth daily.  candesartan-hydroCHLOROthiazide (ATACAND HCT) 32-25 mg tab per tablet Take 1 Tab by mouth daily.  latanoprost (XALATAN) 0.005 % ophthalmic solution     fluticasone propionate (FLONASE) 50 mcg/actuation nasal spray instill 2 sprays into each nostril once daily    metoprolol tartrate (LOPRESSOR) 50 mg tablet take 1 tablet by mouth twice a day    metFORMIN (GLUCOPHAGE) 500 mg tablet Take  by mouth daily (with breakfast).  miscellaneous medical supply (T.E.D. ANTI-EMBOLISM STOCKING) misc 1 Each by Does Not Apply route daily.  montelukast (SINGULAIR) 10 mg tablet Take 10 mg by mouth daily. No current facility-administered medications for this visit. Past Surgical History:   Procedure Laterality Date    HX CIRCUMCISION      HX HERNIA REPAIR Bilateral        Diagnostic Studies:  No flowsheet data found. Visit Vitals  /67 (BP 1 Location: Left upper arm, BP Patient Position: Sitting, BP Cuff Size: Adult)   Pulse 68   Ht 5' 11\" (1.803 m)   Wt 101.2 kg (223 lb)   SpO2 97%   BMI 31.10 kg/m²       Mr. Bev Fairchild has a reminder for a \"due or due soon\" health maintenance. I have asked that he contact his primary care provider for follow-up on this health maintenance. Physical Exam  Constitutional:       General: He is not in acute distress. Appearance: He is well-developed. HENT:      Head: Normocephalic and atraumatic. Mouth/Throat:      Dentition: Normal dentition. Eyes:      General: No scleral icterus. Right eye: No discharge. Left eye: No discharge. Neck:      Thyroid: No thyromegaly. Vascular: No carotid bruit or JVD. Cardiovascular:      Rate and Rhythm: Normal rate and regular rhythm. Pulses: Intact distal pulses.       Heart sounds: Normal heart sounds, S1 normal and S2 normal. No murmur heard. No friction rub. No gallop. Pulmonary:      Effort: Pulmonary effort is normal.      Breath sounds: No wheezing or rales. Abdominal:      Palpations: Abdomen is soft. There is no mass. Tenderness: There is no abdominal tenderness. Musculoskeletal:      Cervical back: Neck supple. Right lower leg: Edema (1) present. Left lower leg: Edema (1) present. Lymphadenopathy:      Cervical:      Right cervical: No superficial cervical adenopathy. Left cervical: No superficial cervical adenopathy. Skin:     General: Skin is warm and dry. Findings: No rash. Neurological:      Mental Status: He is alert and oriented to person, place, and time. Psychiatric:         Behavior: Behavior normal.         ASSESSMENT and PLAN         1/21 shortness of breath is persisting but today has more edema and a few basal right lung rales. Add Lasix 3 times a week. Labs as ordered. Diet and exercise discussed. Recommended to reduce salty foods. He does not walk much due to the knee issues but I encouraged him to stay active as much as possible. 7/9/2021 CHF is compensated with mild edema. NYHA class II-III  Blood pressure is mildly elevated but patient not taking amlodipine as it makes his head feel funny. Reduce the dose to 2.5 mg a day. Requested that he check his blood pressure if he still feels funny in the head and call us. MR stable. Follow clinically  Labs done at PCP. We will try to get those from them. Diet weight and exercise discussed. 1/20/2022 CHF is persisting NYHA class II-III. Blood pressure is elevated. Increase diuresis and follow-up electrolytes. MR stable. Follow clinically. Check lipids and continue statins. Patient not fully compliant with statins. Diet and exercise discussed. Mediterranean diet guidelines given. Diagnoses and all orders for this visit:    1.  Chronic diastolic heart failure (HCC)  - furosemide (LASIX) 40 mg tablet; Take 1 Tablet by mouth daily.  -     METABOLIC PANEL, BASIC; Future  -     ECHO ADULT COMPLETE; Future    2. Essential hypertension, benign  -     METABOLIC PANEL, BASIC; Future    3. Non-rheumatic mitral regurgitation  -     ECHO ADULT COMPLETE; Future    4. Pure hypercholesterolemia  -     LIPID PANEL; Future  -     HEPATIC FUNCTION PANEL; Future    5. Obesity (BMI 30.0-34. 9)        Pertinent laboratory and test data reviewed and discussed with patient. See patient instructions also for other medical advice given    Medications Discontinued During This Encounter   Medication Reason    furosemide (LASIX) 40 mg tablet        Follow-up and Dispositions    · Return in about 3 months (around 9/1/2022), or if symptoms worsen or fail to improve, for Get angio report from Dr. Oanh Hartmann, post test.       6/1/2022 CHF is persisting with significant edema in the lower legs. Blood pressure is controlled. MR is minimal but will follow-up in echo. Lipids need to be followed up. Follow-up echo to check the LV function and MR. Diet and weight discussed. Mediterranean diet guidelines given.

## 2022-06-01 NOTE — PROGRESS NOTES
1. Have you been to the ER, urgent care clinic since your last visit? Hospitalized since your last visit? Yes, Urgent Care     2. Have you seen or consulted any other health care providers outside of the [address] [place] [address] since your last visit? Include any pap smears or colon screening. No     3. Since your last visit, have you had any of the following symptoms? chest pains and swelling in legs/arms. 4.  Have you had any blood work, X-rays or cardiac testing? Yes Where: [facility]     Requested: YES     In [facility]: YES    5. Where do you normally have your labs drawn? [facility]    6. Do you need any refills today?    No

## 2022-06-02 ENCOUNTER — HOME HEALTH ADMISSION (OUTPATIENT)
Dept: HOME HEALTH SERVICES | Facility: HOME HEALTH | Age: 82
End: 2022-06-02
Payer: MEDICARE

## 2022-06-05 ENCOUNTER — HOME CARE VISIT (OUTPATIENT)
Dept: SCHEDULING | Facility: HOME HEALTH | Age: 82
End: 2022-06-05
Payer: MEDICARE

## 2022-06-05 PROCEDURE — 3331090002 HH PPS REVENUE DEBIT

## 2022-06-05 PROCEDURE — 400018 HH-NO PAY CLAIM PROCEDURE

## 2022-06-05 PROCEDURE — 400013 HH SOC

## 2022-06-05 PROCEDURE — 3331090001 HH PPS REVENUE CREDIT

## 2022-06-05 PROCEDURE — G0299 HHS/HOSPICE OF RN EA 15 MIN: HCPCS

## 2022-06-06 ENCOUNTER — HOME CARE VISIT (OUTPATIENT)
Dept: HOME HEALTH SERVICES | Facility: HOME HEALTH | Age: 82
End: 2022-06-06
Payer: MEDICARE

## 2022-06-06 PROCEDURE — 3331090001 HH PPS REVENUE CREDIT

## 2022-06-06 PROCEDURE — 3331090002 HH PPS REVENUE DEBIT

## 2022-06-06 NOTE — HOME HEALTH
Skilled Reason for admission/summary of clinical condition:  79-year old male who was recently seen by MD regarding osteoarthritis of left knee-states he has been getting the cortisone/gel injections and having another one at upcoming appointment. Patient/caregivers knowledgeable regarding chronic disease processes and medications at this time-discussed medications, disease processes, safety, fall precautions with verbal understanding. Patient/caregivers agreeable that skilled nursing is not required at this time, agreed to [facility] for assistance with ADL's, bathing. PT/OT to evaluate and treat. This patient is homebound for the following reasons Requires considerable and taxing effort to leave the home , Requires the assistance of 1 or more persons to leave the home  and Only leaves the home for medical reasons or [employer] services and are infrequent and of short duration for other reasons . Caregiver: spouse. Caregiver assists with ADL's, medications, meal preparation, transportation to MD appointments. Medications reconciled and all medications are available in the home this visit. The following education was provided regarding medications: side effects, dosages, purposes, frequencies  Medications are somewhat effective at this time. High risk medication teaching regarding anticoagulants, hyperglycemic agents or opiod narcotics performed (specify) Hypoglycemics (Metformin)-take only as prescribed, follow diabetic diet, monitor blood sugrs, monitor for signs of hypoglycemia. Home health supplies by type and quantity ordered/delivered this visit include: None needed. Patient education provided this visit to include:  INSTRUCTED PATIENT AND CG THAT SHOULD ANY NEEDS OR CONCERNS ARISE TO FIRST CALL OUR OFFICE, OR THE DR'S OFFICE  OR GO TO AN URGENT CARE CENTER AND NOT TO THE ED FOR NON-LIFE THREATENING EVENTS. IF IT IS LIFE THREATENING THEN CALL 911 OR GO TO THE CLOSEST ER.   Patient is a fall risk. Educated pateint to sit on the side of the chair/bed, take a slow deep breaths, have feet firmly planted before standing up, use cane/walker if available, or have someone to assist.  Pt/cg aware of disease process, s/s to monitor for, who to report to/when.  patient instructed to maintain clear pathways in home and to minimize clutter to prevent falls from occurring/minimize fall potential.  reviewed cardiac diet- monitoring sodium intake, cholesterol and fat intake. patient aware to limit sodium, no added sodium to diet. reviewed foods to avoid, how to order foods when eating out, how to read nutrition labels and measure sodium, cholesterol and fat intake. Pt instructed to follow with diabetic diet- monitoring sugar intake, limiting foods with high sugar content. SN discussed dietary adjustments such as: reduce portion sizes, limit daily carbohydrate intake to not greater than 45-60 grams per meal for a total of <180 grams of carbohydrate daily, avoid concentrated carbohydrates such as soft drinks, sweet tea, and sweet treats and to increase physical activity along with strength training as tolerated to facilitate weight loss and build muscle mass  patient/cg instructed to monitor for edema/increase in edema, to elevate extremity when edema occurs and to notify md if edema exceeds normal limits for patient    Patient level of understanding of education provided: Good, verbalized understanding. Sharps Education Provided: Clinician instructed patient/CG on proper disposal of sharps: Containers should be made of hard plastic, be puncture-resistant and leakproof,   such as a laundry detergent or bleach bottle.  When the container is ¾ full, it should be sealed with tape and labeled   DO NOT RECYCLE prior to discarding in the regular trash.      Patient response to procedure performed:  No procedure performed.     Home exercise program/Homework provided: Take medications as prescribed, safety, fall precautions, monitor blood sugars, keep all MD appointments. Pt/Caregiver instructed on plan of care and are agreeable to plan of care at this time. Physician Rafael Gonzalez MD notified of patient admission to home health and plan of care including anticipated frequency of 1W1, 2PRN skilled nursing evaluation-patient/caregiver knowledgeable of chronic disease processes/medications-PT/OT evaluation. Next MD appointment 6/7/22 with Rafael Gonzalez MD.  Patient/caregiver encouraged/instructed to keep appointment as lack of follow through with physician appointment could result in discontinuation of home care services for non-compliance.

## 2022-06-07 ENCOUNTER — HOME CARE VISIT (OUTPATIENT)
Dept: HOME HEALTH SERVICES | Facility: HOME HEALTH | Age: 82
End: 2022-06-07
Payer: MEDICARE

## 2022-06-07 ENCOUNTER — TELEPHONE (OUTPATIENT)
Dept: ORTHOPEDIC SURGERY | Age: 82
End: 2022-06-07

## 2022-06-07 ENCOUNTER — OFFICE VISIT (OUTPATIENT)
Dept: ORTHOPEDIC SURGERY | Age: 82
End: 2022-06-07
Payer: MEDICARE

## 2022-06-07 VITALS — TEMPERATURE: 97.1 F | WEIGHT: 223 LBS | BODY MASS INDEX: 31.22 KG/M2 | HEIGHT: 71 IN

## 2022-06-07 VITALS
HEART RATE: 78 BPM | RESPIRATION RATE: 20 BRPM | TEMPERATURE: 98.1 F | SYSTOLIC BLOOD PRESSURE: 130 MMHG | DIASTOLIC BLOOD PRESSURE: 82 MMHG | OXYGEN SATURATION: 98 %

## 2022-06-07 DIAGNOSIS — M17.11 PRIMARY OSTEOARTHRITIS OF RIGHT KNEE: Primary | ICD-10-CM

## 2022-06-07 DIAGNOSIS — M17.12 PRIMARY OSTEOARTHRITIS OF LEFT KNEE: Primary | ICD-10-CM

## 2022-06-07 PROCEDURE — 3331090002 HH PPS REVENUE DEBIT

## 2022-06-07 PROCEDURE — 20611 DRAIN/INJ JOINT/BURSA W/US: CPT | Performed by: ORTHOPAEDIC SURGERY

## 2022-06-07 PROCEDURE — 3331090001 HH PPS REVENUE CREDIT

## 2022-06-07 NOTE — CASE COMMUNICATION
Hi Dr. Ailyn Martinez,      I wanted to make you aware that Mr. Juanita Valenzuela has been admitted to home health services at this time, but does not require skilled nursing services at this time. If the need should change, we will add skilled nursing visits. PT/OT to evaluate and treat, and HHA to assist with ADL's, bathing. Thank you for allowing [facility] to care for your patient.     Thank you,  Filippo Rocha, BSN, RN

## 2022-06-07 NOTE — TELEPHONE ENCOUNTER
Patient would like to get Orthovisc Series for his Right knee now. He just completed the series for his Left knee.

## 2022-06-07 NOTE — PROGRESS NOTES
Patient: Red Martin                MRN: [id number]       SSN: [id number]  YOB: 1940        AGE: 79 y.o. SEX: male  Body mass index is 31.1 kg/m². PCP: Bonnie Locke MD  06/07/22    Chief Complaint   Patient presents with    Injection     LT knee       HISTORY:  Red Martin is a 79 y.o. male who is seen for reevaluation of Left knee and here for 4th and final injection of Orthovisc. PROCEDURE:  Under ultrasound guidance, patient's Left knee, after timeout under sterile conditions, was injected with 2 cc of Orthovisc. Intra-articular. Ultrasound images captured using [employer]1 RedHelper Loop Ultrasound machine using a frequency of 10 MHz with a linear transducer and scanned into patient's chart. [facility]  OFFICE PROCEDURE PROGRESS NOTE        Chart reviewed for the following:   Aleida Vasquez MD, have reviewed the History, Physical and updated the Allergic reactions for [address] performed immediately prior to start of procedure:   Aleida Vasquez MD, have performed the following reviews on Red Martin prior to the start of the procedure:            * Patient was identified by name and date of birth   * Agreement on procedure being performed was verified  * Risks and Benefits explained to the patient  * Procedure site verified and marked as necessary  * Patient was positioned for comfort  * Needle placement confirmed by ultrasound  * Consent was signed and verified     Time: 10:07 AM       Date of procedure: 6/7/2022    Procedure performed by:  Carolina Dobbs MD    Provider assisted by: None     How tolerated by patient: tolerated the procedure well with no complications    Comments: none    IMPRESSION:     ICD-10-CM ICD-9-CM    1. Primary osteoarthritis of left knee  M17.12 715.16         PLAN: Mr. Susna Angel has completed his Orthovisc injection series.  he will return as needed. Scribed by Luana Viera [address]) as dictated by Harmeet Up MD    I, Dr. Harmeet Up, confirm that all documentation is accurate.     Harmeet Up M.D.   [facility]

## 2022-06-08 PROCEDURE — 3331090002 HH PPS REVENUE DEBIT

## 2022-06-08 PROCEDURE — 3331090001 HH PPS REVENUE CREDIT

## 2022-06-09 ENCOUNTER — HOME CARE VISIT (OUTPATIENT)
Dept: SCHEDULING | Facility: HOME HEALTH | Age: 82
End: 2022-06-09
Payer: MEDICARE

## 2022-06-09 VITALS
TEMPERATURE: 97.2 F | SYSTOLIC BLOOD PRESSURE: 118 MMHG | DIASTOLIC BLOOD PRESSURE: 70 MMHG | HEART RATE: 60 BPM | OXYGEN SATURATION: 99 %

## 2022-06-09 PROCEDURE — 3331090002 HH PPS REVENUE DEBIT

## 2022-06-09 PROCEDURE — 3331090001 HH PPS REVENUE CREDIT

## 2022-06-09 PROCEDURE — G0152 HHCP-SERV OF OT,EA 15 MIN: HCPCS

## 2022-06-09 NOTE — Clinical Note
Mr. Trisha Best presents with good rehab potential to progress his independence with ADLS, IADLs and functional mobility in his home. Pt currently not using his tub shower unit and is sponge bathing. Pt also reporting increased pain in B knees causing him to ask his grandson and wife for assistance with socks and shoes as needed. Pt declining practicing adaptive equipment that could increase his functional independence/pain levels. Pt expressing no need/desire for home health occupational therapy at this time.     PLAN: D/C 1w1 with plans to discharge with home health SN

## 2022-06-09 NOTE — HOME HEALTH
Skilled Reason for admission/summary of clinical condition: 79-year old male who was recently seen by MD regarding osteoarthritis of left knee-states he has been getting the cortisone/gel injections and having another one at upcoming appointment. Clinical Condition per EPIC: HISTORY:  Murphy Lou is a 79 y.o. male who is seen for reevaluation of Left knee and here for 3rd injection of Orthovisc. PROCEDURE:  Under ultrasound guidance, patient's Left knee, after timeout under sterile conditions, was injected with 2 cc of Orthovisc. Intra-articular. Ultrasound images captured using [employer] Q[employer] Ultrasound machine using a frequency of 10 MHz with a linear transducer and scanned into patient's chart.     List of Comorbidities: Primary osteoarthritis of left knee    Caregiver: Pt spopuse assists with IADL's, medications, meal preparation, and transportation to MD appointments. PLOF: He lives in a 1 story house with 3 steps to enter with his wife. Pt was previously independent with ADLs and IADLs with him still driving. Pt was previously using cane for ambulation intermitantly when out of the house. Medications: Pt reports no changes to medications since last reviewed and educated to continue as directed per MD.    SUBJECTIVE: Pt reports feeling B leg fatigue more than pain. Pt with frustrations with arthritis. Pt expressing awaiting response from [facility]" who had hime complete a test but was unable to recall what type of test.   DME ORDERED/RECOMMENEDED: N/A    OBJECTIVE:  BATHING: Pt currently sponge bathing and no using his tub shower unit. Pt set up for sponge bathing. Pt educated on tub transfer bench option however declinded with preference for sponge bathing at this time. TOILETING: Pt indepedent for toileting using regular toilet  UB DRESSING: Pt independent for upper body dressing  LB DRESSING: Pt able to dorothea/doff brief/pants without assistance.  Pt with intermitant assistance for lower body dressing of socks/shoes when knees weak/in pain but able to complete with supervision with increased time if needed. Pt educated on sock and and long shoe horn option however declinded expressing \" I can do it but if I have someone to help then they can help. \"  GROOMING: Pt independent for grooming including oral care, hair care and washing hands/face  FEEDING: Pt completed self feeding independently    Modified Eugenio RPE 2/10 after performing ambulation, transfers, and I/ADL assessment     OT instructed/demonstrated pt the following with good understanding:     IADL: Pt with assists with all IADLs as needed including transportation to MD appointments  AMBULATION: Pt supervision for ambualting short house hold distances using no assistive device. Pt expressed diffiuclty with increased distances such as whne he is walking outside the house. EOB/BED TRANSFER: Pt independent for bed mobility  COUCH: Pt modified independent for sit to stand transfers using no assistive device  TOILET: Pt supervision for toilet transfers using no assistive device  TUB SHOWER: Pt has tub shower unit but is sponge bathing. Pt educated on tub transfer bench option but expressed preference for songe bathing. PATIENT RESPONSE TO TREATMENT:  Pt responded well to home health occupational therapy assessment with no complaints throughout. PATIENT EDUCATION PROVIDED THIS VISIT: OT role, adaptive euipment/ DME, fall prevention/safety training ADL/IADL tasks, continue diet and medications as instructed per MD, consult MD or urgent care for medical assistance as opposed to ER unless situation emergent. PATIENT LEVEL OF UNDERSTANDING OF EDUCATION PROVIDED:  Pt able to teach back role of OT with good understanding. Pt able to teach back adaptive equipment options such as sock aid, long shoe horn and tub transfer bench to increase his saftey and independence with ADLs however still declinded.      REHAB POTENTIAL: Mr. Yusra Solano presents with good rehab potential to progress his independence with ADLS, IADLs and functional mobility in his home. Pt currently not using his tub shower unit and is sponge bathing. Pt also reporting increased pain in B knees causing him to ask his grandson and wife for assistance with socks and shoes as needed. Pt declining practicing adaptive equipment that could increase his functional independnce/pain levels. Pt expressing no need/desire for home health occupational therapy at this time. HOME EXERCISE PROGRAM: N/A    ASSESSMENT: Pt presents with B knee pain/fatigue. Pt presenting with fair+ standing balance during house hold mobility using no assistive device. Pt does have small based quad cane if needed for mobility with increased pain for longer distances. Pt has tub shower unit with no adaptive equipment and would benifit from a tub transfer bench to increase his ability to access his shower for bathing. Pt however declining and expressing current preference for sponge bathing seated on toilet. Pt to complete lower body dressing with supervision however has been more recently requesting assistance when available for donning his socks and shoes. Pt would benifit from sock aid/long shoe horn use to increase his independence but declinded with ability to complete without equipment as desired. Pt presents with good BUE strength grossly of 4+/5 which assist with his ability to complete functional mobility including toilet transfers. Pt ultimatley [facility] with expressed confidence with his ability to complete ADLs. Skilled Care Provided: Pt completed full occupational therapy assessment to include balance, coordination, strengthening, ADL education/training, functional mobility and home safety.     PLAN: D/C 1w1 with plans to discharge with home health SN

## 2022-06-09 NOTE — Clinical Note
Therapy Functional Score Assessment  Question                                            Score   Grooming                            0       Upper Dressing   0      Lower Dressing   1      Bathing                 4      Toilet Transfer                   1    Transfer                0            Ambulation                         3   Dyspnea                     2       Pain Interfering with activity        4  Est number therapy visits      1    Mr. Evelyn Chatman presents with good rehab potential to progress his independence with ADLS, IADLs and functional mobility in his home. Pt currently not using his tub shower unit and is sponge bathing. Pt also reporting increased pain in B knees causing him to ask his grandson and wife for assistance with socks and shoes as needed. Pt declining practicing adaptive equipment that could increase his functional independence/pain levels. Pt expressing no need/desire for home health occupational therapy at this time.     PLAN: D/C 1w1 with plans to discharge with home health SN

## 2022-06-10 PROCEDURE — 3331090002 HH PPS REVENUE DEBIT

## 2022-06-10 PROCEDURE — 3331090001 HH PPS REVENUE CREDIT

## 2022-06-11 PROCEDURE — 3331090001 HH PPS REVENUE CREDIT

## 2022-06-11 PROCEDURE — 3331090002 HH PPS REVENUE DEBIT

## 2022-06-12 PROCEDURE — 3331090002 HH PPS REVENUE DEBIT

## 2022-06-12 PROCEDURE — 3331090001 HH PPS REVENUE CREDIT

## 2022-06-13 ENCOUNTER — HOME CARE VISIT (OUTPATIENT)
Dept: HOME HEALTH SERVICES | Facility: HOME HEALTH | Age: 82
End: 2022-06-13
Payer: MEDICARE

## 2022-06-13 PROCEDURE — 3331090002 HH PPS REVENUE DEBIT

## 2022-06-13 PROCEDURE — 3331090001 HH PPS REVENUE CREDIT

## 2022-06-14 PROCEDURE — 3331090002 HH PPS REVENUE DEBIT

## 2022-06-14 PROCEDURE — 3331090001 HH PPS REVENUE CREDIT

## 2022-06-15 ENCOUNTER — HOME CARE VISIT (OUTPATIENT)
Dept: SCHEDULING | Facility: HOME HEALTH | Age: 82
End: 2022-06-15
Payer: MEDICARE

## 2022-06-15 PROCEDURE — 3331090002 HH PPS REVENUE DEBIT

## 2022-06-15 PROCEDURE — 3331090001 HH PPS REVENUE CREDIT

## 2022-06-16 PROCEDURE — 3331090002 HH PPS REVENUE DEBIT

## 2022-06-16 PROCEDURE — 3331090001 HH PPS REVENUE CREDIT

## 2022-06-17 PROCEDURE — 3331090001 HH PPS REVENUE CREDIT

## 2022-06-17 PROCEDURE — 3331090002 HH PPS REVENUE DEBIT

## 2022-06-18 PROCEDURE — 3331090001 HH PPS REVENUE CREDIT

## 2022-06-18 PROCEDURE — 3331090002 HH PPS REVENUE DEBIT

## 2022-06-19 PROCEDURE — 3331090002 HH PPS REVENUE DEBIT

## 2022-06-19 PROCEDURE — 3331090001 HH PPS REVENUE CREDIT

## 2022-06-20 PROCEDURE — 3331090001 HH PPS REVENUE CREDIT

## 2022-06-20 PROCEDURE — 3331090002 HH PPS REVENUE DEBIT

## 2022-06-21 PROCEDURE — 3331090002 HH PPS REVENUE DEBIT

## 2022-06-21 PROCEDURE — 3331090001 HH PPS REVENUE CREDIT

## 2022-06-22 PROCEDURE — 3331090002 HH PPS REVENUE DEBIT

## 2022-06-22 PROCEDURE — 3331090001 HH PPS REVENUE CREDIT

## 2022-06-23 PROCEDURE — 3331090002 HH PPS REVENUE DEBIT

## 2022-06-23 PROCEDURE — 3331090001 HH PPS REVENUE CREDIT

## 2022-06-24 PROCEDURE — 3331090002 HH PPS REVENUE DEBIT

## 2022-06-24 PROCEDURE — 3331090001 HH PPS REVENUE CREDIT

## 2022-06-25 PROCEDURE — 3331090002 HH PPS REVENUE DEBIT

## 2022-06-25 PROCEDURE — 3331090001 HH PPS REVENUE CREDIT

## 2022-06-26 PROCEDURE — 3331090001 HH PPS REVENUE CREDIT

## 2022-06-26 PROCEDURE — 3331090002 HH PPS REVENUE DEBIT

## 2022-06-27 PROCEDURE — 3331090002 HH PPS REVENUE DEBIT

## 2022-06-27 PROCEDURE — 3331090001 HH PPS REVENUE CREDIT

## 2022-06-28 ENCOUNTER — HOME CARE VISIT (OUTPATIENT)
Dept: HOME HEALTH SERVICES | Facility: HOME HEALTH | Age: 82
End: 2022-06-28
Payer: MEDICARE

## 2022-06-28 PROCEDURE — 3331090003 HH PPS REVENUE ADJ

## 2022-06-28 PROCEDURE — 3331090002 HH PPS REVENUE DEBIT

## 2022-06-28 PROCEDURE — 3331090001 HH PPS REVENUE CREDIT

## 2022-06-29 ENCOUNTER — TELEPHONE (OUTPATIENT)
Dept: ORTHOPEDIC SURGERY | Age: 82
End: 2022-06-29

## 2022-07-21 ENCOUNTER — OFFICE VISIT (OUTPATIENT)
Dept: ORTHOPEDIC SURGERY | Age: 82
End: 2022-07-21
Payer: MEDICARE

## 2022-07-21 VITALS
WEIGHT: 218 LBS | BODY MASS INDEX: 30.4 KG/M2 | TEMPERATURE: 98.2 F | HEART RATE: 64 BPM | RESPIRATION RATE: 19 BRPM | OXYGEN SATURATION: 97 %

## 2022-07-21 DIAGNOSIS — M47.816 LUMBAR SPONDYLOSIS: ICD-10-CM

## 2022-07-21 DIAGNOSIS — M54.50 LOW BACK PAIN, UNSPECIFIED BACK PAIN LATERALITY, UNSPECIFIED CHRONICITY, UNSPECIFIED WHETHER SCIATICA PRESENT: Primary | ICD-10-CM

## 2022-07-21 PROCEDURE — 96372 THER/PROPH/DIAG INJ SC/IM: CPT | Performed by: NURSE PRACTITIONER

## 2022-07-21 PROCEDURE — 99024 POSTOP FOLLOW-UP VISIT: CPT | Performed by: NURSE PRACTITIONER

## 2022-07-21 PROCEDURE — 72100 X-RAY EXAM L-S SPINE 2/3 VWS: CPT | Performed by: NURSE PRACTITIONER

## 2022-07-21 RX ORDER — SPIRONOLACTONE 50 MG/1
50 TABLET, FILM COATED ORAL DAILY
COMMUNITY
Start: 2022-07-13

## 2022-07-21 RX ORDER — KETOROLAC TROMETHAMINE 30 MG/ML
30 INJECTION, SOLUTION INTRAMUSCULAR; INTRAVENOUS
Status: COMPLETED | OUTPATIENT
Start: 2022-07-21 | End: 2022-07-21

## 2022-07-21 RX ADMIN — KETOROLAC TROMETHAMINE 30 MG: 30 INJECTION, SOLUTION INTRAMUSCULAR; INTRAVENOUS at 13:57

## 2022-07-21 NOTE — PROGRESS NOTES
Chief complaint   Chief Complaint   Patient presents with    Back Pain       History of Present Illness:  Anu Andrade is a  79 y.o.  male who comes in today as a new patient. He states he was originally sent to another practice by his PCP but they did not take his insurance. He states he has had back pain for about 40 years. He had a flare of pain last week and it has calmed down a little bit. He is hoping to get some kind of injection to help settle it down more. He states it is only in his low back and does not radiate into his buttock or legs. He denies fever bowel bladder dysfunction. He is diabetic and states his blood sugar is controlled. He states his kidney function is normal.  Review of labs in May does show a normal BUN and GFR and creatinine. Past Medical History:    Hypertension, BPH, hyperlipidemia, type 2 diabetes, seasonal allergies, bilateral knee pain    Past Surgical History:    Bilateral inguinal hernia repair x2. Right shoulder lipoma resection. Left elbow surgery. Physical Exam: Patient is 44-year-old male well-developed well-nourished who is alert and oriented with a normal mood and affect. He has a full weightbearing nonantalgic gait. He did not use any assist device. He has 4 and 5 strength bilateral lower extremities. Negative straight leg raise. He has pain with hyperextension lumbar spine. Assessment and Plan: This is a patient who has lumbar spondylosis. We did do an x-ray that shows this. I will give him Toradol 30 mg IM today. He is not really candidate for oral NSAIDs. We will see him back as needed.     XRAY: 07/21/22   body part: Lumbar  side (rt/lt): bilateral  number of views taken:2  Findings: spondylosis      Administrations This Visit       ketorolac (TORADOL) injection 30 mg       Admin Date  07/21/2022  13:57 Action  Given Dose  30 mg Route  IntraMUSCular Site  Right Gluteus Edil Administered By  RT Julian    NDC: 52269-348-60 Patient Supplied?: No                          The x-ray will be officially read by Dr. Elizabeth Washington and scanned into the chart. Medications:  Current Outpatient Medications   Medication Sig Dispense Refill    spironolactone (ALDACTONE) 50 mg tablet Take 50 mg by mouth in the morning. furosemide (LASIX) 40 mg tablet Take 1 Tablet by mouth daily. 90 Tablet 1    pravastatin (PRAVACHOL) 20 mg tablet take 1 tablet by mouth nightly 90 Tablet 2    finasteride (PROSCAR) 5 mg tablet take 1 tablet by mouth once daily (Patient taking differently: Take 5 mg by mouth in the morning. take 1 tablet by mouth once daily) 90 Tablet 4    silodosin (Rapaflo) 8 mg capsule take 1 capsule by mouth once daily with BREAKFAST (Patient taking differently: Take 8 mg by mouth daily (with breakfast). take 1 capsule by mouth once daily with BREAKFAST) 90 Capsule 4    aspirin delayed-release 81 mg tablet Take 81 mg by mouth daily. candesartan-hydroCHLOROthiazide (ATACAND HCT) 32-25 mg tab per tablet Take 1 Tab by mouth daily. latanoprost (XALATAN) 0.005 % ophthalmic solution Administer 1 Drop to both eyes nightly. 0    metoprolol tartrate (LOPRESSOR) 50 mg tablet take 1 tablet by mouth twice a day 60 Tab 6    metFORMIN (GLUCOPHAGE) 500 mg tablet Take 500 mg by mouth daily (with breakfast). montelukast (SINGULAIR) 10 mg tablet Take 10 mg by mouth daily.        Current Facility-Administered Medications   Medication Dose Route Frequency Provider Last Rate Last Admin    ketorolac (TORADOL) injection 30 mg  30 mg IntraMUSCular NOW Madonna Finnegan, NP             Review of systems:    Past Medical History:   Diagnosis Date    Allergic sinusitis     BPH (benign prostatic hyperplasia)     Chronic diastolic heart failure (HCC) 2/11/2014    Congestive heart failure, unspecified     DJD (degenerative joint disease)     Erectile dysfunction     Essential hypertension     Hydrocele     Hyperlipidemia 5/2/2016    Mitral valve disorders(424.0) 2014 mild MR     Obesity, unspecified 11/10/2014    Syncope and collapse 2014 likely vagal, post 1st dose hytrin 5/15 no recurrence      Past Surgical History:   Procedure Laterality Date    HX CIRCUMCISION      HX HERNIA REPAIR Bilateral      Social History     Socioeconomic History    Marital status:      Spouse name: Not on file    Number of children: Not on file    Years of education: Not on file    Highest education level: Not on file   Occupational History    Not on file   Tobacco Use    Smoking status: Former     Types: Cigarettes     Quit date: 1974     Years since quittin.5    Smokeless tobacco: Never   Substance and Sexual Activity    Alcohol use: No    Drug use: No    Sexual activity: Not on file   Other Topics Concern    Not on file   Social History Narrative    Not on file     Social Determinants of Health     Financial Resource Strain: Not on file   Food Insecurity: Not on file   Transportation Needs: Not on file   Physical Activity: Not on file   Stress: Not on file   Social Connections: Not on file   Intimate Partner Violence: Not on file   Housing Stability: Not on file     Family History   Problem Relation Age of Onset    Heart Attack Father 61    Heart Attack Brother 58    Prostate Cancer Other     Stroke Other     Hypertension Other        Physical Exam:  Visit Vitals  Pulse 64   Temp 98.2 °F (36.8 °C)   Resp 19   Wt 218 lb (98.9 kg)   SpO2 97%   BMI 30.40 kg/m²     Pain Scale: 4/10    LPMP has been . reviewed and is appropriate        Diagnoses and all orders for this visit:    1. Low back pain, unspecified back pain laterality, unspecified chronicity, unspecified whether sciatica present  -     AMB POC XRAY, SPINE, LUMBOSACRAL; 2 O  -     ketorolac (TORADOL) injection 30 mg    2. Lumbar spondylosis  -     ketorolac (TORADOL) injection 30 mg          Follow-up and Dispositions    Return if symptoms worsen or fail to improve. We have informed Royce Case to notify us for immediate appointment if he has any worsening neurogical symptoms or if an emergency situation presents, then call 911      Please note that this dictation was completed with Int[employer]ua, the computer voice recognition software. Quite often unanticipated grammatical, syntax, homophones, and other interpretive errors are inadvertently transcribed by the computer software. Please disregard these errors. Please excuse any errors that have escaped final proofreading.

## 2022-11-27 DIAGNOSIS — I50.32 CHRONIC DIASTOLIC HEART FAILURE (HCC): ICD-10-CM

## 2022-11-27 RX ORDER — FUROSEMIDE 40 MG/1
TABLET ORAL
Qty: 90 TABLET | Refills: 1 | Status: SHIPPED | OUTPATIENT
Start: 2022-11-27

## 2022-12-06 ENCOUNTER — OFFICE VISIT (OUTPATIENT)
Dept: ORTHOPEDIC SURGERY | Age: 82
End: 2022-12-06

## 2022-12-06 ENCOUNTER — OFFICE VISIT (OUTPATIENT)
Dept: ORTHOPEDIC SURGERY | Age: 82
End: 2022-12-06
Payer: MEDICARE

## 2022-12-06 VITALS
TEMPERATURE: 97.3 F | BODY MASS INDEX: 32.43 KG/M2 | WEIGHT: 214 LBS | HEIGHT: 68 IN | HEART RATE: 63 BPM | OXYGEN SATURATION: 97 % | RESPIRATION RATE: 18 BRPM

## 2022-12-06 DIAGNOSIS — M54.50 LOW BACK PAIN, UNSPECIFIED BACK PAIN LATERALITY, UNSPECIFIED CHRONICITY, UNSPECIFIED WHETHER SCIATICA PRESENT: ICD-10-CM

## 2022-12-06 DIAGNOSIS — M51.36 DDD (DEGENERATIVE DISC DISEASE), LUMBAR: ICD-10-CM

## 2022-12-06 DIAGNOSIS — M47.816 LUMBAR SPONDYLOSIS: ICD-10-CM

## 2022-12-06 DIAGNOSIS — M47.816 LUMBAR SPONDYLOSIS: Primary | ICD-10-CM

## 2022-12-06 PROBLEM — R29.898 WEAKNESS OF BOTH LEGS: Status: ACTIVE | Noted: 2022-12-06

## 2022-12-06 PROCEDURE — 99214 OFFICE O/P EST MOD 30 MIN: CPT | Performed by: PHYSICAL MEDICINE & REHABILITATION

## 2022-12-06 PROCEDURE — 1123F ACP DISCUSS/DSCN MKR DOCD: CPT | Performed by: PHYSICAL MEDICINE & REHABILITATION

## 2022-12-06 PROCEDURE — G8432 DEP SCR NOT DOC, RNG: HCPCS | Performed by: PHYSICAL MEDICINE & REHABILITATION

## 2022-12-06 PROCEDURE — G8756 NO BP MEASURE DOC: HCPCS | Performed by: PHYSICAL MEDICINE & REHABILITATION

## 2022-12-06 PROCEDURE — G8536 NO DOC ELDER MAL SCRN: HCPCS | Performed by: PHYSICAL MEDICINE & REHABILITATION

## 2022-12-06 PROCEDURE — G8427 DOCREV CUR MEDS BY ELIG CLIN: HCPCS | Performed by: PHYSICAL MEDICINE & REHABILITATION

## 2022-12-06 PROCEDURE — G8417 CALC BMI ABV UP PARAM F/U: HCPCS | Performed by: PHYSICAL MEDICINE & REHABILITATION

## 2022-12-06 PROCEDURE — 1101F PT FALLS ASSESS-DOCD LE1/YR: CPT | Performed by: PHYSICAL MEDICINE & REHABILITATION

## 2022-12-06 RX ORDER — GABAPENTIN 100 MG/1
100 CAPSULE ORAL 3 TIMES DAILY
Qty: 90 CAPSULE | Refills: 1 | Status: SHIPPED | OUTPATIENT
Start: 2022-12-06

## 2022-12-06 RX ORDER — ASPIRIN 81 MG/1
TABLET ORAL DAILY
COMMUNITY

## 2022-12-06 NOTE — PROGRESS NOTES
[facility]  [address]  [facility], [address]  Phone: [phone]  Fax: [phone]        PROGRESS NOTE      HISTORY OF PRESENT ILLNESS:  The patient is a 79 y.o. male and was previously seen by Roxann Marin NP dated 7/21/2022 for low back pain. Patient reports his pain is a 0/10. Patient reports he has low back pain without radiculopathy. His pain is exacerbated by standing and walking, relieved with sitting. Patient reports Positive shopping cart sign. Patient reports his bilateral leg weakness has gotten worse in the past couple of months. Patient is unsure why the note says he can not take motrin. Patient denies recent fevers, weight loss, rashes or skin sores. No stomach ulcers or bleeding disorders. No history of spinal surgery or injection. Patient denies recent physical therapy or chiropractic care in the past. Pt denies change in bowel or bladder habits. PmHx of [place], HTN, DM. Madonna stanley NP dated 7/21/2022 back pain 40 years. Intermittent. DM. Not candidate for NSAIDs. Patient was given a injection. Note from Jovanny Díaz MD dated 10/24/2022 bilateral leg weakness, HTN, DM. Both of his legs for 7-8 months and only feels it when he is walking or standing, but not when he is sitting.  reviewed. There is no height or weight on file to calculate BMI.     PCP: Vesna Harris MD      Past Medical History:   Diagnosis Date    Allergic sinusitis     BPH (benign prostatic hyperplasia)     Chronic diastolic heart failure ([address] 2/11/2014    Congestive heart failure, unspecified     Diabetes mellitus ([address] 3/29/2013    DJD (degenerative joint disease)     Erectile dysfunction     Essential hypertension     Hydrocele     Hyperlipidemia 5/2/2016    Mitral valve disorders(424.0) 2/11/2014 1/14 mild MR     Obesity, unspecified 11/10/2014    Syncope and collapse 1/14/2014 1/14 likely vagal, post 1st dose hytrin 5/15 no recurrence Social History     Socioeconomic History    Marital status:      Spouse name: Not on file    Number of children: Not on file    Years of education: Not on file    Highest education level: Not on file   Occupational History    Not on file   Tobacco Use    Smoking status: Former     Types: Cigarettes     Quit date: 1974     Years since quittin.9    Smokeless tobacco: Never   Substance and Sexual Activity    Alcohol use: No    Drug use: No    Sexual activity: Not on file   Other Topics Concern    Not on file   Social History Narrative    Not on file     Social Determinants of Health     Financial Resource Strain: Not on file   Food Insecurity: Not on file   Transportation Needs: Not on file   Physical Activity: Not on file   Stress: Not on file   Social Connections: Not on file   Intimate Partner Violence: Not on file   Housing Stability: Not on file       Current Outpatient Medications   Medication Sig Dispense Refill    aspirin delayed-release 81 mg tablet Take  by mouth daily. furosemide (LASIX) 40 mg tablet take 1 tablet by mouth once daily 90 Tablet 1    spironolactone (ALDACTONE) 50 mg tablet Take 50 mg by mouth in the morning. pravastatin (PRAVACHOL) 20 mg tablet take 1 tablet by mouth nightly 90 Tablet 2    finasteride (PROSCAR) 5 mg tablet take 1 tablet by mouth once daily (Patient taking differently: Take 5 mg by mouth daily. take 1 tablet by mouth once daily) 90 Tablet 4    silodosin (Rapaflo) 8 mg capsule take 1 capsule by mouth once daily with BREAKFAST (Patient taking differently: Take 8 mg by mouth daily (with breakfast). take 1 capsule by mouth once daily with BREAKFAST) 90 Capsule 4    candesartan-hydroCHLOROthiazide (ATACAND HCT) 32-25 mg tab per tablet Take 1 Tab by mouth daily. latanoprost (XALATAN) 0.005 % ophthalmic solution Administer 1 Drop to both eyes nightly.   0    metoprolol tartrate (LOPRESSOR) 50 mg tablet take 1 tablet by mouth twice a day 60 Tab 6 metFORMIN (GLUCOPHAGE) 500 mg tablet Take 500 mg by mouth daily (with breakfast). montelukast (SINGULAIR) 10 mg tablet Take 10 mg by mouth daily. Allergies   Allergen Reactions    Amlodipine Besylate Swelling    Atorvastatin Calcium Unknown (comments)    Tramadol Unknown (comments)          PHYSICAL EXAMINATION    There were no vitals taken for this visit. CONSTITUTIONAL: NAD, A&O x 3  SENSATION: Intact to light touch throughout  RANGE OF MOTION: The patient has full passive range of motion in all four extremities. MOTOR:  Straight Leg Raise: Negative, bilateral    Ambulates without assistive device. Hip Flex Knee Ext Knee Flex Ankle DF GTE Ankle PF Tone   Right +4/5 +4/5 +4/5 +4/5 +4/5 +4/5 +4/5   Left +4/5 +4/5 +4/5 +4/5 +4/5 +4/5 +4/5       ASSESSMENT   Diagnoses and all orders for this visit:    1. Lumbar spondylosis    2. Low back pain, unspecified back pain laterality, unspecified chronicity, unspecified whether sciatica present    3. DDD (degenerative disc disease), lumbar        IMPRESSION AND PLAN:  Patient returns to the office today with c/o low back pain without radiculopathy. Multiple treatment options were discussed. Patient's low back pain is consistent with stenosis with bilateral leg weakness with walking and standing. I will order a L spine MRI. I advised patient to bring copies of films to next visit. I will try him on Gabapentin 100 mg TID. The risks, benefits, and potential side effects of this medication were discussed. Patient understands and wishes to proceed. Patient advised to call the office if intolerant to new medication. Patient is neurologically intact. I will see the patient back after MRI or earlier if needed. Written by Colleen Perez, as dictated by Taya Trujillo MD  I examined the patient, reviewed and agree with the note.

## 2022-12-06 NOTE — LETTER
12/6/2022    Patient: Camille Gutierrez   YOB: 1940   Date of Visit: 12/6/2022     Yong Epstein MD  [address] [id number]  Via Fax: [phone]    Dear Yong Epstein MD,      Thank you for referring Mr. Richie Francis to [address] for evaluation. My notes for this consultation are attached. If you have questions, please do not hesitate to call me. I look forward to following your patient along with you.       Sincerely,    Susan Ramos MD

## 2022-12-06 NOTE — PROGRESS NOTES
[facility]  [address]  [facility], [facility] [address]   Phone: [phone]  Fax: [phone]        INITIAL CONSULTATION      HISTORY OF PRESENT ILLNESS:  Marcy Mclean is a 79 y.o. male whom is referred from *** secondary to ***. He rates his pain ***/10. ***    PmHx of ***    Note from *** dated *** indicating patient was seen with c/o ***         The patient is ***HD.  reviewed. There is no height or weight on file to calculate BMI. PCP: Jamie Rock MD    Past Medical History:   Diagnosis Date    Allergic sinusitis     BPH (benign prostatic hyperplasia)     Chronic diastolic heart failure ([address] 2014    Congestive heart failure, unspecified     DJD (degenerative joint disease)     Erectile dysfunction     Essential hypertension     Hydrocele     Hyperlipidemia 2016    Mitral valve disorders(424.0) 2014 mild MR     Obesity, unspecified 11/10/2014    Syncope and collapse 2014 likely vagal, post 1st dose hytrin 5/15 no recurrence           Past Surgical History:   Procedure Laterality Date    HX CIRCUMCISION      HX HERNIA REPAIR Bilateral          Social History     Tobacco Use    Smoking status: Former     Types: Cigarettes     Quit date: 1974     Years since quittin.9    Smokeless tobacco: Never   Substance Use Topics    Alcohol use: No       Work status: The patient is ***. Marital status: N/A ***. Current Outpatient Medications   Medication Sig Dispense Refill    furosemide (LASIX) 40 mg tablet take 1 tablet by mouth once daily 90 Tablet 1    spironolactone (ALDACTONE) 50 mg tablet Take 50 mg by mouth in the morning. pravastatin (PRAVACHOL) 20 mg tablet take 1 tablet by mouth nightly 90 Tablet 2    finasteride (PROSCAR) 5 mg tablet take 1 tablet by mouth once daily (Patient taking differently: Take 5 mg by mouth in the morning.  take 1 tablet by mouth once daily) 90 Tablet 4    silodosin (Rapaflo) 8 mg capsule take 1 capsule by mouth once daily with BREAKFAST (Patient taking differently: Take 8 mg by mouth daily (with breakfast). take 1 capsule by mouth once daily with BREAKFAST) 90 Capsule 4    candesartan-hydroCHLOROthiazide (ATACAND HCT) 32-25 mg tab per tablet Take 1 Tab by mouth daily. latanoprost (XALATAN) 0.005 % ophthalmic solution Administer 1 Drop to both eyes nightly. 0    metoprolol tartrate (LOPRESSOR) 50 mg tablet take 1 tablet by mouth twice a day 60 Tab 6    metFORMIN (GLUCOPHAGE) 500 mg tablet Take 500 mg by mouth daily (with breakfast). montelukast (SINGULAIR) 10 mg tablet Take 10 mg by mouth daily. Allergies   Allergen Reactions    Amlodipine Besylate Swelling    Atorvastatin Calcium Unknown (comments)    Tramadol Unknown (comments)            Family History   Problem Relation Age of Onset    Heart Attack Father 61    Heart Attack Brother 58    Prostate Cancer Other     Stroke Other     Hypertension Other          REVIEW OF SYSTEMS  Constitutional symptoms: Negative  Eyes: Negative  Ears, Nose, Throat, and Mouth: Negative  Cardiovascular: Negative  Respiratory: Negative  Genitourinary: Negative  Integumentary (Skin and/or breast): Negative  Musculoskeletal: Positive for ***  Extremities: Negative for edema. Endocrine/Rheumatologic: Negative  Hematologic/Lymphatic: Negative  Allergic/Immunologic: Negative  Psychiatric: Negative       PHYSICAL EXAMINATION  There were no vitals taken for this visit. CONSTITUTIONAL: NAD, A&O x 3  HEART: Regular rate and rhythm  GASTROINTESTINAL: Positive bowel sounds, soft, nontender, and nondistended  LUNGS: Clear to auscultation bilaterally. SKIN: Negative for rash. RANGE OF MOTION: The patient has full passive range of motion in all four extremities. SENSATION: Decreased sensation to light touch on the *** in a *** distribution. Otherwise, *** sensation is intact to light touch throughout.   MOTOR:   Straight Leg Raise: {Pos/neg/not test:140017::\"Negative\"}, {right/left:11517}  Arthur: {Pos/neg/not test:530690::\"Negative\"}, {right/left:55097}  Tandem Gait: {Blank Single Select Template:20061::\"Neg. \",\"Mild LOB\",\"Severe LOB\"}   Deep tendon reflexes are 2+*** at the biceps, *** at the brachioradialis and ***at the triceps,  ***bilaterally. Deep tendon reflexes are 2+*** at the knees and *** at the ankles ***bilaterally. Ambulates with a ***.    *** Shoulder AB/Flex Elbow Flex Wrist Ext Elbow Ext Wrist Flex Hand Intrin Tone   Right +4/5 +4/5 +4/5 +4/5 +4/5 +4/5 +4/5   Left +4/5 +4/5 +4/5 +4/5 +4/5 +4/5 +4/5             *** Hip Flex Knee Ext Knee Flex Ankle DF GTE Ankle PF Tone   Right +4/5 +4/5 +4/5 +4/5 +4/5 +4/5 +4/5   Left +4/5 +4/5 +4/5 +4/5 +4/5 +4/5 +4/5     RADIOGRAPHS  *** plain films dated ***. 2 views: AP and lateral***. Revealed:  ***      ASSESSMENT   {There are no diagnoses linked to this encounter. (Refresh or delete this SmartLink)}       IMPRESSIONS/RECOMMENDATIONS:  Patient presents today with c/o *** . Multiple treatment options were discussed. *** Patient is neurologically intact. *** I will see the patient back in *** month's time or earlier if needed. Written by Bella Andrews, as dictated by Scarlett Kulkarni MD.  I, Dr. Scarlett Kulkarni, examined the patient, reviewed and agree with the note.

## 2022-12-09 ENCOUNTER — OFFICE VISIT (OUTPATIENT)
Dept: CARDIOLOGY CLINIC | Age: 82
End: 2022-12-09
Payer: MEDICARE

## 2022-12-09 VITALS
SYSTOLIC BLOOD PRESSURE: 146 MMHG | DIASTOLIC BLOOD PRESSURE: 71 MMHG | HEART RATE: 63 BPM | OXYGEN SATURATION: 97 % | BODY MASS INDEX: 31.98 KG/M2 | WEIGHT: 211 LBS | HEIGHT: 68 IN

## 2022-12-09 DIAGNOSIS — I34.0 NON-RHEUMATIC MITRAL REGURGITATION: ICD-10-CM

## 2022-12-09 DIAGNOSIS — I10 ESSENTIAL HYPERTENSION, BENIGN: ICD-10-CM

## 2022-12-09 DIAGNOSIS — E66.9 OBESITY (BMI 30.0-34.9): ICD-10-CM

## 2022-12-09 DIAGNOSIS — E78.00 PURE HYPERCHOLESTEROLEMIA: ICD-10-CM

## 2022-12-09 DIAGNOSIS — I71.21 ANEURYSM OF ASCENDING AORTA WITHOUT RUPTURE: ICD-10-CM

## 2022-12-09 DIAGNOSIS — I50.32 CHRONIC DIASTOLIC HEART FAILURE (HCC): Primary | ICD-10-CM

## 2022-12-09 NOTE — PROGRESS NOTES
1. Have you been to the ER, urgent care clinic since your last visit? Hospitalized since your last visit? No    2. Have you seen or consulted any other health care providers outside of the [address] [place] [address] since your last visit? Include any pap smears or colon screening. Yes Where: Orthopaedic Leg weakness/ PCP Routine    3. Since your last visit, have you had any of the following symptoms?      swelling in legs/arms. 4.  Have you had any blood work, X-rays or cardiac testing? Yes Where: PCP     Requested: NO     In [facility]: YES    5. Where do you normally have your labs drawn? PCP    6. Do you need any refills today?    No

## 2022-12-09 NOTE — PROGRESS NOTES
HISTORY OF PRESENT ILLNESS  Joan Allison is a 79 y.o. male. Follow-up of CHF, hypertension, hyperlipidemia, MR       5/16 restarted diovan and d/og lisinopril  7/2019 - Stopped taking Atorvastatin due to myalgias  7/21 does not take amlodipine as it makes his head feel funny. Pravastatin causing cramps in the calves. Syncope 3-4 hrs post 1st dose of hytrin in 1/14, gradual fall with mild sweating, urinary incontinence, no vomiting  Similar episode in 2005 approx  No recurrence since not taking hytrin in 1/14 1/22 tolerating amlodipine well now. Says that BP better at home. Follow-up  Associated symptoms include shortness of breath. Pertinent negatives include no chest pain and no headaches. Shortness of Breath  The history is provided by the Patient. This is a new problem. The current episode started more than 1 week ago (few yrs, mainly fatigue). The problem has not changed since onset. Associated symptoms include leg swelling. Pertinent negatives include no fever, no headaches, no cough, no wheezing, no PND, no orthopnea, no chest pain, no vomiting, no rash and no claudication. The problem's precipitants include exercise (weed eating, rushing to walk; 11/15 cutting grass, weed eating; 1/21 not much active at all due to knees, mail box; 6/22 limited due to claudication;). Leg Swelling  The history is provided by the Patient. This is a chronic problem. The current episode started more than 1 week ago. The problem occurs daily. The problem has been rapidly improving. Associated symptoms include shortness of breath. Pertinent negatives include no chest pain and no headaches. The symptoms are aggravated by standing. The symptoms are relieved by sleep. Review of Systems   Constitutional:  Negative for chills, fever, malaise/fatigue and weight loss. HENT:  Negative for nosebleeds. Eyes:  Negative for discharge. Respiratory:  Positive for shortness of breath. Negative for cough and wheezing. Cardiovascular:  Positive for leg swelling. Negative for chest pain, palpitations, orthopnea, claudication and PND. Gastrointestinal:  Negative for diarrhea, nausea and vomiting. Genitourinary:  Negative for dysuria and hematuria. Musculoskeletal:  Negative for joint pain. Skin:  Negative for rash. Neurological:  Negative for dizziness, seizures, loss of consciousness and headaches. Endo/Heme/Allergies:  Negative for polydipsia. Does not bruise/bleed easily. Psychiatric/Behavioral:  Negative for depression and substance abuse. The patient does not have insomnia. Allergies   Allergen Reactions    Amlodipine Besylate Swelling    Atorvastatin Calcium Unknown (comments)    Tramadol Unknown (comments)       Past Medical History:   Diagnosis Date    Allergic sinusitis     BPH (benign prostatic hyperplasia)     Chronic diastolic heart failure (HCC) 2014    Congestive heart failure, unspecified     Diabetes mellitus ([facility] [address] 3/29/2013    DJD (degenerative joint disease)     Erectile dysfunction     Essential hypertension     Hydrocele     Hyperlipidemia 2016    Mitral valve disorders(424.0) 2014 mild MR     Obesity, unspecified 11/10/2014    Syncope and collapse 2014 likely vagal, post 1st dose hytrin 5/15 no recurrence        Family History   Problem Relation Age of Onset    Heart Attack Father 61    Heart Attack Brother 58    Prostate Cancer Other     Stroke Other     Hypertension Other        Social History     Tobacco Use    Smoking status: Former     Types: Cigarettes     Quit date: 1974     Years since quittin.9    Smokeless tobacco: Never   Substance Use Topics    Alcohol use: No    Drug use: No        Current Outpatient Medications   Medication Sig    aspirin delayed-release 81 mg tablet Take  by mouth daily.  gabapentin (NEURONTIN) 100 mg capsule Take 1 Capsule by mouth three (3) times daily.  Max Daily Amount: 300 mg.   Edie Mckeon furosemide (LASIX) 40 mg tablet take 1 tablet by mouth once daily    spironolactone (ALDACTONE) 50 mg tablet Take 50 mg by mouth in the morning.  pravastatin (PRAVACHOL) 20 mg tablet take 1 tablet by mouth nightly    finasteride (PROSCAR) 5 mg tablet take 1 tablet by mouth once daily (Patient taking differently: Take 5 mg by mouth daily. take 1 tablet by mouth once daily)    silodosin (Rapaflo) 8 mg capsule take 1 capsule by mouth once daily with BREAKFAST (Patient taking differently: Take 8 mg by mouth daily (with breakfast). take 1 capsule by mouth once daily with BREAKFAST)    candesartan-hydroCHLOROthiazide (ATACAND HCT) 32-25 mg tab per tablet Take 1 Tab by mouth daily.  latanoprost (XALATAN) 0.005 % ophthalmic solution Administer 1 Drop to both eyes nightly.  metoprolol tartrate (LOPRESSOR) 50 mg tablet take 1 tablet by mouth twice a day    metFORMIN (GLUCOPHAGE) 500 mg tablet Take 500 mg by mouth daily (with breakfast).  montelukast (SINGULAIR) 10 mg tablet Take 10 mg by mouth daily. No current facility-administered medications for this visit. Past Surgical History:   Procedure Laterality Date    HX CIRCUMCISION      HX HERNIA REPAIR Bilateral        Diagnostic Studies:    1/14 echo  SUMMARY:   Procedure information: Image quality was fair. Subcostal views were   suboptimal.     Left ventricle: Systolic function was normal. Ejection fraction was   estimated to be 55 %. There were no regional wall motion abnormalities. Doppler parameters were consistent with abnormal left ventricular   relaxation (grade 1 diastolic dysfunction). Left atrium: The atrium was mildly dilated. LA volume index was 33 ml/m   squared. Mitral valve: There was trivial regurgitation.    Visit Vitals  BP (!) 146/71 (BP 1 Location: Left upper arm, BP Patient Position: Sitting, BP Cuff Size: Adult)   Pulse 63   Ht 5' 8\" (1.727 m)   Wt 95.7 kg (211 lb)   SpO2 97%   BMI 32.08 kg/m²       Mr. Josse Nicholas has a reminder for a \"due or due soon\" health maintenance. I have asked that he contact his primary care provider for follow-up on this health maintenance. Physical Exam  Constitutional:       General: He is not in acute distress. Appearance: He is well-developed. HENT:      Head: Normocephalic and atraumatic. Mouth/Throat:      Dentition: Normal dentition. Eyes:      General: No scleral icterus. Right eye: No discharge. Left eye: No discharge. Neck:      Thyroid: No thyromegaly. Vascular: No carotid bruit or JVD. Cardiovascular:      Rate and Rhythm: Normal rate and regular rhythm. Pulses: Intact distal pulses. Heart sounds: Normal heart sounds, S1 normal and S2 normal. No murmur heard. No friction rub. No gallop. Pulmonary:      Effort: Pulmonary effort is normal.      Breath sounds: No wheezing or rales. Abdominal:      Palpations: Abdomen is soft. There is no mass. Tenderness: There is no abdominal tenderness. Musculoskeletal:      Cervical back: Neck supple. Right lower leg: No edema. Left lower leg: Edema (trace to 1) present. Lymphadenopathy:      Cervical:      Right cervical: No superficial cervical adenopathy. Left cervical: No superficial cervical adenopathy. Skin:     General: Skin is warm and dry. Findings: No rash. Neurological:      Mental Status: He is alert and oriented to person, place, and time. Psychiatric:         Behavior: Behavior normal.   06/06/22    ECHO ADULT COMPLETE 06/06/2022 6/6/2022    Interpretation Summary    Left Ventricle: Normal left ventricular systolic function with a visually estimated EF of 55 - 60%. Left ventricle size is normal. Moderately increased wall thickness. Findings consistent with moderate concentric hypertrophy. Normal wall motion. Diastolic dysfunction present with normal LV EF.    Pulmonic Valve: Mild regurgitation.   Aorta: Normal sized aortic root.  Mildly dilated ascending aorta. Ao Ascending diameter is 4.2 cm. Signed by: Zhou Benjamin MD on 6/6/2022 12:13 PM        ASSESSMENT and PLAN        Thoracic aortic aneurysm: 6/22 ASC ao 4.2 cm;  Mitral regurgitation: Trace      1/21 shortness of breath is persisting but today has more edema and a few basal right lung rales. Add Lasix 3 times a week. Labs as ordered. Diet and exercise discussed. Recommended to reduce salty foods. He does not walk much due to the knee issues but I encouraged him to stay active as much as possible. 7/9/2021 CHF is compensated with mild edema. NYHA class II-III  Blood pressure is mildly elevated but patient not taking amlodipine as it makes his head feel funny. Reduce the dose to 2.5 mg a day. Requested that he check his blood pressure if he still feels funny in the head and call us. MR stable. Follow clinically  Labs done at PCP. We will try to get those from them. Diet weight and exercise discussed. 1/20/2022 CHF is persisting NYHA class II-III. Blood pressure is elevated. Increase diuresis and follow-up electrolytes. MR stable. Follow clinically. Check lipids and continue statins. Patient not fully compliant with statins. Diet and exercise discussed. Mediterranean diet guidelines given. 6/1/2022 CHF is persisting with significant edema in the lower legs. Blood pressure is controlled. MR is minimal but will follow-up in echo. Lipids need to be followed up. Follow-up echo to check the LV function and MR. Diet and weight discussed. Mediterranean diet guidelines given. Diagnoses and all orders for this visit:    1. Chronic diastolic heart failure ([address]    2. Non-rheumatic mitral regurgitation    3. Essential hypertension, benign  -     METABOLIC PANEL, BASIC; Future  -     LIPID PANEL; Future  -     HEPATIC FUNCTION PANEL; Future    4. Pure hypercholesterolemia    5. Obesity (BMI 30.0-34.9)    6.  Aneurysm of ascending aorta without rupture        Pertinent laboratory and test data reviewed and discussed with patient. See patient instructions also for other medical advice given    There are no discontinued medications. Follow-up and Dispositions    Return in about 6 months (around 6/9/2023), or if symptoms worsen or fail to improve, for post test.       12/9/2022 CHF is improved. He has lost about 10 pounds and edema is much better. Leg weakness and pain persists consistent with: Claudication. He is seeing a physician but could not tell me the details. Blood pressure is mildly elevated in the office but well controlled at home per patient. Get and follow the home chart before we adjust more medications. Lipids are controlled well. Ascending aorta is mildly aneurysmal and will be followed by serial echocardiograms.

## 2022-12-09 NOTE — PATIENT INSTRUCTIONS
There are no discontinued medications. After the recommended changes have been made in blood pressure medicines, patient advised to keep BP/HR(pulse rate) chart twice daily and bring us results in next 4 to 5 days. Patient may send the results via \"My Chart\" if desired. Please rest for 5-10 minutes before checking blood pressure. Sit on a comfortable chair without crossing the legs and put your arm on a table. We recommend that you use an upper arm cuff. Check the blood pressure 3 times each time you check the blood pressure and record the lowest reading. If you check the blood pressure in both arms, use the higher reading. A Healthy Heart: Care Instructions  Your Care Instructions     Coronary artery disease, also called heart disease, occurs when a substance called plaque builds up in the vessels that supply oxygen-rich blood to your heart muscle. This can narrow the blood vessels and reduce blood flow. A heart attack happens when blood flow is completely blocked. A high-fat diet, smoking, and other factors increase the risk of heart disease. Your doctor has found that you have a chance of having heart disease. You can do lots of things to keep your heart healthy. It may not be easy, but you can change your diet, exercise more, and quit smoking. These steps really work to lower your chance of heart disease. Follow-up care is a key part of your treatment and safety. Be sure to make and go to all appointments, and call your doctor if you are having problems. It's also a good idea to know your test results and keep a list of the medicines you take. How can you care for yourself at home? Diet    Use less salt when you cook and eat. This helps lower your blood pressure. Taste food before salting. Add only a little salt when you think you need it. With time, your taste buds will adjust to less salt. Eat fewer snack items, fast foods, canned soups, and other high-salt, high-fat, processed foods. Read food labels and try to avoid saturated and trans fats. They increase your risk of heart disease by raising cholesterol levels. Limit the amount of solid fat-butter, margarine, and shortening-you eat. Use olive, peanut, or canola oil when you cook. Bake, broil, and steam foods instead of frying them. Eat a variety of fruit and vegetables every day. Dark green, deep orange, red, or yellow fruits and vegetables are especially good for you. Examples include spinach, carrots, peaches, and berries. Foods high in fiber can reduce your cholesterol and provide important vitamins and minerals. High-fiber foods include whole-grain cereals and breads, oatmeal, beans, brown rice, citrus fruits, and apples. Eat lean proteins. Heart-healthy proteins include seafood, lean meats and poultry, eggs, beans, peas, nuts, seeds, and soy products. Limit drinks and foods with added sugar. These include candy, desserts, and soda pop. Lifestyle changes    If your doctor recommends it, get more exercise. Walking is a good choice. Bit by bit, increase the amount you walk every day. Try for at least 30 minutes on most days of the week. You also may want to swim, bike, or do other activities. Do not smoke. If you need help quitting, talk to your doctor about stop-smoking programs and medicines. These can increase your chances of quitting for good. Quitting smoking may be the most important step you can take to protect your heart. It is never too late to quit. Limit alcohol to 2 drinks a day for men and 1 drink a day for women. Too much alcohol can cause health problems. Manage other health problems such as diabetes, high blood pressure, and high cholesterol. If you think you may have a problem with alcohol or drug use, talk to your doctor. Medicines    Take your medicines exactly as prescribed. Call your doctor if you think you are having a problem with your medicine.      If your doctor recommends aspirin, take the amount directed each day. Make sure you take aspirin and not another kind of pain reliever, such as acetaminophen (Tylenol). When should you call for help? Call 911 if you have symptoms of a heart attack. These may include:    Chest pain or pressure, or a strange feeling in the chest.     Sweating. Shortness of breath. Pain, pressure, or a strange feeling in the back, neck, jaw, or upper belly or in one or both shoulders or arms. Lightheadedness or sudden weakness. A fast or irregular heartbeat. After you call 911, the  may tell you to chew 1 adult-strength or 2 to 4 low-dose aspirin. Wait for an ambulance. Do not try to drive yourself. Watch closely for changes in your health, and be sure to contact your doctor if you have any problems. Where can you learn more? Go to http://www.saavedra.com/  Enter F075 in the search box to learn more about \"A Healthy Heart: Care Instructions. \"  Current as of: January 10, 2022               Content Version: 13.4  © 2795-6096 [employer]. Care instructions adapted under license by [employer] (which disclaims liability or warranty for this information). If you have questions about a medical condition or this instruction, always ask your healthcare professional. [address] any warranty or liability for your use of this information.

## 2023-01-17 NOTE — PROGRESS NOTES
[facility]  [address]  [facility]  [address]  Phone: [phone]  Fax: [phone]        PROGRESS NOTE      HISTORY OF PRESENT ILLNESS:  The patient is a 79 y.o. male and was seen today for follow up of low back pain without radiculopathy. His pain is exacerbated by standing and walking, relieved with sitting. Patient reports Positive shopping cart sign. Patient reports his bilateral leg weakness has gotten worse in the past couple of months. Patient is unsure why the note says he can not take motrin. Patient denies recent fevers, weight loss, rashes or skin sores. No stomach ulcers or bleeding disorders. No history of spinal surgery or injection. Patient denies recent physical therapy or chiropractic care in the past. Pt denies change in bowel or bladder habits. PmHx of [place], HTN, DM. Madonna stanley NP dated 7/21/2022 back pain 40 years. Intermittent. DM. Not candidate for NSAIDs. Patient was given a injection. Note from Jack Cha MD dated 10/24/2022 bilateral leg weakness, HTN, DM. Both of his legs for 7-8 months and only feels it when he is walking or standing, but not when he is sitting. Lumbar spine XR dated 7/21/2022 films independently reviewed by me. Per report, diffuse degeneratice changes with extensive osterophytosis throughout the lumbar spine. bridging osteophytes at L1-2 and L2-3. noacute pathology identified. At his last clinical appointment, I ordered a L spine MRI. I tried him on Gabapentin 100 mg TID. The patient returns today with pain location and distribution remains unchanged. He rates his pain 0-10/10, previously 0/10. Patient stopped taking the Gabapentin 100 mg TID after about 2 weeks because he had no relief, but he was tolerating it. Pt denies change in bowel or bladder habits. Lumbar spine MRI dated 1/6/2023 films independently reviewed by me, but report is not available.  Per my report, disc protrusions L2-3, L3-4, and L4-5. Moderate spinal canal stenosis L4-5 with facet arthropathy. Severe L3-4 with facet arthropathy. Moderate to severe L2-3 with facet arthropathy.  reviewed. Gabapentin me Body mass index is 33.15 kg/m². PCP: Rodney Law MD      Past Medical History:   Diagnosis Date    Allergic sinusitis     BPH (benign prostatic hyperplasia)     Chronic diastolic heart failure ([facility] [address] 2014    Congestive heart failure, unspecified     Diabetes mellitus ([facility] [address] 3/29/2013    DJD (degenerative joint disease)     Erectile dysfunction     Essential hypertension     Hydrocele     Hyperlipidemia 2016    Mitral valve disorders(424.0) 2014 mild MR     Obesity, unspecified 11/10/2014    Syncope and collapse 2014 likely vagal, post 1st dose hytrin 5/15 no recurrence         Social History     Socioeconomic History    Marital status:      Spouse name: Not on file    Number of children: Not on file    Years of education: Not on file    Highest education level: Not on file   Occupational History    Not on file   Tobacco Use    Smoking status: Former     Types: Cigarettes     Quit date: 1974     Years since quittin.0    Smokeless tobacco: Never   Substance and Sexual Activity    Alcohol use: No    Drug use: No    Sexual activity: Not on file   Other Topics Concern    Not on file   Social History Narrative    Not on file     Social Determinants of Health     Financial Resource Strain: Not on file   Food Insecurity: Not on file   Transportation Needs: Not on file   Physical Activity: Not on file   Stress: Not on file   Social Connections: Not on file   Intimate Partner Violence: Not on file   Housing Stability: Not on file       Current Outpatient Medications   Medication Sig Dispense Refill    aspirin delayed-release 81 mg tablet Take  by mouth daily.       furosemide (LASIX) 40 mg tablet take 1 tablet by mouth once daily 90 Tablet 1    spironolactone (ALDACTONE) 50 mg tablet Take 50 mg by mouth in the morning. pravastatin (PRAVACHOL) 20 mg tablet take 1 tablet by mouth nightly 90 Tablet 2    finasteride (PROSCAR) 5 mg tablet take 1 tablet by mouth once daily (Patient taking differently: Take 5 mg by mouth daily. take 1 tablet by mouth once daily) 90 Tablet 4    silodosin (Rapaflo) 8 mg capsule take 1 capsule by mouth once daily with BREAKFAST (Patient taking differently: Take 8 mg by mouth daily (with breakfast). take 1 capsule by mouth once daily with BREAKFAST) 90 Capsule 4    candesartan-hydroCHLOROthiazide (ATACAND HCT) 32-25 mg tab per tablet Take 1 Tab by mouth daily. latanoprost (XALATAN) 0.005 % ophthalmic solution Administer 1 Drop to both eyes nightly. 0    metoprolol tartrate (LOPRESSOR) 50 mg tablet take 1 tablet by mouth twice a day 60 Tab 6    metFORMIN (GLUCOPHAGE) 500 mg tablet Take 500 mg by mouth daily (with breakfast). montelukast (SINGULAIR) 10 mg tablet Take 10 mg by mouth daily. Allergies   Allergen Reactions    Amlodipine Besylate Swelling    Atorvastatin Calcium Unknown (comments)    Tramadol Unknown (comments)          PHYSICAL EXAMINATION    Visit Vitals  Pulse 80   Temp 97.8 °F (36.6 °C)   Resp 17   Ht 5' 8\" (1.727 m)   Wt 218 lb (98.9 kg)   SpO2 98%   BMI 33.15 kg/m²       CONSTITUTIONAL: NAD, A&O x 3  SENSATION: Intact to light touch throughout  MOTOR:  Straight Leg Raise: Negative, bilateral    Ambulates without assistive device. Hip Flex Knee Ext Knee Flex Ankle DF GTE Ankle PF Tone   Right +4/5 +4/5 +4/5 +4/5 +4/5 +4/5 +4/5   Left +4/5 +4/5 +4/5 +4/5 +4/5 +4/5 +4/5       ASSESSMENT   Diagnoses and all orders for this visit:    1. Facet arthropathy    2. Spinal stenosis of lumbar region, unspecified whether neurogenic claudication present    3. HNP (herniated nucleus pulposus), lumbar        IMPRESSION AND PLAN:  Patient returns to the office today with c/o low back pain without radiculopathy.  Multiple treatment options were discussed. I recommended he restart the gabapentin 100 mg TID and then increase it to Gabapentin 300 mg TID in a month. Patient advised to call the office if intolerant to higher dose. I will order a epidural L4-5. Patient is neurologically intact. I will see the patient back after epidural or earlier if needed. Written by Dao Marrufo, as dictated by Daquan Potter MD  I examined the patient, reviewed and agree with the note.

## 2023-01-18 ENCOUNTER — OFFICE VISIT (OUTPATIENT)
Dept: ORTHOPEDIC SURGERY | Age: 83
End: 2023-01-18
Payer: MEDICARE

## 2023-01-18 VITALS
BODY MASS INDEX: 33.04 KG/M2 | TEMPERATURE: 97.8 F | OXYGEN SATURATION: 98 % | WEIGHT: 218 LBS | RESPIRATION RATE: 17 BRPM | HEART RATE: 80 BPM | HEIGHT: 68 IN

## 2023-01-18 DIAGNOSIS — M51.26 HNP (HERNIATED NUCLEUS PULPOSUS), LUMBAR: ICD-10-CM

## 2023-01-18 DIAGNOSIS — M48.061 SPINAL STENOSIS OF LUMBAR REGION, UNSPECIFIED WHETHER NEUROGENIC CLAUDICATION PRESENT: ICD-10-CM

## 2023-01-18 DIAGNOSIS — M47.819 FACET ARTHROPATHY: Primary | ICD-10-CM

## 2023-01-18 RX ORDER — GABAPENTIN 300 MG/1
300 CAPSULE ORAL 3 TIMES DAILY
Qty: 90 CAPSULE | Refills: 1 | Status: SHIPPED | OUTPATIENT
Start: 2023-02-18

## 2023-01-18 NOTE — LETTER
1/18/2023    Patient: Jose Frankel   YOB: 1940   Date of Visit: 1/18/2023     Marybeth Galindo MD  [address] [place] [address]  Via Fax: [phone]    Dear Marybeth Galindo MD,      Thank you for referring Mr. Antonia Moeller to [address] for evaluation. My notes for this consultation are attached. If you have questions, please do not hesitate to call me. I look forward to following your patient along with you.       Sincerely,    Katelyn Shabazz MD

## 2023-02-08 DIAGNOSIS — M47.816 LUMBAR SPONDYLOSIS: ICD-10-CM

## 2023-02-08 DIAGNOSIS — M51.36 DDD (DEGENERATIVE DISC DISEASE), LUMBAR: ICD-10-CM

## 2023-02-08 DIAGNOSIS — M54.50 LOW BACK PAIN, UNSPECIFIED BACK PAIN LATERALITY, UNSPECIFIED CHRONICITY, UNSPECIFIED WHETHER SCIATICA PRESENT: ICD-10-CM

## 2023-02-08 RX ORDER — GABAPENTIN 100 MG/1
CAPSULE ORAL
Qty: 90 CAPSULE | OUTPATIENT
Start: 2023-02-08

## 2023-02-09 DIAGNOSIS — I50.32 CHRONIC DIASTOLIC HEART FAILURE (HCC): Primary | ICD-10-CM

## 2023-03-29 ENCOUNTER — OFFICE VISIT (OUTPATIENT)
Age: 83
End: 2023-03-29
Payer: MEDICARE

## 2023-03-29 VITALS
TEMPERATURE: 98.5 F | BODY MASS INDEX: 33.6 KG/M2 | WEIGHT: 221 LBS | HEART RATE: 61 BPM | OXYGEN SATURATION: 97 % | RESPIRATION RATE: 20 BRPM

## 2023-03-29 DIAGNOSIS — M51.26 HNP (HERNIATED NUCLEUS PULPOSUS), LUMBAR: ICD-10-CM

## 2023-03-29 DIAGNOSIS — M48.061 SPINAL STENOSIS OF LUMBAR REGION WITHOUT NEUROGENIC CLAUDICATION: ICD-10-CM

## 2023-03-29 DIAGNOSIS — M47.819 FACET ARTHROPATHY: Primary | ICD-10-CM

## 2023-03-29 PROCEDURE — G8417 CALC BMI ABV UP PARAM F/U: HCPCS | Performed by: PHYSICAL MEDICINE & REHABILITATION

## 2023-03-29 PROCEDURE — 1036F TOBACCO NON-USER: CPT | Performed by: PHYSICAL MEDICINE & REHABILITATION

## 2023-03-29 PROCEDURE — G8484 FLU IMMUNIZE NO ADMIN: HCPCS | Performed by: PHYSICAL MEDICINE & REHABILITATION

## 2023-03-29 PROCEDURE — G8427 DOCREV CUR MEDS BY ELIG CLIN: HCPCS | Performed by: PHYSICAL MEDICINE & REHABILITATION

## 2023-03-29 PROCEDURE — 99214 OFFICE O/P EST MOD 30 MIN: CPT | Performed by: PHYSICAL MEDICINE & REHABILITATION

## 2023-03-29 PROCEDURE — 1123F ACP DISCUSS/DSCN MKR DOCD: CPT | Performed by: PHYSICAL MEDICINE & REHABILITATION

## 2023-03-29 RX ORDER — PREGABALIN 50 MG/1
50 CAPSULE ORAL 2 TIMES DAILY
Qty: 60 CAPSULE | Refills: 1 | Status: SHIPPED | OUTPATIENT
Start: 2023-03-29 | End: 2023-05-28

## 2023-03-29 RX ORDER — VALSARTAN AND HYDROCHLOROTHIAZIDE 320; 25 MG/1; MG/1
1 TABLET, FILM COATED ORAL
COMMUNITY

## 2023-03-29 RX ORDER — TRAMADOL HYDROCHLORIDE 50 MG/1
50 TABLET ORAL EVERY 6 HOURS PRN
COMMUNITY

## 2023-03-29 RX ORDER — AMLODIPINE BESYLATE 10 MG/1
10 TABLET ORAL DAILY
COMMUNITY

## 2023-03-29 NOTE — PROGRESS NOTES
tablet by mouth      silodosin (RAPAFLO) 8 MG CAPS Take 1 capsule by mouth daily 90 capsule 3    aspirin 81 MG EC tablet Take by mouth daily      candesartan cilexetil-HCTZ (ATACAND HCT) 32-25 MG TABS Take 1 tablet by mouth daily      finasteride (PROSCAR) 5 MG tablet take 1 tablet by mouth once daily      furosemide (LASIX) 40 MG tablet take 1 tablet by mouth once daily      gabapentin (NEURONTIN) 100 MG capsule Take 300 mg by mouth 3 times daily. latanoprost (XALATAN) 0.005 % ophthalmic solution Apply 1 drop to eye      metFORMIN (GLUCOPHAGE) 500 MG tablet Take 500 mg by mouth daily (with breakfast)      metoprolol tartrate (LOPRESSOR) 50 MG tablet take 1 tablet by mouth twice a day      montelukast (SINGULAIR) 10 MG tablet Take 10 mg by mouth daily      pravastatin (PRAVACHOL) 20 MG tablet take 1 tablet by mouth nightly      spironolactone (ALDACTONE) 50 MG tablet Take 50 mg by mouth daily      amLODIPine (NORVASC) 10 MG tablet Take 10 mg by mouth daily       No current facility-administered medications for this visit. Allergies   Allergen Reactions    Amlodipine Besylate Swelling    Atorvastatin      Other reaction(s): Unknown (comments)    Tramadol      Other reaction(s): Unknown (comments)          PHYSICAL EXAMINATION    Pulse 61   Temp 98.5 °F (36.9 °C)   Resp 20   Wt 221 lb (100.2 kg)   SpO2 97%   BMI 33.60 kg/m²     CONSTITUTIONAL: NAD, A&O x 3  SENSATION:  Sensation is intact to light touch throughout. MOTOR:  Straight Leg Raise: Negative, bilateral    Ambulates with a wide base quad point cane. 1+ edema distal BLE. Hip Flex Knee Ext Knee Flex Ankle DF GTE Ankle PF Tone   Right +4/5 +4/5 +4/5 +4/5 +4/5 +4/5 +4/5   Left +4/5 +4/5 +4/5 +4/5 +4/5 +4/5 +4/5       ASSESSMENT   [employer] was seen today for back problem.     Diagnoses and all orders for this visit:    Facet arthropathy    Spinal stenosis of lumbar region without neurogenic claudication    HNP (herniated nucleus pulposus),

## 2023-04-26 ENCOUNTER — OFFICE VISIT (OUTPATIENT)
Age: 83
End: 2023-04-26
Payer: MEDICARE

## 2023-04-26 VITALS
BODY MASS INDEX: 31.22 KG/M2 | WEIGHT: 223 LBS | HEIGHT: 71 IN | TEMPERATURE: 97.8 F | HEART RATE: 67 BPM | OXYGEN SATURATION: 97 %

## 2023-04-26 DIAGNOSIS — M51.26 HNP (HERNIATED NUCLEUS PULPOSUS), LUMBAR: ICD-10-CM

## 2023-04-26 DIAGNOSIS — M54.16 LUMBAR NEURITIS: ICD-10-CM

## 2023-04-26 DIAGNOSIS — R29.898 RIGHT LEG WEAKNESS: Primary | ICD-10-CM

## 2023-04-26 DIAGNOSIS — M48.061 SPINAL STENOSIS OF LUMBAR REGION WITHOUT NEUROGENIC CLAUDICATION: ICD-10-CM

## 2023-04-26 DIAGNOSIS — M47.817 LUMBOSACRAL SPONDYLOSIS WITHOUT MYELOPATHY: ICD-10-CM

## 2023-04-26 PROCEDURE — G8428 CUR MEDS NOT DOCUMENT: HCPCS | Performed by: PHYSICAL MEDICINE & REHABILITATION

## 2023-04-26 PROCEDURE — 1036F TOBACCO NON-USER: CPT | Performed by: PHYSICAL MEDICINE & REHABILITATION

## 2023-04-26 PROCEDURE — 1123F ACP DISCUSS/DSCN MKR DOCD: CPT | Performed by: PHYSICAL MEDICINE & REHABILITATION

## 2023-04-26 PROCEDURE — 99213 OFFICE O/P EST LOW 20 MIN: CPT | Performed by: PHYSICAL MEDICINE & REHABILITATION

## 2023-04-26 PROCEDURE — G8417 CALC BMI ABV UP PARAM F/U: HCPCS | Performed by: PHYSICAL MEDICINE & REHABILITATION

## 2023-04-26 RX ORDER — CANDESARTAN 32 MG/1
TABLET ORAL
COMMUNITY

## 2023-04-26 RX ORDER — PYRAZINAMIDE 500 MG/1
TABLET ORAL
COMMUNITY

## 2023-04-26 RX ORDER — ATORVASTATIN CALCIUM 10 MG/1
TABLET, FILM COATED ORAL
COMMUNITY

## 2023-04-26 RX ORDER — GABAPENTIN 300 MG/1
300 CAPSULE ORAL 3 TIMES DAILY
COMMUNITY
Start: 2023-03-24 | End: 2023-04-26 | Stop reason: ALTCHOICE

## 2023-04-26 RX ORDER — FLUTICASONE PROPIONATE 50 MCG
SPRAY, SUSPENSION (ML) NASAL
COMMUNITY

## 2023-04-26 NOTE — PROGRESS NOTES
[facility]  [address], [place]  Phone: [phone]  Fax: [phone]        PROGRESS NOTE      HISTORY OF PRESENT ILLNESS:  The patient is a 79 y.o. male and was seen today for follow up of low back pain with distal BLE pain. Previously seen for low back pain without radiculopathy. His pain is exacerbated by standing and walking, relieved with sitting. Patient reports  Positive shopping cart sign. Patient reports his bilateral leg weakness has gotten worse in the past couple of months. Patient is unsure why the note says he can not take motrin. Patient denies recent  fevers, weight loss, rashes or skin sores. No stomach ulcers or bleeding disorders. No history of spinal surgery. Pt underwent L4-5 epidural on 2/14/2023 with relief, 100% better. Patient denies recent physical therapy or chiropractic care in the past. Pt denies change in bowel or bladder habits. Patient notes distal BLE edema since taking the Gabapentin 300 mg TID. Patient is taking Aspirin. PmHx  of [place], HTN, DM. Jeni navas NP dated 7/21/2022 back pain 40 years. Intermittent. DM. Not candidate for NSAIDs. Patient was given a injection. Note from Leslie Fernandez MD dated 10/24/2022 bilateral leg weakness, HTN, DM. Both of his legs for 7-8 months and only feels it when he is walking or standing, but not when he is sitting. Lumbar  spine XR dated 7/21/2022 films independently reviewed by me. Per report, diffuse degeneratice changes with extensive osterophytosis throughout the lumbar spine. bridging osteophytes at L1-2 and L2-3.  noacute pathology identified. Lumbar spine MRI dated 1/6/2023 films independently reviewed by me, but report is not available. Per my report,  disc protrusions L2-3, L3-4, and L4-5. Moderate spinal canal stenosis L4-5 with facet arthropathy. Severe L3-4 with facet arthropathy. Moderate to severe L2-3 with facet arthropathy.  At his last clinical appointment,  I

## 2023-04-27 DIAGNOSIS — M54.16 LUMBAR NEURITIS: ICD-10-CM

## 2023-05-19 ENCOUNTER — OFFICE VISIT (OUTPATIENT)
Age: 83
End: 2023-05-19
Payer: MEDICARE

## 2023-05-19 VITALS
RESPIRATION RATE: 18 BRPM | BODY MASS INDEX: 30.57 KG/M2 | OXYGEN SATURATION: 98 % | HEIGHT: 71 IN | WEIGHT: 218.4 LBS | HEART RATE: 62 BPM | TEMPERATURE: 97.8 F

## 2023-05-19 DIAGNOSIS — M51.36 DDD (DEGENERATIVE DISC DISEASE), LUMBAR: ICD-10-CM

## 2023-05-19 DIAGNOSIS — R29.898 RIGHT LEG WEAKNESS: Primary | ICD-10-CM

## 2023-05-19 DIAGNOSIS — M48.062 SPINAL STENOSIS OF LUMBAR REGION WITH NEUROGENIC CLAUDICATION: ICD-10-CM

## 2023-05-19 DIAGNOSIS — G62.9 PERIPHERAL POLYNEUROPATHY: ICD-10-CM

## 2023-05-19 DIAGNOSIS — M47.817 LUMBOSACRAL SPONDYLOSIS WITHOUT MYELOPATHY: ICD-10-CM

## 2023-05-19 DIAGNOSIS — M54.16 LUMBAR NEURITIS: ICD-10-CM

## 2023-05-19 DIAGNOSIS — M51.26 HNP (HERNIATED NUCLEUS PULPOSUS), LUMBAR: ICD-10-CM

## 2023-05-19 PROCEDURE — 99214 OFFICE O/P EST MOD 30 MIN: CPT | Performed by: PHYSICAL MEDICINE & REHABILITATION

## 2023-05-19 PROCEDURE — 1036F TOBACCO NON-USER: CPT | Performed by: PHYSICAL MEDICINE & REHABILITATION

## 2023-05-19 PROCEDURE — G8417 CALC BMI ABV UP PARAM F/U: HCPCS | Performed by: PHYSICAL MEDICINE & REHABILITATION

## 2023-05-19 PROCEDURE — 1123F ACP DISCUSS/DSCN MKR DOCD: CPT | Performed by: PHYSICAL MEDICINE & REHABILITATION

## 2023-05-19 PROCEDURE — G8427 DOCREV CUR MEDS BY ELIG CLIN: HCPCS | Performed by: PHYSICAL MEDICINE & REHABILITATION

## 2023-05-19 RX ORDER — DULOXETIN HYDROCHLORIDE 20 MG/1
20 CAPSULE, DELAYED RELEASE ORAL
Qty: 30 CAPSULE | Refills: 1 | Status: SHIPPED | OUTPATIENT
Start: 2023-05-19

## 2023-05-19 NOTE — PROGRESS NOTES
[facility]  [address], [place] [address]  Phone: [phone]  Fax: [phone]        PROGRESS NOTE      HISTORY OF PRESENT ILLNESS:  The patient is a 79 y.o. male and was seen today for follow up of low back pain with distal BLE pain. Previously seen for low back pain without radiculopathy. His pain is exacerbated by standing and walking, relieved with sitting. Patient reports  Positive shopping cart sign. Patient reports his bilateral leg weakness has gotten worse in the past couple of months. Patient is unsure why the note says he can not take motrin. Patient denies recent  fevers, weight loss, rashes or skin sores. No stomach ulcers or bleeding disorders. No history of spinal surgery. Pt underwent L4-5 epidural on 2/14/2023 with relief, 100% better. Patient denies recent physical therapy or chiropractic care in the past. Pt denies change in bowel or bladder habits. Patient notes distal BLE edema since taking the Gabapentin 300 mg TID. Patient failed Lyrica 50 mg BID due to Patient is taking Aspirin. PmHx  of [place], HTN, DM (A1C of 6.4 on 3/4/2022). Jeni navas NP dated 7/21/2022 back pain 40 years. Intermittent. DM. Not candidate for NSAIDs. Patient was given a injection. Note from Lincoln Rodriguez MD dated 10/24/2022 bilateral leg weakness, HTN, DM. Both of his legs for 7-8 months and only feels it when he is walking or standing, but not when he is sitting. Lumbar  spine XR dated 7/21/2022 films independently reviewed by me. Per report, diffuse degeneratice changes with extensive osterophytosis throughout the lumbar spine. bridging osteophytes at L1-2 and L2-3.  noacute pathology identified. Lumbar spine MRI dated 1/6/2023 films independently reviewed by me, but report is not available. Per my report,  disc protrusions L2-3, L3-4, and L4-5. Moderate spinal canal stenosis L4-5 with facet arthropathy. Severe L3-4 with facet arthropathy.  Moderate to severe L2-3

## 2023-05-23 DIAGNOSIS — M47.817 LUMBOSACRAL SPONDYLOSIS WITHOUT MYELOPATHY: ICD-10-CM

## 2023-05-23 DIAGNOSIS — R29.898 RIGHT LEG WEAKNESS: ICD-10-CM

## 2023-05-23 DIAGNOSIS — M48.062 SPINAL STENOSIS OF LUMBAR REGION WITH NEUROGENIC CLAUDICATION: ICD-10-CM

## 2023-05-23 DIAGNOSIS — M51.26 HNP (HERNIATED NUCLEUS PULPOSUS), LUMBAR: ICD-10-CM

## 2023-05-23 DIAGNOSIS — M54.16 LUMBAR NEURITIS: ICD-10-CM

## 2023-05-23 DIAGNOSIS — M51.36 DDD (DEGENERATIVE DISC DISEASE), LUMBAR: ICD-10-CM

## 2023-06-05 ENCOUNTER — TELEPHONE (OUTPATIENT)
Age: 83
End: 2023-06-05

## 2023-06-05 DIAGNOSIS — I50.32 CHRONIC DIASTOLIC (CONGESTIVE) HEART FAILURE (HCC): Primary | ICD-10-CM

## 2023-06-05 NOTE — TELEPHONE ENCOUNTER
Spoke to patient wife per Dr. Wolf Messing regarding lab/test. Lab reviewed. RPT BMP in 3 months. He voices understanding and acceptance of this advice and will call back if any further questions or concerns.

## 2023-06-06 DIAGNOSIS — I50.32 CHRONIC DIASTOLIC HEART FAILURE (HCC): ICD-10-CM

## 2023-06-09 ENCOUNTER — TELEPHONE (OUTPATIENT)
Age: 83
End: 2023-06-09

## 2023-06-09 ENCOUNTER — OFFICE VISIT (OUTPATIENT)
Age: 83
End: 2023-06-09
Payer: MEDICARE

## 2023-06-09 VITALS
OXYGEN SATURATION: 98 % | WEIGHT: 212 LBS | SYSTOLIC BLOOD PRESSURE: 145 MMHG | HEIGHT: 68 IN | BODY MASS INDEX: 32.13 KG/M2 | HEART RATE: 66 BPM | DIASTOLIC BLOOD PRESSURE: 70 MMHG

## 2023-06-09 DIAGNOSIS — E78.00 PURE HYPERCHOLESTEROLEMIA: ICD-10-CM

## 2023-06-09 DIAGNOSIS — I71.21 ANEURYSM OF THE ASCENDING AORTA, WITHOUT RUPTURE (HCC): ICD-10-CM

## 2023-06-09 DIAGNOSIS — I50.32 CHRONIC DIASTOLIC (CONGESTIVE) HEART FAILURE (HCC): Primary | ICD-10-CM

## 2023-06-09 DIAGNOSIS — I10 ESSENTIAL (PRIMARY) HYPERTENSION: ICD-10-CM

## 2023-06-09 DIAGNOSIS — I34.0 NONRHEUMATIC MITRAL (VALVE) INSUFFICIENCY: ICD-10-CM

## 2023-06-09 DIAGNOSIS — E66.9 OBESITY (BMI 30.0-34.9): ICD-10-CM

## 2023-06-09 PROCEDURE — 1123F ACP DISCUSS/DSCN MKR DOCD: CPT | Performed by: INTERNAL MEDICINE

## 2023-06-09 PROCEDURE — 99214 OFFICE O/P EST MOD 30 MIN: CPT | Performed by: INTERNAL MEDICINE

## 2023-06-09 PROCEDURE — 3077F SYST BP >= 140 MM HG: CPT | Performed by: INTERNAL MEDICINE

## 2023-06-09 PROCEDURE — 3078F DIAST BP <80 MM HG: CPT | Performed by: INTERNAL MEDICINE

## 2023-06-09 PROCEDURE — G8427 DOCREV CUR MEDS BY ELIG CLIN: HCPCS | Performed by: INTERNAL MEDICINE

## 2023-06-09 PROCEDURE — G8417 CALC BMI ABV UP PARAM F/U: HCPCS | Performed by: INTERNAL MEDICINE

## 2023-06-09 PROCEDURE — 1036F TOBACCO NON-USER: CPT | Performed by: INTERNAL MEDICINE

## 2023-06-09 ASSESSMENT — PATIENT HEALTH QUESTIONNAIRE - PHQ9
SUM OF ALL RESPONSES TO PHQ QUESTIONS 1-9: 0
DEPRESSION UNABLE TO ASSESS: FUNCTIONAL CAPACITY MOTIVATION LIMITS ACCURACY
1. LITTLE INTEREST OR PLEASURE IN DOING THINGS: 0
2. FEELING DOWN, DEPRESSED OR HOPELESS: 0
SUM OF ALL RESPONSES TO PHQ QUESTIONS 1-9: 0
SUM OF ALL RESPONSES TO PHQ9 QUESTIONS 1 & 2: 0
SUM OF ALL RESPONSES TO PHQ QUESTIONS 1-9: 0
SUM OF ALL RESPONSES TO PHQ QUESTIONS 1-9: 0

## 2023-06-09 ASSESSMENT — ENCOUNTER SYMPTOMS
EYES NEGATIVE: 1
SHORTNESS OF BREATH: 1
GASTROINTESTINAL NEGATIVE: 1

## 2023-06-09 NOTE — PROGRESS NOTES
1. Have you been to the ER, urgent care clinic since your last visit? Hospitalized since your last visit? No      2. Where do you normally have your labs drawn? PCP    3. Do you need any refills today? No    4. Which local pharmacy do you use (enter pharmacy)? [facility]    5. Which mail order pharmacy do you use (enter pharmacy)? No     6. Are you here for surgical clearance and if so who will be doing your     procedure/surgery (care team)?    No

## 2023-06-09 NOTE — TELEPHONE ENCOUNTER
Patient came into the [facility] location and wanted to let Salma De Santiago know that he is going to hold off on having the MRI until after he has his cardiology appts

## 2023-06-09 NOTE — PROGRESS NOTES
Camelia Merritt is a 79y.o. year old male. Follow-up of CHF, hypertension, hyperlipidemia, MR       5/16 restarted diovan and d/triston lisinopril  7/2019 - Stopped taking Atorvastatin due to myalgias  7/21 does not take amlodipine as it makes his head feel funny. Pravastatin causing cramps in the calves. Syncope 3-4 hrs post 1st dose of hytrin in 1/14, gradual fall with mild sweating, urinary incontinence, no vomiting  Similar episode in 2005 approx  No recurrence since not taking hytrin in 1/14 1/22 tolerating amlodipine well now. Says that BP better at home. 6/9/2023 Feels better. BP is better at home in 120s. Feels tired quickly when he tries to walk to mailbox and back. Edema improved since amlodipine discontinued. Review of Systems   Constitutional:  Positive for fatigue. HENT: Negative. Eyes: Negative. Respiratory:  Positive for shortness of breath. Cardiovascular: Negative. Gastrointestinal: Negative. Endocrine: Negative. Genitourinary: Negative. Musculoskeletal: Negative. Neurological: Negative. Psychiatric/Behavioral: Negative. All other systems reviewed and are negative. Physical Exam  Vitals and nursing note reviewed. Constitutional:       Appearance: Normal appearance. HENT:      Head: Normocephalic and atraumatic. Nose: Nose normal.   Eyes:      Conjunctiva/sclera: Conjunctivae normal.   Cardiovascular:      Rate and Rhythm: Normal rate and regular rhythm. Pulses: Normal pulses. Heart sounds: Normal heart sounds. Pulmonary:      Effort: Pulmonary effort is normal.      Breath sounds: Normal breath sounds. Abdominal:      General: Bowel sounds are normal.      Palpations: Abdomen is soft. Musculoskeletal:         General: Normal range of motion. Right lower leg: No edema. Left lower leg: No edema. Skin:     General: Skin is warm and dry. Neurological:      General: No focal deficit present.       Mental Status:

## 2023-12-19 PROBLEM — I77.810 AORTIC ECTASIA, THORACIC (HCC): Status: ACTIVE | Noted: 2023-12-19

## 2023-12-19 PROBLEM — I35.1 NONRHEUMATIC AORTIC VALVE INSUFFICIENCY: Status: ACTIVE | Noted: 2023-12-19

## 2024-04-08 ENCOUNTER — OFFICE VISIT (OUTPATIENT)
Age: 84
End: 2024-04-08
Payer: MEDICARE

## 2024-04-08 VITALS
SYSTOLIC BLOOD PRESSURE: 170 MMHG | WEIGHT: 218 LBS | BODY MASS INDEX: 30.52 KG/M2 | HEIGHT: 71 IN | HEART RATE: 35 BPM | DIASTOLIC BLOOD PRESSURE: 74 MMHG | OXYGEN SATURATION: 97 %

## 2024-04-08 DIAGNOSIS — I77.810 AORTIC ECTASIA, THORACIC (HCC): ICD-10-CM

## 2024-04-08 DIAGNOSIS — I35.1 NONRHEUMATIC AORTIC VALVE INSUFFICIENCY: ICD-10-CM

## 2024-04-08 DIAGNOSIS — I10 ESSENTIAL (PRIMARY) HYPERTENSION: ICD-10-CM

## 2024-04-08 DIAGNOSIS — I50.33 ACUTE ON CHRONIC DIASTOLIC CONGESTIVE HEART FAILURE (HCC): Primary | ICD-10-CM

## 2024-04-08 PROCEDURE — 1123F ACP DISCUSS/DSCN MKR DOCD: CPT | Performed by: INTERNAL MEDICINE

## 2024-04-08 PROCEDURE — 1036F TOBACCO NON-USER: CPT | Performed by: INTERNAL MEDICINE

## 2024-04-08 PROCEDURE — G8417 CALC BMI ABV UP PARAM F/U: HCPCS | Performed by: INTERNAL MEDICINE

## 2024-04-08 PROCEDURE — G8427 DOCREV CUR MEDS BY ELIG CLIN: HCPCS | Performed by: INTERNAL MEDICINE

## 2024-04-08 PROCEDURE — 3077F SYST BP >= 140 MM HG: CPT | Performed by: INTERNAL MEDICINE

## 2024-04-08 PROCEDURE — 3078F DIAST BP <80 MM HG: CPT | Performed by: INTERNAL MEDICINE

## 2024-04-08 PROCEDURE — 99214 OFFICE O/P EST MOD 30 MIN: CPT | Performed by: INTERNAL MEDICINE

## 2024-04-08 RX ORDER — FUROSEMIDE 40 MG/1
40 TABLET ORAL DAILY
Qty: 90 TABLET | Refills: 1 | Status: SHIPPED | OUTPATIENT
Start: 2024-04-08

## 2024-04-08 ASSESSMENT — ENCOUNTER SYMPTOMS
SHORTNESS OF BREATH: 1
EYES NEGATIVE: 1
GASTROINTESTINAL NEGATIVE: 1

## 2024-04-08 ASSESSMENT — PATIENT HEALTH QUESTIONNAIRE - PHQ9
SUM OF ALL RESPONSES TO PHQ QUESTIONS 1-9: 0
SUM OF ALL RESPONSES TO PHQ9 QUESTIONS 1 & 2: 0
SUM OF ALL RESPONSES TO PHQ QUESTIONS 1-9: 0
SUM OF ALL RESPONSES TO PHQ QUESTIONS 1-9: 0
1. LITTLE INTEREST OR PLEASURE IN DOING THINGS: NOT AT ALL
SUM OF ALL RESPONSES TO PHQ QUESTIONS 1-9: 0
2. FEELING DOWN, DEPRESSED OR HOPELESS: NOT AT ALL

## 2024-04-08 NOTE — PROGRESS NOTES
Mick Henderson is a 83 y.o. year old male.    Follow-up of CHF, hypertension, hyperlipidemia, MR       5/16 restarted diovan and d/triston lisinopril  7/2019 - Stopped taking Atorvastatin due to myalgias  7/21 does not take amlodipine as it makes his head feel funny.  Pravastatin causing cramps in the calves.  Syncope 3-4 hrs post 1st dose of hytrin in 1/14, gradual fall with mild sweating, urinary incontinence, no vomiting  Similar episode in 2005 approx  No recurrence since not taking hytrin in 1/14 1/22 tolerating amlodipine well now.  Says that BP better at home.  6/9/2023 Feels better.  BP is better at home in 120s.  Feels tired quickly when he tries to walk to mailbox and back.  Edema improved since amlodipine discontinued.  12/19/2023 pravastatin held by PCP due to weakness in the legs but it it has not improved any weakness since he stopped.  Not much ambulatory due to weakness in the legs..  No pain in the legs.  No chest pain or shortness of breath.  4/8/2024 has been having more dyspnea on exertion for last 2 weeks.  Taking Lasix 40 mg 3 days a week for quite some time now.  Noted edema in the legs.  Left leg is always edematous right leg had become edematous and may be trending down in the last couple of days.  Chest feels more tight also in the last 3 to 4 days but no definite pains and no relation to exertion.  No dizziness or palpitations.          Review of Systems   Constitutional:  Positive for fatigue.   HENT: Negative.     Eyes: Negative.    Respiratory:  Positive for shortness of breath.    Cardiovascular:  Positive for chest pain and leg swelling.   Gastrointestinal: Negative.    Endocrine: Negative.    Genitourinary: Negative.    Musculoskeletal: Negative.    Neurological: Negative.    Psychiatric/Behavioral: Negative.     All other systems reviewed and are negative.        Physical Exam  Vitals and nursing note reviewed.   Constitutional:       Appearance: Normal appearance.   HENT:      Head:

## 2024-04-09 ENCOUNTER — TELEPHONE (OUTPATIENT)
Age: 84
End: 2024-04-09